# Patient Record
Sex: FEMALE | Race: BLACK OR AFRICAN AMERICAN | Employment: OTHER | URBAN - METROPOLITAN AREA
[De-identification: names, ages, dates, MRNs, and addresses within clinical notes are randomized per-mention and may not be internally consistent; named-entity substitution may affect disease eponyms.]

---

## 2017-01-31 ENCOUNTER — OFFICE VISIT (OUTPATIENT)
Dept: FAMILY MEDICINE CLINIC | Age: 62
End: 2017-01-31

## 2017-01-31 VITALS
WEIGHT: 263.2 LBS | HEIGHT: 64 IN | HEART RATE: 84 BPM | DIASTOLIC BLOOD PRESSURE: 74 MMHG | BODY MASS INDEX: 44.94 KG/M2 | SYSTOLIC BLOOD PRESSURE: 128 MMHG | RESPIRATION RATE: 18 BRPM

## 2017-01-31 DIAGNOSIS — I10 ESSENTIAL HYPERTENSION: Primary | ICD-10-CM

## 2017-01-31 DIAGNOSIS — K76.0 FATTY LIVER DISEASE, NONALCOHOLIC: ICD-10-CM

## 2017-01-31 PROCEDURE — 99213 OFFICE O/P EST LOW 20 MIN: CPT | Performed by: INTERNAL MEDICINE

## 2017-01-31 ASSESSMENT — ENCOUNTER SYMPTOMS
ALLERGIC/IMMUNOLOGIC NEGATIVE: 1
RESPIRATORY NEGATIVE: 1

## 2017-05-08 ENCOUNTER — OFFICE VISIT (OUTPATIENT)
Dept: FAMILY MEDICINE CLINIC | Age: 62
End: 2017-05-08

## 2017-05-08 VITALS
BODY MASS INDEX: 45.75 KG/M2 | DIASTOLIC BLOOD PRESSURE: 64 MMHG | HEIGHT: 63 IN | WEIGHT: 258.2 LBS | HEART RATE: 96 BPM | SYSTOLIC BLOOD PRESSURE: 108 MMHG | RESPIRATION RATE: 20 BRPM

## 2017-05-08 DIAGNOSIS — I10 ESSENTIAL HYPERTENSION: Primary | ICD-10-CM

## 2017-05-08 DIAGNOSIS — E66.01 OBESITY, CLASS III, BMI 40-49.9 (MORBID OBESITY) (HCC): ICD-10-CM

## 2017-05-08 DIAGNOSIS — K76.0 FATTY LIVER DISEASE, NONALCOHOLIC: ICD-10-CM

## 2017-05-08 PROCEDURE — 99214 OFFICE O/P EST MOD 30 MIN: CPT | Performed by: INTERNAL MEDICINE

## 2017-05-08 ASSESSMENT — ENCOUNTER SYMPTOMS
APNEA: 0
COUGH: 1
CHEST TIGHTNESS: 0
WHEEZING: 0
CHOKING: 0
STRIDOR: 0
ALLERGIC/IMMUNOLOGIC NEGATIVE: 1
SHORTNESS OF BREATH: 0

## 2017-05-08 ASSESSMENT — PATIENT HEALTH QUESTIONNAIRE - PHQ9
SUM OF ALL RESPONSES TO PHQ QUESTIONS 1-9: 0
1. LITTLE INTEREST OR PLEASURE IN DOING THINGS: 0
2. FEELING DOWN, DEPRESSED OR HOPELESS: 0
SUM OF ALL RESPONSES TO PHQ9 QUESTIONS 1 & 2: 0

## 2017-05-09 RX ORDER — BENZONATATE 200 MG/1
200 CAPSULE ORAL 3 TIMES DAILY PRN
Qty: 30 CAPSULE | Refills: 0 | Status: SHIPPED | OUTPATIENT
Start: 2017-05-09 | End: 2018-01-26 | Stop reason: ALTCHOICE

## 2017-08-18 ENCOUNTER — TELEPHONE (OUTPATIENT)
Dept: FAMILY MEDICINE CLINIC | Age: 62
End: 2017-08-18

## 2018-01-26 ENCOUNTER — OFFICE VISIT (OUTPATIENT)
Dept: FAMILY MEDICINE CLINIC | Age: 63
End: 2018-01-26

## 2018-01-26 VITALS
RESPIRATION RATE: 20 BRPM | DIASTOLIC BLOOD PRESSURE: 92 MMHG | BODY MASS INDEX: 51.91 KG/M2 | WEIGHT: 293 LBS | SYSTOLIC BLOOD PRESSURE: 164 MMHG | HEIGHT: 63 IN | HEART RATE: 91 BPM | OXYGEN SATURATION: 96 %

## 2018-01-26 DIAGNOSIS — G89.29 CHRONIC PAIN OF LEFT KNEE: ICD-10-CM

## 2018-01-26 DIAGNOSIS — R53.83 FATIGUE, UNSPECIFIED TYPE: ICD-10-CM

## 2018-01-26 DIAGNOSIS — I10 ESSENTIAL HYPERTENSION: Primary | ICD-10-CM

## 2018-01-26 DIAGNOSIS — K76.0 FATTY LIVER DISEASE, NONALCOHOLIC: ICD-10-CM

## 2018-01-26 DIAGNOSIS — M25.562 CHRONIC PAIN OF LEFT KNEE: ICD-10-CM

## 2018-01-26 DIAGNOSIS — E78.2 MIXED HYPERLIPIDEMIA: ICD-10-CM

## 2018-01-26 LAB
ALBUMIN SERPL-MCNC: 3.8 G/DL (ref 3.4–5)
ALP BLD-CCNC: 81 U/L (ref 40–129)
ALT SERPL-CCNC: 35 U/L (ref 10–40)
ANION GAP SERPL CALCULATED.3IONS-SCNC: 14 MMOL/L (ref 3–16)
AST SERPL-CCNC: 51 U/L (ref 15–37)
BASOPHILS ABSOLUTE: 0 K/UL (ref 0–0.2)
BASOPHILS RELATIVE PERCENT: 0.4 %
BILIRUB SERPL-MCNC: 0.5 MG/DL (ref 0–1)
BILIRUBIN DIRECT: <0.2 MG/DL (ref 0–0.3)
BILIRUBIN, INDIRECT: ABNORMAL MG/DL (ref 0–1)
BUN BLDV-MCNC: 13 MG/DL (ref 7–20)
CALCIUM SERPL-MCNC: 9.1 MG/DL (ref 8.3–10.6)
CHLORIDE BLD-SCNC: 103 MMOL/L (ref 99–110)
CHOLESTEROL, TOTAL: 133 MG/DL (ref 0–199)
CO2: 29 MMOL/L (ref 21–32)
CREAT SERPL-MCNC: 0.7 MG/DL (ref 0.6–1.2)
EOSINOPHILS ABSOLUTE: 0.2 K/UL (ref 0–0.6)
EOSINOPHILS RELATIVE PERCENT: 2.8 %
GFR AFRICAN AMERICAN: >60
GFR NON-AFRICAN AMERICAN: >60
GLUCOSE BLD-MCNC: 108 MG/DL (ref 70–99)
HCT VFR BLD CALC: 42.2 % (ref 36–48)
HDLC SERPL-MCNC: 43 MG/DL (ref 40–60)
HEMOGLOBIN: 13.9 G/DL (ref 12–16)
LDL CHOLESTEROL CALCULATED: 74 MG/DL
LYMPHOCYTES ABSOLUTE: 1.3 K/UL (ref 1–5.1)
LYMPHOCYTES RELATIVE PERCENT: 17.3 %
MCH RBC QN AUTO: 31.9 PG (ref 26–34)
MCHC RBC AUTO-ENTMCNC: 32.9 G/DL (ref 31–36)
MCV RBC AUTO: 97 FL (ref 80–100)
MONOCYTES ABSOLUTE: 0.6 K/UL (ref 0–1.3)
MONOCYTES RELATIVE PERCENT: 7.6 %
NEUTROPHILS ABSOLUTE: 5.3 K/UL (ref 1.7–7.7)
NEUTROPHILS RELATIVE PERCENT: 71.9 %
PDW BLD-RTO: 14.7 % (ref 12.4–15.4)
PLATELET # BLD: 222 K/UL (ref 135–450)
PMV BLD AUTO: 8.1 FL (ref 5–10.5)
POTASSIUM SERPL-SCNC: 4 MMOL/L (ref 3.5–5.1)
RBC # BLD: 4.35 M/UL (ref 4–5.2)
SODIUM BLD-SCNC: 146 MMOL/L (ref 136–145)
T4 FREE: 1 NG/DL (ref 0.9–1.8)
TOTAL PROTEIN: 6.8 G/DL (ref 6.4–8.2)
TRIGL SERPL-MCNC: 81 MG/DL (ref 0–150)
TSH SERPL DL<=0.05 MIU/L-ACNC: 2.03 UIU/ML (ref 0.27–4.2)
VLDLC SERPL CALC-MCNC: 16 MG/DL
WBC # BLD: 7.4 K/UL (ref 4–11)

## 2018-01-26 PROCEDURE — 99214 OFFICE O/P EST MOD 30 MIN: CPT | Performed by: INTERNAL MEDICINE

## 2018-01-26 PROCEDURE — G8484 FLU IMMUNIZE NO ADMIN: HCPCS | Performed by: INTERNAL MEDICINE

## 2018-01-26 PROCEDURE — 36415 COLL VENOUS BLD VENIPUNCTURE: CPT | Performed by: INTERNAL MEDICINE

## 2018-01-26 PROCEDURE — G8417 CALC BMI ABV UP PARAM F/U: HCPCS | Performed by: INTERNAL MEDICINE

## 2018-01-26 PROCEDURE — G8427 DOCREV CUR MEDS BY ELIG CLIN: HCPCS | Performed by: INTERNAL MEDICINE

## 2018-01-26 PROCEDURE — 3014F SCREEN MAMMO DOC REV: CPT | Performed by: INTERNAL MEDICINE

## 2018-01-26 PROCEDURE — 3017F COLORECTAL CA SCREEN DOC REV: CPT | Performed by: INTERNAL MEDICINE

## 2018-01-26 PROCEDURE — 1036F TOBACCO NON-USER: CPT | Performed by: INTERNAL MEDICINE

## 2018-01-26 RX ORDER — LOSARTAN POTASSIUM AND HYDROCHLOROTHIAZIDE 12.5; 5 MG/1; MG/1
1 TABLET ORAL DAILY
Qty: 30 TABLET | Refills: 5 | Status: SHIPPED | OUTPATIENT
Start: 2018-01-26 | End: 2018-07-26 | Stop reason: SDUPTHER

## 2018-01-26 RX ORDER — OMEPRAZOLE 40 MG/1
40 CAPSULE, DELAYED RELEASE ORAL DAILY
Qty: 30 CAPSULE | Refills: 3 | Status: SHIPPED | OUTPATIENT
Start: 2018-01-26 | End: 2018-05-25 | Stop reason: SDUPTHER

## 2018-01-26 ASSESSMENT — ENCOUNTER SYMPTOMS
BACK PAIN: 0
RESPIRATORY NEGATIVE: 1
ALLERGIC/IMMUNOLOGIC NEGATIVE: 1

## 2018-01-26 NOTE — PROGRESS NOTES
Subjective:      Patient ID: Ling Garcia is a 58 y.o. female. HPI  Class 3 obesity due to excess calories with serious comorbidity and body mass index (BMI) of 50.0 to 59.9 in adult [facility])  Has gained 50 lbs. No gx. Diet fair. Now w/ chronic knee pain requiring cane, BP not controlled, Pre Diabetes, Depression, and severe fatigue. Chronic pain of left knee  Gained 50 lbs and pain is getting worse. Essential hypertension  No change in meds, no c/o with meds, no chest pain, SOB, palpatations, or syncope. Home bp was not taken. Gained 50 lbs over last yr. Fatty liver disease, nonalcoholic  Wt way up. Diet fair. No c/o. History reviewed. No pertinent past medical history. Current Outpatient Prescriptions   Medication Sig Dispense Refill    omeprazole (PRILOSEC) 40 MG delayed release capsule Take 1 capsule by mouth daily 30 capsule 3    vitamin E 400 UNIT capsule Take 400 Units by mouth daily      Multiple Vitamins-Minerals (THERAPEUTIC MULTIVITAMIN-MINERALS) tablet Take 1 tablet by mouth daily      hydrochlorothiazide (MICROZIDE) 12.5 MG capsule Take 1 capsule by mouth daily 30 capsule 5    Compression Stockings MISC by Does not apply route 3 pair. Put on in am and off at bed time. 3 each 0     No current facility-administered medications for this visit. No Known Allergies  Social History   Substance Use Topics    Smoking status: Never Smoker    Smokeless tobacco: Never Used    Alcohol use 0.0 oz/week      Comment: occasional     Family History   Problem Relation Age of Onset    High Blood Pressure Mother     Anxiety Disorder Mother        Review of Systems   Constitutional: Positive for activity change, fatigue and unexpected weight change. Negative for appetite change, chills, diaphoresis and fever. Respiratory: Negative. Cardiovascular: Negative. Gastrointestinal:        GERD   Endocrine: Negative. Genitourinary: Negative.     Musculoskeletal: Positive for arthralgias, gait

## 2018-01-26 NOTE — ASSESSMENT & PLAN NOTE
No change in meds, no c/o with meds, no chest pain, SOB, palpatations, or syncope. Home bp was not taken. Gained 50 lbs over last yr.

## 2018-02-23 ENCOUNTER — OFFICE VISIT (OUTPATIENT)
Dept: FAMILY MEDICINE CLINIC | Age: 63
End: 2018-02-23

## 2018-02-23 VITALS
BODY MASS INDEX: 51.56 KG/M2 | OXYGEN SATURATION: 98 % | RESPIRATION RATE: 18 BRPM | WEIGHT: 291 LBS | SYSTOLIC BLOOD PRESSURE: 132 MMHG | DIASTOLIC BLOOD PRESSURE: 68 MMHG | HEIGHT: 63 IN | HEART RATE: 98 BPM

## 2018-02-23 DIAGNOSIS — M25.562 CHRONIC PAIN OF LEFT KNEE: ICD-10-CM

## 2018-02-23 DIAGNOSIS — I10 ESSENTIAL HYPERTENSION: ICD-10-CM

## 2018-02-23 DIAGNOSIS — G89.29 CHRONIC PAIN OF LEFT KNEE: ICD-10-CM

## 2018-02-23 LAB
ANION GAP SERPL CALCULATED.3IONS-SCNC: 13 MMOL/L (ref 3–16)
BUN BLDV-MCNC: 18 MG/DL (ref 7–20)
CALCIUM SERPL-MCNC: 9.3 MG/DL (ref 8.3–10.6)
CHLORIDE BLD-SCNC: 103 MMOL/L (ref 99–110)
CO2: 27 MMOL/L (ref 21–32)
CREAT SERPL-MCNC: 0.7 MG/DL (ref 0.6–1.2)
GFR AFRICAN AMERICAN: >60
GFR NON-AFRICAN AMERICAN: >60
GLUCOSE BLD-MCNC: 119 MG/DL (ref 70–99)
POTASSIUM SERPL-SCNC: 4.1 MMOL/L (ref 3.5–5.1)
SODIUM BLD-SCNC: 143 MMOL/L (ref 136–145)

## 2018-02-23 PROCEDURE — G8484 FLU IMMUNIZE NO ADMIN: HCPCS | Performed by: INTERNAL MEDICINE

## 2018-02-23 PROCEDURE — G8417 CALC BMI ABV UP PARAM F/U: HCPCS | Performed by: INTERNAL MEDICINE

## 2018-02-23 PROCEDURE — 99213 OFFICE O/P EST LOW 20 MIN: CPT | Performed by: INTERNAL MEDICINE

## 2018-02-23 PROCEDURE — 3017F COLORECTAL CA SCREEN DOC REV: CPT | Performed by: INTERNAL MEDICINE

## 2018-02-23 PROCEDURE — 1036F TOBACCO NON-USER: CPT | Performed by: INTERNAL MEDICINE

## 2018-02-23 PROCEDURE — G8427 DOCREV CUR MEDS BY ELIG CLIN: HCPCS | Performed by: INTERNAL MEDICINE

## 2018-02-23 PROCEDURE — 3014F SCREEN MAMMO DOC REV: CPT | Performed by: INTERNAL MEDICINE

## 2018-02-23 PROCEDURE — 36415 COLL VENOUS BLD VENIPUNCTURE: CPT | Performed by: INTERNAL MEDICINE

## 2018-02-23 ASSESSMENT — ENCOUNTER SYMPTOMS
SHORTNESS OF BREATH: 1
ALLERGIC/IMMUNOLOGIC NEGATIVE: 1
GASTROINTESTINAL NEGATIVE: 1

## 2018-02-23 NOTE — PROGRESS NOTES
02/23/18 1009   BP:  126/60 132/68   Pulse: 120 98    Resp: 24 18    SpO2: (!) 88% 98%    Weight:  291 lb (132 kg)    Height:  5' 3\" (1.6 m)        Objective:   Physical Exam   Constitutional: She is oriented to person, place, and time. She appears well-developed and well-nourished. Non-toxic appearance. She does not have a sickly appearance. She does not appear ill. No distress. Obese. HENT:   Head: Normocephalic and atraumatic. Eyes: Conjunctivae and EOM are normal. Pupils are equal, round, and reactive to light. Neck: Trachea normal, normal range of motion and full passive range of motion without pain. Neck supple. Normal carotid pulses, no hepatojugular reflux and no JVD present. No spinous process tenderness and no muscular tenderness present. Carotid bruit is not present. No tracheal deviation, no edema, no erythema and normal range of motion present. No thyroid mass and no thyromegaly present. Cardiovascular: Normal rate, regular rhythm, S1 normal, S2 normal, normal heart sounds, intact distal pulses and normal pulses. No extrasystoles are present. PMI is not displaced. Exam reveals no gallop, no S3, no S4, no distant heart sounds and no friction rub. No murmur heard. Pulses:       Carotid pulses are 2+ on the right side, and 2+ on the left side. Radial pulses are 2+ on the right side, and 2+ on the left side. Femoral pulses are 2+ on the right side, and 2+ on the left side. Popliteal pulses are 2+ on the right side, and 2+ on the left side. Dorsalis pedis pulses are 2+ on the right side, and 2+ on the left side. Posterior tibial pulses are 2+ on the right side, and 2+ on the left side. Pulmonary/Chest: Effort normal and breath sounds normal. No accessory muscle usage. No apnea, no tachypnea and no bradypnea. No respiratory distress. She has no decreased breath sounds. She has no wheezes. She has no rhonchi. She has no rales. She exhibits no tenderness. Abdominal: Soft. Bowel sounds are normal. She exhibits no distension and no mass. There is no tenderness. There is no rebound and no guarding. Musculoskeletal: Normal range of motion. She exhibits tenderness (severe OA knees. ). She exhibits no edema. Lymphadenopathy:     She has no cervical adenopathy. She has no axillary adenopathy. Neurological: She is alert and oriented to person, place, and time. She has normal strength and normal reflexes. She is not disoriented. She displays no atrophy and no tremor. No cranial nerve deficit or sensory deficit. She exhibits normal muscle tone. Coordination normal.   Skin: Skin is warm, dry and intact. No abrasion and no rash noted. She is not diaphoretic. No cyanosis or erythema. No pallor. Nails show no clubbing. Psychiatric: Her speech is normal and behavior is normal. Judgment and thought content normal. Her mood appears anxious. Her affect is not angry, not blunt, not labile and not inappropriate. Cognition and memory are normal. She exhibits a depressed mood. Assessment:      Problem   Essential Hypertension. Improved w/ HCTZ. Lost 9 lbs. Chronic Pain of Left Knee. Chronic. No meds. Plan:      All above problems reviewed and the found to be unchanged except for the following :     Essential Hypertension. Will do Renal. If ok will increase HCTZ to 25 mg. To see Wt loss MD in 2 weeks. Chronic Pain of Left Knee. Arthritis str Tylenol at bed time. Call if no improvement.

## 2018-02-27 ENCOUNTER — TELEPHONE (OUTPATIENT)
Dept: FAMILY MEDICINE CLINIC | Age: 63
End: 2018-02-27

## 2018-02-27 RX ORDER — HYDROCHLOROTHIAZIDE 12.5 MG/1
12.5 TABLET ORAL DAILY
Qty: 30 TABLET | Refills: 3 | Status: ON HOLD | OUTPATIENT
Start: 2018-02-27 | End: 2019-02-14 | Stop reason: HOSPADM

## 2018-03-06 ENCOUNTER — TELEPHONE (OUTPATIENT)
Dept: FAMILY MEDICINE CLINIC | Age: 63
End: 2018-03-06

## 2018-03-06 NOTE — TELEPHONE ENCOUNTER
C/o diarrhea past 4 days since starting hctz- watery stool. Denies fever. Denies abd pain, denies nausea and vomiting.    Advised  BRAT diet and hydration, will advise MD

## 2018-05-22 ENCOUNTER — TELEPHONE (OUTPATIENT)
Dept: BARIATRICS/WEIGHT MGMT | Age: 63
End: 2018-05-22

## 2018-05-25 ENCOUNTER — OFFICE VISIT (OUTPATIENT)
Dept: FAMILY MEDICINE CLINIC | Age: 63
End: 2018-05-25

## 2018-05-25 VITALS
HEART RATE: 106 BPM | WEIGHT: 285.6 LBS | SYSTOLIC BLOOD PRESSURE: 122 MMHG | BODY MASS INDEX: 50.61 KG/M2 | RESPIRATION RATE: 16 BRPM | OXYGEN SATURATION: 98 % | DIASTOLIC BLOOD PRESSURE: 74 MMHG | HEIGHT: 63 IN

## 2018-05-25 DIAGNOSIS — K76.0 FATTY LIVER DISEASE, NONALCOHOLIC: ICD-10-CM

## 2018-05-25 DIAGNOSIS — G89.29 CHRONIC PAIN OF LEFT KNEE: ICD-10-CM

## 2018-05-25 DIAGNOSIS — I10 ESSENTIAL HYPERTENSION: ICD-10-CM

## 2018-05-25 DIAGNOSIS — M25.562 CHRONIC PAIN OF LEFT KNEE: ICD-10-CM

## 2018-05-25 PROCEDURE — 1036F TOBACCO NON-USER: CPT | Performed by: INTERNAL MEDICINE

## 2018-05-25 PROCEDURE — G8427 DOCREV CUR MEDS BY ELIG CLIN: HCPCS | Performed by: INTERNAL MEDICINE

## 2018-05-25 PROCEDURE — 99214 OFFICE O/P EST MOD 30 MIN: CPT | Performed by: INTERNAL MEDICINE

## 2018-05-25 PROCEDURE — 3017F COLORECTAL CA SCREEN DOC REV: CPT | Performed by: INTERNAL MEDICINE

## 2018-05-25 PROCEDURE — G8417 CALC BMI ABV UP PARAM F/U: HCPCS | Performed by: INTERNAL MEDICINE

## 2018-05-25 RX ORDER — OMEPRAZOLE 40 MG/1
40 CAPSULE, DELAYED RELEASE ORAL DAILY
Qty: 30 CAPSULE | Refills: 5 | Status: SHIPPED | OUTPATIENT
Start: 2018-05-25 | End: 2018-06-27

## 2018-05-25 ASSESSMENT — ENCOUNTER SYMPTOMS
EYES NEGATIVE: 1
BACK PAIN: 1
GASTROINTESTINAL NEGATIVE: 1
RESPIRATORY NEGATIVE: 1
ALLERGIC/IMMUNOLOGIC NEGATIVE: 1

## 2018-05-25 ASSESSMENT — PATIENT HEALTH QUESTIONNAIRE - PHQ9
SUM OF ALL RESPONSES TO PHQ QUESTIONS 1-9: 2
SUM OF ALL RESPONSES TO PHQ9 QUESTIONS 1 & 2: 2
1. LITTLE INTEREST OR PLEASURE IN DOING THINGS: 1
2. FEELING DOWN, DEPRESSED OR HOPELESS: 1

## 2018-06-27 ENCOUNTER — OFFICE VISIT (OUTPATIENT)
Dept: BARIATRICS/WEIGHT MGMT | Age: 63
End: 2018-06-27

## 2018-06-27 VITALS
WEIGHT: 291.8 LBS | HEIGHT: 63 IN | HEART RATE: 95 BPM | BODY MASS INDEX: 51.7 KG/M2 | DIASTOLIC BLOOD PRESSURE: 67 MMHG | SYSTOLIC BLOOD PRESSURE: 115 MMHG

## 2018-06-27 DIAGNOSIS — K76.0 FATTY LIVER DISEASE, NONALCOHOLIC: ICD-10-CM

## 2018-06-27 DIAGNOSIS — E66.01 MORBID OBESITY WITH BMI OF 50.0-59.9, ADULT (HCC): Primary | ICD-10-CM

## 2018-06-27 DIAGNOSIS — I10 ESSENTIAL HYPERTENSION: ICD-10-CM

## 2018-06-27 PROCEDURE — G8427 DOCREV CUR MEDS BY ELIG CLIN: HCPCS | Performed by: SURGERY

## 2018-06-27 PROCEDURE — G8417 CALC BMI ABV UP PARAM F/U: HCPCS | Performed by: SURGERY

## 2018-06-27 PROCEDURE — 99205 OFFICE O/P NEW HI 60 MIN: CPT | Performed by: SURGERY

## 2018-06-27 PROCEDURE — 3017F COLORECTAL CA SCREEN DOC REV: CPT | Performed by: SURGERY

## 2018-06-27 PROCEDURE — 1036F TOBACCO NON-USER: CPT | Performed by: SURGERY

## 2018-06-28 ENCOUNTER — TELEPHONE (OUTPATIENT)
Dept: FAMILY MEDICINE CLINIC | Age: 63
End: 2018-06-28

## 2018-07-23 ENCOUNTER — OFFICE VISIT (OUTPATIENT)
Dept: BARIATRICS/WEIGHT MGMT | Age: 63
End: 2018-07-23

## 2018-07-23 VITALS
DIASTOLIC BLOOD PRESSURE: 61 MMHG | WEIGHT: 280 LBS | BODY MASS INDEX: 49.61 KG/M2 | HEART RATE: 89 BPM | SYSTOLIC BLOOD PRESSURE: 102 MMHG | HEIGHT: 63 IN

## 2018-07-23 DIAGNOSIS — E66.01 MORBID OBESITY WITH BMI OF 45.0-49.9, ADULT (HCC): Primary | ICD-10-CM

## 2018-07-23 DIAGNOSIS — I10 ESSENTIAL HYPERTENSION: ICD-10-CM

## 2018-07-23 DIAGNOSIS — K76.0 FATTY LIVER DISEASE, NONALCOHOLIC: ICD-10-CM

## 2018-07-23 PROCEDURE — G8427 DOCREV CUR MEDS BY ELIG CLIN: HCPCS | Performed by: SURGERY

## 2018-07-23 PROCEDURE — 1036F TOBACCO NON-USER: CPT | Performed by: SURGERY

## 2018-07-23 PROCEDURE — G8417 CALC BMI ABV UP PARAM F/U: HCPCS | Performed by: SURGERY

## 2018-07-23 PROCEDURE — 99213 OFFICE O/P EST LOW 20 MIN: CPT | Performed by: SURGERY

## 2018-07-23 PROCEDURE — 3017F COLORECTAL CA SCREEN DOC REV: CPT | Performed by: SURGERY

## 2018-07-23 RX ORDER — OMEPRAZOLE 40 MG/1
40 CAPSULE, DELAYED RELEASE ORAL DAILY
COMMUNITY
End: 2018-11-26 | Stop reason: SDUPTHER

## 2018-07-23 NOTE — PROGRESS NOTES
negative  Neurological ROS: negative  Endocrine ROS: negative  Respiratory ROS: negative  Cardiovascular ROS: negative  Gastrointestinal ROS:negative  Genito-Urinary ROS: negative  Musculoskeletal ROS: negative   Skin ROS: negative    Physical Exam   Vitals Reviewed   Constitutional: Patient is oriented to person, place, and time. Patient appears well-developed and well-nourished. Patient is active and cooperative. Non-toxic appearance. No distress. Neck: Trachea normal and normal range of motion. No JVD present. Pulmonary/Chest: Effort normal. No accessory muscle usage or stridor. No apnea. No respiratory distress. Cardiovascular: Normal rate and no JVD. Abdominal: Normal appearance. Patient exhibits no distension. Abdomen is soft, obese, non tender. Musculoskeletal: Normal range of motion. Patient exhibits no edema. Neurological: Patient is alert and oriented to person, place, and time. Patient has normal strength. GCS eye subscore is 4. GCS verbal subscore is 5. GCS motor subscore is 6. Skin: Skin is warm and dry. No abrasion and no rash noted. Patient is not diaphoretic. No cyanosis or erythema. Psychiatric: Patient has a normal mood and affect. Speech is normal and behavior is normal. Cognition and memory are normal.       A/P    [place] Billing is 58 y.o. female, Body mass index is 49.6 kg/m². pre surgery, has lost 11# since last visit. The patient underwent dietary counseling with registered dietician. I have reviewed, discussed and agree with the dietary plan. Patient is trying hard to keep good dietary and behavior modifications. Patient is monitoring portion sizes, food choices and liquid calories. Patient is trying to exercise regularly as much as possible.   We discussed how her weight affects her overall health including:  Patient Active Problem List   Diagnosis    Chronic pain of left knee    Left ankle swelling    Class 3 obesity due to excess calories with serious comorbidity and

## 2018-07-23 NOTE — PROGRESS NOTES
Grace Meehan is a 58 y.o. female with a date of birth of 1955. Vitals:    06/27/18 1517   BP: 115/67   Pulse: 95    BMI: Body mass index is 51.69 kg/m². Obesity Classification: Class III    Weight History: Wt Readings from Last 3 Encounters:   06/27/18 291 lb 12.8 oz (132.4 kg)   05/25/18 285 lb 9.6 oz (129.5 kg)   02/23/18 291 lb (132 kg)       Patient's lowest adult weight was 185 lbs at age 47. Patient's highest adult weight was 300 lbs at age 58. Patient has participated in the following weight loss programs: Weight Watcher Anonymous, calorie restriction, and low fat. Patient has participated in meal replacement/liquid diets. Patient has participated in weight loss medications - over the counter. Patient is not lactose intolerant. Patient does not have Orthodoxy/cultural food concerns. Patient does not have food allergies. Patient is able to tolerate artificial sweeteners. Patient dines out to a sit down restaurant 6 times per year. Patient dines out to a fast food restaurant 3 times per week. Patient has regular sleep/wake schedule. 24 hour recall/food frequency chart:  Breakfast: yes. Scrambled egg, sausage, toast, may have a banana, water  Snack: no.   Lunch: no.   Snack: yes. May snack on cheetos  Dinner: yes. Fried chix, salad, mashed potatoes, green beans OR fast food burger, ff; doesn't cook much  Snack: no.   Drinks throughout the day: water - 48 to 64oz. Also uses crystal lite  Do you drink alcohol? No  Patient does not meet the criteria for binge eating disorder. Patient does not have grazing. Patient does not have night eating. Patient does not have a history of emotional eating or eating out of boredom. It does not take an unusually large amount of food for the patient to feel comfortably full. Most days the patient eats until she is full. Patient describes level of activity as sedentary - has bad knees.      Surgery  Patient's greatest concern about
ankle swelling    Class 3 obesity due to excess calories with serious comorbidity and body mass index (BMI) of 50.0 to 59.9 in adult (HCC)    Fatigue    Fatty liver disease, nonalcoholic    Essential hypertension      The patient underwent 30 minutes of dietary counseling. I have reviewed, discussed and agree with the dietary plan. Medical weight loss and different surgical options were discussed in details with patient. Raghu Patel is interested in surgical weight loss. Patient is interested in Laparoscopic Sleeve Gastrectomy, which I believe is an excellent option for the patient. We will proceed with pre-operative work up labs and studies. Will also petition patient's  insurance for approval for this procedure. Patient received dietary handouts and education. Patient advised that its their responsibility to follow up for studies and/or labs ordered today. Also discussed in details the importance of follow up, as well following the recommendations and completing the whole program to improve outcomes when it comes to healthier lifestyle as well weight loss. Patient also advised about risks and benefits being on a strict dietary regimen as well using supplements. Patient agrees and wants to proceed with weight loss planning     Labs ordered. Cardiac Clearance. Psych Evaluation. Sleep Clearance    H-pylori   EGD  Support Group. PCP Letter. Weight History 2 yrs. F/U in 4 weeks. Patient advised that its their responsibility to follow up for studies and/or labs ordered today.      Barrie Henriquez,

## 2018-07-24 ENCOUNTER — TELEPHONE (OUTPATIENT)
Dept: SURGERY | Age: 63
End: 2018-07-24

## 2018-07-26 RX ORDER — LOSARTAN POTASSIUM AND HYDROCHLOROTHIAZIDE 12.5; 5 MG/1; MG/1
1 TABLET ORAL DAILY
Qty: 30 TABLET | Refills: 5 | Status: SHIPPED | OUTPATIENT
Start: 2018-07-26 | End: 2019-01-28 | Stop reason: SDUPTHER

## 2018-08-02 ENCOUNTER — OFFICE VISIT (OUTPATIENT)
Dept: CARDIOLOGY CLINIC | Age: 63
End: 2018-08-02

## 2018-08-02 VITALS
WEIGHT: 278 LBS | HEART RATE: 95 BPM | DIASTOLIC BLOOD PRESSURE: 80 MMHG | BODY MASS INDEX: 49.25 KG/M2 | SYSTOLIC BLOOD PRESSURE: 136 MMHG

## 2018-08-02 DIAGNOSIS — Z01.810 PRE-OPERATIVE CARDIOVASCULAR EXAMINATION: ICD-10-CM

## 2018-08-02 DIAGNOSIS — Z01.810 PREOPERATIVE CARDIOVASCULAR EXAMINATION: ICD-10-CM

## 2018-08-02 DIAGNOSIS — R06.02 SOB (SHORTNESS OF BREATH): Primary | ICD-10-CM

## 2018-08-02 DIAGNOSIS — I10 ESSENTIAL HYPERTENSION: Primary | ICD-10-CM

## 2018-08-02 PROCEDURE — G8417 CALC BMI ABV UP PARAM F/U: HCPCS | Performed by: INTERNAL MEDICINE

## 2018-08-02 PROCEDURE — 3017F COLORECTAL CA SCREEN DOC REV: CPT | Performed by: INTERNAL MEDICINE

## 2018-08-02 PROCEDURE — 99203 OFFICE O/P NEW LOW 30 MIN: CPT | Performed by: INTERNAL MEDICINE

## 2018-08-02 PROCEDURE — G8427 DOCREV CUR MEDS BY ELIG CLIN: HCPCS | Performed by: INTERNAL MEDICINE

## 2018-08-02 PROCEDURE — 1036F TOBACCO NON-USER: CPT | Performed by: INTERNAL MEDICINE

## 2018-08-02 ASSESSMENT — ENCOUNTER SYMPTOMS
EYES NEGATIVE: 1
COUGH: 0
ALLERGIC/IMMUNOLOGIC NEGATIVE: 1
GASTROINTESTINAL NEGATIVE: 1
CHOKING: 0
STRIDOR: 0
SHORTNESS OF BREATH: 0
APNEA: 0
CHEST TIGHTNESS: 0

## 2018-08-02 NOTE — PROGRESS NOTES
of motion. Neck supple. Cardiovascular: Normal rate, normal heart sounds and intact distal pulses. Exam reveals no friction rub. No murmur heard. Pulmonary/Chest: Effort normal and breath sounds normal. No respiratory distress. She has no wheezes. She has no rales. Abdominal: Soft. Bowel sounds are normal. She exhibits no distension. There is no tenderness. Musculoskeletal: Normal range of motion. She exhibits no edema or deformity. Neurological: She is alert and oriented to person, place, and time. Skin: Skin is warm and dry. Psychiatric: She has a normal mood and affect. Her behavior is normal. Judgment and thought content normal.       Current Outpatient Prescriptions   Medication Sig Dispense Refill    losartan-hydrochlorothiazide (HYZAAR) 50-12.5 MG per tablet Take 1 tablet by mouth daily 30 tablet 5    omeprazole (PRILOSEC) 40 MG delayed release capsule Take 40 mg by mouth daily      hydrochlorothiazide (HYDRODIURIL) 12.5 MG tablet Take 1 tablet by mouth daily 30 tablet 3    vitamin E 400 UNIT capsule Take 400 Units by mouth daily      Compression Stockings MISC by Does not apply route 3 pair. Put on in am and off at bed time. 3 each 0    Multiple Vitamins-Minerals (THERAPEUTIC MULTIVITAMIN-MINERALS) tablet Take 1 tablet by mouth daily       No current facility-administered medications for this visit. There were no vitals filed for this visit. Assessment:       Diagnosis Orders   1. Essential hypertension          ECG with SR and PAC.  RAD. Plan:      HTN- Losartan-hydrochlorothiazide     Genny ARREOLA RN am scribing for and in the presence of Dr. Didier Coker. 08/02/18   12:18 PM  Genny Farias RN      The paitent has no chest pain and no ischemia on ECG. LDL is consistently in mid 70s. Would check echo and if WNL then patient would be at acceptable risk to proceed with gastric sleeve surgery.       I, Dr. Zonia Pandya, personal performed the services described in this

## 2018-08-03 DIAGNOSIS — K76.0 FATTY LIVER DISEASE, NONALCOHOLIC: ICD-10-CM

## 2018-08-03 DIAGNOSIS — E66.01 MORBID OBESITY WITH BMI OF 45.0-49.9, ADULT (HCC): Primary | ICD-10-CM

## 2018-08-08 ENCOUNTER — HOSPITAL ENCOUNTER (OUTPATIENT)
Dept: NON INVASIVE DIAGNOSTICS | Age: 63
Discharge: HOME | End: 2018-08-08
Payer: MEDICAID

## 2018-08-08 DIAGNOSIS — Z12.11 SCREENING FOR COLON CANCER: Primary | ICD-10-CM

## 2018-08-08 LAB
LV EF: 58 %
LVEF MODALITY: NORMAL

## 2018-08-08 PROCEDURE — 93306 TTE W/DOPPLER COMPLETE: CPT

## 2018-08-16 ENCOUNTER — TELEPHONE (OUTPATIENT)
Dept: SURGERY | Age: 63
End: 2018-08-16

## 2018-08-17 ENCOUNTER — ANESTHESIA EVENT (OUTPATIENT)
Dept: ENDOSCOPY | Age: 63
End: 2018-08-17
Payer: MEDICAID

## 2018-08-17 ENCOUNTER — HOSPITAL ENCOUNTER (OUTPATIENT)
Age: 63
Setting detail: OUTPATIENT SURGERY
Discharge: HOME | End: 2018-08-17
Attending: SURGERY | Admitting: SURGERY
Payer: MEDICAID

## 2018-08-17 ENCOUNTER — ANESTHESIA (OUTPATIENT)
Dept: ENDOSCOPY | Age: 63
End: 2018-08-17
Payer: MEDICAID

## 2018-08-17 VITALS
DIASTOLIC BLOOD PRESSURE: 62 MMHG | TEMPERATURE: 98.2 F | SYSTOLIC BLOOD PRESSURE: 106 MMHG | OXYGEN SATURATION: 100 % | HEIGHT: 63 IN | WEIGHT: 278 LBS | RESPIRATION RATE: 16 BRPM | BODY MASS INDEX: 49.26 KG/M2 | HEART RATE: 75 BPM

## 2018-08-17 VITALS — SYSTOLIC BLOOD PRESSURE: 117 MMHG | DIASTOLIC BLOOD PRESSURE: 37 MMHG | OXYGEN SATURATION: 97 %

## 2018-08-17 PROCEDURE — 3700000001 HC ADD 15 MINUTES (ANESTHESIA): Performed by: SURGERY

## 2018-08-17 PROCEDURE — 2720000010 HC SURG SUPPLY STERILE: Performed by: SURGERY

## 2018-08-17 PROCEDURE — 3609013500 HC EGD REMOVAL TUMOR POLYP/OTHER LESION SNARE TECH: Performed by: SURGERY

## 2018-08-17 PROCEDURE — 2500000003 HC RX 250 WO HCPCS: Performed by: NURSE ANESTHETIST, CERTIFIED REGISTERED

## 2018-08-17 PROCEDURE — 6360000002 HC RX W HCPCS: Performed by: NURSE ANESTHETIST, CERTIFIED REGISTERED

## 2018-08-17 PROCEDURE — 3700000000 HC ANESTHESIA ATTENDED CARE: Performed by: SURGERY

## 2018-08-17 PROCEDURE — 88305 TISSUE EXAM BY PATHOLOGIST: CPT

## 2018-08-17 PROCEDURE — 7100000011 HC PHASE II RECOVERY - ADDTL 15 MIN: Performed by: SURGERY

## 2018-08-17 PROCEDURE — 43239 EGD BIOPSY SINGLE/MULTIPLE: CPT | Performed by: SURGERY

## 2018-08-17 PROCEDURE — 3609010600 HC COLONOSCOPY POLYPECTOMY SNARE/COLD BIOPSY: Performed by: SURGERY

## 2018-08-17 PROCEDURE — 3609012400 HC EGD TRANSORAL BIOPSY SINGLE/MULTIPLE: Performed by: SURGERY

## 2018-08-17 PROCEDURE — 7100000010 HC PHASE II RECOVERY - FIRST 15 MIN: Performed by: SURGERY

## 2018-08-17 PROCEDURE — 45385 COLONOSCOPY W/LESION REMOVAL: CPT | Performed by: SURGERY

## 2018-08-17 PROCEDURE — 2580000003 HC RX 258: Performed by: NURSE ANESTHETIST, CERTIFIED REGISTERED

## 2018-08-17 PROCEDURE — 43251 EGD REMOVE LESION SNARE: CPT | Performed by: SURGERY

## 2018-08-17 RX ORDER — LIDOCAINE HYDROCHLORIDE 20 MG/ML
INJECTION, SOLUTION EPIDURAL; INFILTRATION; INTRACAUDAL; PERINEURAL PRN
Status: DISCONTINUED | OUTPATIENT
Start: 2018-08-17 | End: 2018-08-17 | Stop reason: SDUPTHER

## 2018-08-17 RX ORDER — PROPOFOL 10 MG/ML
INJECTION, EMULSION INTRAVENOUS CONTINUOUS PRN
Status: DISCONTINUED | OUTPATIENT
Start: 2018-08-17 | End: 2018-08-17 | Stop reason: SDUPTHER

## 2018-08-17 RX ORDER — PROPOFOL 10 MG/ML
INJECTION, EMULSION INTRAVENOUS PRN
Status: DISCONTINUED | OUTPATIENT
Start: 2018-08-17 | End: 2018-08-17 | Stop reason: SDUPTHER

## 2018-08-17 RX ORDER — SODIUM CHLORIDE, SODIUM LACTATE, POTASSIUM CHLORIDE, CALCIUM CHLORIDE 600; 310; 30; 20 MG/100ML; MG/100ML; MG/100ML; MG/100ML
INJECTION, SOLUTION INTRAVENOUS CONTINUOUS PRN
Status: DISCONTINUED | OUTPATIENT
Start: 2018-08-17 | End: 2018-08-17 | Stop reason: SDUPTHER

## 2018-08-17 RX ADMIN — SODIUM CHLORIDE, SODIUM LACTATE, POTASSIUM CHLORIDE, AND CALCIUM CHLORIDE: 600; 310; 30; 20 INJECTION, SOLUTION INTRAVENOUS at 12:07

## 2018-08-17 RX ADMIN — PROPOFOL 50 MG: 10 INJECTION, EMULSION INTRAVENOUS at 12:13

## 2018-08-17 RX ADMIN — PROPOFOL 50 MG: 10 INJECTION, EMULSION INTRAVENOUS at 12:11

## 2018-08-17 RX ADMIN — PROPOFOL 40 MG: 10 INJECTION, EMULSION INTRAVENOUS at 12:15

## 2018-08-17 RX ADMIN — PROPOFOL 80 MCG/KG/MIN: 10 INJECTION, EMULSION INTRAVENOUS at 12:15

## 2018-08-17 RX ADMIN — LIDOCAINE HYDROCHLORIDE 100 MG: 20 INJECTION, SOLUTION EPIDURAL; INFILTRATION; INTRACAUDAL; PERINEURAL at 12:11

## 2018-08-17 ASSESSMENT — PULMONARY FUNCTION TESTS
PIF_VALUE: 0

## 2018-08-17 ASSESSMENT — PAIN SCALES - GENERAL: PAINLEVEL_OUTOF10: 0

## 2018-08-17 ASSESSMENT — PAIN DESCRIPTION - DESCRIPTORS: DESCRIPTORS: ACHING

## 2018-08-17 ASSESSMENT — PAIN - FUNCTIONAL ASSESSMENT: PAIN_FUNCTIONAL_ASSESSMENT: 0-10

## 2018-08-17 NOTE — ANESTHESIA PRE PROCEDURE
Department of Anesthesiology  Preprocedure Note       Name:  Maty Dixon   Age:  58 y.o.  :  1955                                          MRN:  [id number]         Date:  2018      Surgeon: Jose Church): Fantasma Vigil MD    Procedure: Procedure(s):  ESOPHAGOGASTRODUODENOSCOPY WITH MAC ANESTHESIA  COLONOSCOPY WITH MAC ANESTHESIA    Medications prior to admission:   Prior to Admission medications    Medication Sig Start Date End Date Taking? Authorizing Provider   polyethylene glycol (GOLYTELY) 236 g solution Please follow prep instructions provided by doctor's office. 18   Lyla Rios MD   losartan-hydrochlorothiazide [facility]) 50-12.5 MG per tablet Take 1 tablet by mouth daily 18   C Lottie Ortiz MD   omeprazole (PRILOSEC) 40 MG delayed release capsule Take 40 mg by mouth daily    Historical Provider, MD   hydrochlorothiazide (HYDRODIURIL) 12.5 MG tablet Take 1 tablet by mouth daily 18   C Lottie Ortiz MD   vitamin E 400 UNIT capsule Take 400 Units by mouth daily    Historical Provider, MD   Compression Stockings MISC by Does not apply route 3 pair. Put on in am and off at bed time. 16   C Lottie Ortiz MD   Multiple Vitamins-Minerals (THERAPEUTIC MULTIVITAMIN-MINERALS) tablet Take 1 tablet by mouth daily    Historical Provider, MD       Current medications:    No current facility-administered medications for this encounter.         Allergies:  No Known Allergies    Problem List:    Patient Active Problem List   Diagnosis Code    Chronic pain of left knee M25.562, G89.29    Left ankle swelling M25.472    Class 3 obesity due to excess calories with serious comorbidity and body mass index (BMI) of 50.0 to 59.9 in adult ([facility] [address] E66.09, Z68.43    Fatigue R53.83    Fatty liver disease, nonalcoholic B10.0    Essential hypertension I10       Past Medical History:        Diagnosis Date    Arthritis     Back pain     Obesity     Sleep apnea        Past Surgical Pulmonary:   (+) sleep apnea:                             Cardiovascular:    (+) hypertension:,                   Neuro/Psych:   Negative Neuro/Psych ROS              GI/Hepatic/Renal:   (+) liver disease:,           Endo/Other: Negative Endo/Other ROS                    Abdominal:   (+) obese,         Vascular: negative vascular ROS. Anesthesia Plan      MAC     ASA 3             Anesthetic plan and risks discussed with patient. Plan discussed with CRNA.     Attending anesthesiologist reviewed and agrees with Alexa Reinoso MD   8/17/2018

## 2018-08-17 NOTE — OP NOTE
include:  Upper: normal Esophageal Mucosa with single Varix  Lower:normal Esophageal Mucosa    Gastric findings include: normal Gastric Mucosa    Duodenal Findings: normal Duodenal Mucosa      Estimated Blood Loss:  none    Biopsy:  Antrum, Gastric Polyp    Complications:  None; patient tolerated the procedure well. Disposition: PACU - hemodynamically stable. Condition: stable    Attending Attestation: I was present and scrubbed for the entire procedure.

## 2018-08-17 NOTE — H&P
Subjective:     Patient is a 58 y.o. woman here for colonoscopy and EGD    HPI: Here for upper and lower endoscopy as part of bariatric process    Patient Active Problem List    Diagnosis Date Noted    Essential hypertension 12/16/2016    Fatty liver disease, nonalcoholic 49/78/9351    Chronic pain of left knee 06/29/2015    Left ankle swelling 06/29/2015    Class 3 obesity due to excess calories with serious comorbidity and body mass index (BMI) of 50.0 to 59.9 in adult [facility]) 06/29/2015    Fatigue 06/29/2015     Past Medical History:   Diagnosis Date    Arthritis     Back pain     Hypertension     Obesity     Sleep apnea       Past Surgical History:   Procedure Laterality Date    TONSILLECTOMY      TONSILLECTOMY        Prescriptions Prior to Admission: losartan-hydrochlorothiazide (HYZAAR) 50-12.5 MG per tablet, Take 1 tablet by mouth daily  omeprazole (PRILOSEC) 40 MG delayed release capsule, Take 40 mg by mouth daily  hydrochlorothiazide (HYDRODIURIL) 12.5 MG tablet, Take 1 tablet by mouth daily  vitamin E 400 UNIT capsule, Take 400 Units by mouth daily  polyethylene glycol (GOLYTELY) 236 g solution, Please follow prep instructions provided by doctor's office. Compression Stockings MISC, by Does not apply route 3 pair. Put on in am and off at bed time.   Multiple Vitamins-Minerals (THERAPEUTIC MULTIVITAMIN-MINERALS) tablet, Take 1 tablet by mouth daily  No Known Allergies   Social History   Substance Use Topics    Smoking status: Never Smoker    Smokeless tobacco: Never Used    Alcohol use No      Family History   Problem Relation Age of Onset    High Blood Pressure Mother     Anxiety Disorder Mother     Arthritis Mother       Review of Systems    GEN: reviewed and negative except as noted in HPI  GI: reviewed and negative except as noted in HPI      Objective:     Patient Vitals for the past 8 hrs:   BP Temp Temp src Pulse Resp SpO2 Height Weight   08/17/18 1149 (!) 113/58 98.2 °F (36.8 °C)
Oral 98 16 96 % 5' 3\" (1.6 m) 278 lb (126.1 kg)     GEN: appears well, no distress, appears stated age  PSYCH: normal mood, normal affect  NECK: no neck masses, trachea midline  ENT: moist oral mucosa; anicteric  SKIN: no rash or jaundice  CV: regular heart rate and rhythm  PULM: normal respiratory effort, no wheezing  GI: soft non tender abdomen. Normal bowel sounds  RECTAL: deferred   EXT/NEURO: normal gait, strength/sensation grossly intact in all extremities      Assessment:     Colon cancer screen    Plan:     RBA discussed. Risks of bleeding and perforation. May need additional operation/treatment based on findings.  Patient understands and wishes to proceed    Deepthi Kirkpatrick M.D.  8/17/18   12:28 PM      \

## 2018-08-20 ENCOUNTER — TELEPHONE (OUTPATIENT)
Dept: SURGERY | Age: 63
End: 2018-08-20

## 2018-08-20 NOTE — TELEPHONE ENCOUNTER
Called and left message to call office re: colonoscopy    Pathology showed 1cm tubulovillous adenoma. This is benign but could have turned cancerous eventually. I recommend 3 year repeat scope to ensure no other polyps developing.     Thanks    CHRISTIANO

## 2018-08-21 ENCOUNTER — TELEPHONE (OUTPATIENT)
Dept: CARDIOLOGY CLINIC | Age: 63
End: 2018-08-21

## 2018-08-21 DIAGNOSIS — E66.01 MORBID OBESITY WITH BMI OF 50.0-59.9, ADULT (HCC): ICD-10-CM

## 2018-08-21 LAB
A/G RATIO: 1.3 (ref 1.1–2.2)
ALBUMIN SERPL-MCNC: 4.1 G/DL (ref 3.4–5)
ALP BLD-CCNC: 91 U/L (ref 40–129)
ALT SERPL-CCNC: 28 U/L (ref 10–40)
ANION GAP SERPL CALCULATED.3IONS-SCNC: 16 MMOL/L (ref 3–16)
AST SERPL-CCNC: 37 U/L (ref 15–37)
BASOPHILS ABSOLUTE: 0 K/UL (ref 0–0.2)
BASOPHILS RELATIVE PERCENT: 0.2 %
BILIRUB SERPL-MCNC: 0.5 MG/DL (ref 0–1)
BUN BLDV-MCNC: 14 MG/DL (ref 7–20)
CALCIUM SERPL-MCNC: 9.7 MG/DL (ref 8.3–10.6)
CHLORIDE BLD-SCNC: 102 MMOL/L (ref 99–110)
CHOLESTEROL, TOTAL: 135 MG/DL (ref 0–199)
CO2: 26 MMOL/L (ref 21–32)
CREAT SERPL-MCNC: 0.8 MG/DL (ref 0.6–1.2)
EOSINOPHILS ABSOLUTE: 0.3 K/UL (ref 0–0.6)
EOSINOPHILS RELATIVE PERCENT: 3.2 %
FOLATE: 16.48 NG/ML (ref 4.78–24.2)
GFR AFRICAN AMERICAN: >60
GFR NON-AFRICAN AMERICAN: >60
GLOBULIN: 3.2 G/DL
GLUCOSE BLD-MCNC: 88 MG/DL (ref 70–99)
HCT VFR BLD CALC: 41.3 % (ref 36–48)
HDLC SERPL-MCNC: 37 MG/DL (ref 40–60)
HEMOGLOBIN: 13.9 G/DL (ref 12–16)
IRON SATURATION: 24 % (ref 15–50)
IRON: 66 UG/DL (ref 37–145)
LDL CHOLESTEROL CALCULATED: 80 MG/DL
LYMPHOCYTES ABSOLUTE: 1.5 K/UL (ref 1–5.1)
LYMPHOCYTES RELATIVE PERCENT: 18.9 %
MCH RBC QN AUTO: 31.2 PG (ref 26–34)
MCHC RBC AUTO-ENTMCNC: 33.6 G/DL (ref 31–36)
MCV RBC AUTO: 92.8 FL (ref 80–100)
MONOCYTES ABSOLUTE: 0.6 K/UL (ref 0–1.3)
MONOCYTES RELATIVE PERCENT: 7.1 %
NEUTROPHILS ABSOLUTE: 5.6 K/UL (ref 1.7–7.7)
NEUTROPHILS RELATIVE PERCENT: 70.6 %
PDW BLD-RTO: 13.4 % (ref 12.4–15.4)
PLATELET # BLD: 268 K/UL (ref 135–450)
PMV BLD AUTO: 7.8 FL (ref 5–10.5)
POTASSIUM SERPL-SCNC: 4 MMOL/L (ref 3.5–5.1)
RBC # BLD: 4.45 M/UL (ref 4–5.2)
SODIUM BLD-SCNC: 144 MMOL/L (ref 136–145)
TOTAL IRON BINDING CAPACITY: 280 UG/DL (ref 260–445)
TOTAL PROTEIN: 7.3 G/DL (ref 6.4–8.2)
TRIGL SERPL-MCNC: 90 MG/DL (ref 0–150)
TSH REFLEX: 2.52 UIU/ML (ref 0.27–4.2)
VITAMIN B-12: 407 PG/ML (ref 211–911)
VITAMIN D 25-HYDROXY: 23.5 NG/ML
VLDLC SERPL CALC-MCNC: 18 MG/DL
WBC # BLD: 8 K/UL (ref 4–11)

## 2018-08-21 NOTE — LETTER
[address] [address]  Phone: [phone]  Fax: [phone]    Lucero Boss MD        August 21, 2018 8/21/2018      Myranda Rodríguez YOB: 1955 is cleared from a cardiac standpoint and is considered to be an acceptable risk for their planned surgical procedure. If there are any questions, please feel free to contact my office at [phone].       Sincerely,                        Lucero Boss MD

## 2018-08-22 LAB
ESTIMATED AVERAGE GLUCOSE: 119.8 MG/DL
HBA1C MFR BLD: 5.8 %

## 2018-08-24 ENCOUNTER — TELEPHONE (OUTPATIENT)
Dept: FAMILY MEDICINE CLINIC | Age: 63
End: 2018-08-24

## 2018-08-24 ENCOUNTER — OFFICE VISIT (OUTPATIENT)
Dept: BARIATRICS/WEIGHT MGMT | Age: 63
End: 2018-08-24

## 2018-08-24 VITALS
HEART RATE: 90 BPM | DIASTOLIC BLOOD PRESSURE: 60 MMHG | HEIGHT: 63 IN | SYSTOLIC BLOOD PRESSURE: 109 MMHG | BODY MASS INDEX: 48.16 KG/M2 | WEIGHT: 271.8 LBS

## 2018-08-24 DIAGNOSIS — K21.9 HIATAL HERNIA WITH GERD: ICD-10-CM

## 2018-08-24 DIAGNOSIS — E66.01 MORBID OBESITY WITH BMI OF 45.0-49.9, ADULT (HCC): Primary | ICD-10-CM

## 2018-08-24 DIAGNOSIS — K76.0 FATTY LIVER DISEASE, NONALCOHOLIC: ICD-10-CM

## 2018-08-24 DIAGNOSIS — K44.9 HIATAL HERNIA WITH GERD: ICD-10-CM

## 2018-08-24 DIAGNOSIS — I10 ESSENTIAL HYPERTENSION: ICD-10-CM

## 2018-08-24 PROCEDURE — 3017F COLORECTAL CA SCREEN DOC REV: CPT | Performed by: SURGERY

## 2018-08-24 PROCEDURE — G8417 CALC BMI ABV UP PARAM F/U: HCPCS | Performed by: SURGERY

## 2018-08-24 PROCEDURE — G8427 DOCREV CUR MEDS BY ELIG CLIN: HCPCS | Performed by: SURGERY

## 2018-08-24 PROCEDURE — 99213 OFFICE O/P EST LOW 20 MIN: CPT | Performed by: SURGERY

## 2018-08-24 PROCEDURE — 1036F TOBACCO NON-USER: CPT | Performed by: SURGERY

## 2018-08-24 NOTE — PROGRESS NOTES
[facility]) Physicians   General & Laparoscopic Surgery  [employer]       HPI:     Lila Deal is a very pleasant 58 y.o. female with Body mass index is 48.15 kg/m². , Pre-Surgery. Pre-operative clearance and work up pending. Working hard to keep good dietary habits as well level of activity. Patient denies any nausea, vomiting, fevers, chills, shortness of breath, chest pain, cough, constipation or difficulty urinating. Past Medical History:   Diagnosis Date    Arthritis     Back pain     Hypertension     Obesity     Sleep apnea      Past Surgical History:   Procedure Laterality Date    COLONOSCOPY  8/17/2018    COLONOSCOPY POLYPECTOMY SNARE/COLD BIOPSY TRANSVERSE POLYP performed by Lisa Steele MD at [address] [facility] ENDOSCOPY N/A 8/17/2018    EGD BIOPSY GASTRIC ANTRUM BX R/O HP BX/COLD SNARE GASATRIC POLYP performed by Jerson Dutta DO at [address] N/A 8/17/2018    EGD POLYP SNARE performed by Jerson Dutta DO at [facility] ENDOSCOPY     Family History   Problem Relation Age of Onset    High Blood Pressure Mother     Anxiety Disorder Mother     Arthritis Mother      Social History   Substance Use Topics    Smoking status: Never Smoker    Smokeless tobacco: Never Used    Alcohol use No     I counseled the patient on the importance of not smoking and risks of ETOH. No Known Allergies  Vitals:    08/24/18 1359   BP: (!) 107/57   Pulse: 88   Weight: 271 lb 12.8 oz (123.3 kg)   Height: 5' 3\" (1.6 m)       Body mass index is 48.15 kg/m². Current Outpatient Prescriptions:     polyethylene glycol (GOLYTELY) 236 g solution, Please follow prep instructions provided by doctor's office. , Disp: 4000 mL, Rfl: 0    losartan-hydrochlorothiazide (HYZAAR) 50-12.5 MG per tablet, Take 1 tablet by mouth daily, Disp: 30 tablet, Rfl: 5    omeprazole (PRILOSEC) 40 MG delayed release

## 2018-08-24 NOTE — PROGRESS NOTES
Aditya Oz lost 8.2 lbs over 1 month. Breakfast: Banana + yogurt + sausage maricruz/scrambeled egg     Lunch: [employer] (chix/broccoli/carrots)     Snack: 15 grapes or cutie orange + 5 wheat crackers    Dinner: [employer] (Fish/broccoli/apples)    Is pt consuming smaller portions? yes      Is pt consuming at least 64 oz of fluids per day? yes water/crystal light    Is pt consuming carbonated, caffeinated, or sugary beverages? no    Has pt sampled Unjury and/or Nectar protein? Encouraged pt to try grab'n'go's before next appointment.     Exercise: Chair exercise (watches program on TV) + walking more now that she has lost some weight     Plan/Recommendations:   - Sample protein powder  - Add protein w/ fruit  - Continue w/ exercises walking    Handouts: none    [employer]

## 2018-09-18 ENCOUNTER — OFFICE VISIT (OUTPATIENT)
Dept: SLEEP MEDICINE | Age: 63
End: 2018-09-18

## 2018-09-18 VITALS
HEART RATE: 91 BPM | BODY MASS INDEX: 47.31 KG/M2 | OXYGEN SATURATION: 96 % | WEIGHT: 267 LBS | SYSTOLIC BLOOD PRESSURE: 123 MMHG | HEIGHT: 63 IN | DIASTOLIC BLOOD PRESSURE: 83 MMHG

## 2018-09-18 DIAGNOSIS — I10 ESSENTIAL HYPERTENSION: Chronic | ICD-10-CM

## 2018-09-18 DIAGNOSIS — G47.10 HYPERSOMNIA: Primary | ICD-10-CM

## 2018-09-18 DIAGNOSIS — K21.9 GERD WITHOUT ESOPHAGITIS: Chronic | ICD-10-CM

## 2018-09-18 DIAGNOSIS — R06.83 SNORING: ICD-10-CM

## 2018-09-18 DIAGNOSIS — E66.01 MORBID OBESITY, UNSPECIFIED OBESITY TYPE (HCC): Chronic | ICD-10-CM

## 2018-09-18 PROCEDURE — 3017F COLORECTAL CA SCREEN DOC REV: CPT | Performed by: INTERNAL MEDICINE

## 2018-09-18 PROCEDURE — G8417 CALC BMI ABV UP PARAM F/U: HCPCS | Performed by: INTERNAL MEDICINE

## 2018-09-18 PROCEDURE — 99244 OFF/OP CNSLTJ NEW/EST MOD 40: CPT | Performed by: INTERNAL MEDICINE

## 2018-09-18 PROCEDURE — G8427 DOCREV CUR MEDS BY ELIG CLIN: HCPCS | Performed by: INTERNAL MEDICINE

## 2018-09-18 ASSESSMENT — SLEEP AND FATIGUE QUESTIONNAIRES
HOW LIKELY ARE YOU TO NOD OFF OR FALL ASLEEP IN A CAR, WHILE STOPPED FOR A FEW MINUTES IN TRAFFIC: 0
HOW LIKELY ARE YOU TO NOD OFF OR FALL ASLEEP WHILE LYING DOWN TO REST IN THE AFTERNOON WHEN CIRCUMSTANCES PERMIT: 1
HOW LIKELY ARE YOU TO NOD OFF OR FALL ASLEEP WHEN YOU ARE A PASSENGER IN A CAR FOR AN HOUR WITHOUT A BREAK: 0
HOW LIKELY ARE YOU TO NOD OFF OR FALL ASLEEP WHILE SITTING AND READING: 2
HOW LIKELY ARE YOU TO NOD OFF OR FALL ASLEEP WHILE SITTING INACTIVE IN A PUBLIC PLACE: 0
ESS TOTAL SCORE: 5
NECK CIRCUMFERENCE (INCHES): 15
HOW LIKELY ARE YOU TO NOD OFF OR FALL ASLEEP WHILE SITTING QUIETLY AFTER LUNCH WITHOUT ALCOHOL: 0
HOW LIKELY ARE YOU TO NOD OFF OR FALL ASLEEP WHILE SITTING AND TALKING TO SOMEONE: 0
HOW LIKELY ARE YOU TO NOD OFF OR FALL ASLEEP WHILE WATCHING TV: 2

## 2018-09-18 ASSESSMENT — ENCOUNTER SYMPTOMS
APNEA: 1
CHOKING: 0
ALLERGIC/IMMUNOLOGIC NEGATIVE: 1
EYE PAIN: 0
PHOTOPHOBIA: 0
NAUSEA: 0
CHEST TIGHTNESS: 0
VOMITING: 0
RHINORRHEA: 0
ABDOMINAL DISTENTION: 0
SHORTNESS OF BREATH: 0
ABDOMINAL PAIN: 0

## 2018-09-18 NOTE — PROGRESS NOTES
Idalia Lomeli MD, FAASM, [facility]  [facility]  [facility]  3rd Floor,  [address] [facility], [address] [facility]- [phone]   [place]  [address] [place] [address] Mandeep Ralph. Cate Franco [address] [phone]     [address] SLEEP MEDICINE    Assessment:      Visit Diagnoses and Associated Orders     Hypersomnia   (New Problem)  -  Primary    needs work-up    Home Sleep Study (HST) [89587 Custom]   - Future Order         Snoring   (New Problem)      needs work-up    Home Sleep Study (HST) [57851 Custom]   - Future Order         Essential hypertension   (Stable)           GERD without esophagitis   (Stable)           Morbid obesity, unspecified obesity type ([address]   (Stable)                  Plan:      Differential diagnosis includes but not limited to: TERRANCE, PLMD's, narcolepsy, parasomnias. Reviewed TERRANCE (highest likelihood Dx): pathophysiology, diagnosis, complications and treatment. Instructed her not to drive if drowsy. Continue medications per her PCP and other physicians. Limit caffeine use after 3pm. Standard of care is to do in-lab PSG but insurance is mandating an inferior HST. 1 wk follow up after study to review her results. The chronic medical conditions listed are directly related to the primary diagnosis listed above. The management of the primary diagnosis affects the secondary diagnosis and vice versa. Cont meds for hypertension or GERD. Pt would medically benefit from wt loss for TERRANCE (diet, exercise, surgical). Orders Placed This Encounter   Procedures    Home Sleep Study (HST)          Subjective:     Patient ID: Camryn Hargrove is a 58 y.o. female. Chief Complaint   Patient presents with    Snoring       HPI:      Camryn Hargrove is a 58 y.o. female referred by Dr. Trevor Garcia for a sleep evaluation.  She complains of snoring, snorting, periods of not breathing, tossing and turning, excessive daytime sleepiness, awakening in the middle of the distress. She has no decreased breath sounds. She has no wheezes. She has no rhonchi. She has no rales. She exhibits no mass, no tenderness and no deformity. Abdominal: Soft. Bowel sounds are normal. She exhibits no distension. There is no tenderness. Musculoskeletal: She exhibits no edema. Gait - normal  No evidence of cyanosis or clubbing of nails   Lymphadenopathy:        Head (right side): No submental, no submandibular and no tonsillar adenopathy present. Head (left side): No submental, no submandibular and no tonsillar adenopathy present. Neurological: She is alert and oriented to person, place, and time. No cranial nerve deficit. Skin: Skin is warm, dry and intact. No cyanosis. Nails show no clubbing. Psychiatric: She has a normal mood and affect. Her speech is normal and behavior is normal. Judgment and thought content normal.   Nursing note and vitals reviewed.       Electronically signed by Ines Manriquez MD on 9/18/2018 at 3:08 PM

## 2018-09-19 ENCOUNTER — OFFICE VISIT (OUTPATIENT)
Dept: BARIATRICS/WEIGHT MGMT | Age: 63
End: 2018-09-19

## 2018-09-19 VITALS
BODY MASS INDEX: 47.48 KG/M2 | DIASTOLIC BLOOD PRESSURE: 60 MMHG | SYSTOLIC BLOOD PRESSURE: 111 MMHG | HEIGHT: 63 IN | HEART RATE: 89 BPM | WEIGHT: 268 LBS

## 2018-09-19 DIAGNOSIS — K44.9 HIATAL HERNIA WITH GERD: ICD-10-CM

## 2018-09-19 DIAGNOSIS — I10 ESSENTIAL HYPERTENSION: ICD-10-CM

## 2018-09-19 DIAGNOSIS — E66.01 MORBID OBESITY WITH BMI OF 45.0-49.9, ADULT (HCC): Primary | ICD-10-CM

## 2018-09-19 DIAGNOSIS — K21.9 HIATAL HERNIA WITH GERD: ICD-10-CM

## 2018-09-19 PROCEDURE — G8427 DOCREV CUR MEDS BY ELIG CLIN: HCPCS | Performed by: SURGERY

## 2018-09-19 PROCEDURE — 99213 OFFICE O/P EST LOW 20 MIN: CPT | Performed by: SURGERY

## 2018-09-19 PROCEDURE — G8417 CALC BMI ABV UP PARAM F/U: HCPCS | Performed by: SURGERY

## 2018-09-19 PROCEDURE — 1036F TOBACCO NON-USER: CPT | Performed by: SURGERY

## 2018-09-19 PROCEDURE — 3017F COLORECTAL CA SCREEN DOC REV: CPT | Performed by: SURGERY

## 2018-10-02 ENCOUNTER — TELEPHONE (OUTPATIENT)
Dept: SURGERY | Age: 63
End: 2018-10-02

## 2018-10-02 NOTE — TELEPHONE ENCOUNTER
Patient is requesting [facility] paperwork for daughter Yahaira Au be faxed to [phone] ASAP, thank you! Please call patient to notify that it has been faxed.      Madison Philippe [phone] (home)

## 2018-10-03 NOTE — TELEPHONE ENCOUNTER
Patient is calling again for [facility] paperwork for [facility]. Patient states it is extremely important to have th paperwork faxed today. Please advise. Thank you!     [facility] [phone] (home)

## 2018-10-03 NOTE — TELEPHONE ENCOUNTER
Spoke with patient to let her know Jossie Pacheco is out of the office but once she returns I will discuss the [facility] paperwork with her and have her fax it. She understood.

## 2018-10-04 ENCOUNTER — INITIAL CONSULT (OUTPATIENT)
Dept: BARIATRICS/WEIGHT MGMT | Age: 63
End: 2018-10-04
Payer: MEDICAID

## 2018-10-04 DIAGNOSIS — E66.01 MORBID OBESITY WITH BMI OF 50.0-59.9, ADULT (HCC): Primary | ICD-10-CM

## 2018-10-04 PROCEDURE — 90791 PSYCH DIAGNOSTIC EVALUATION: CPT | Performed by: PSYCHOLOGIST

## 2018-10-04 PROCEDURE — 96101 PR PSYCHOLOGIC TESTING BY PSYCH/PHYS: CPT | Performed by: PSYCHOLOGIST

## 2018-10-11 ENCOUNTER — HOSPITAL ENCOUNTER (OUTPATIENT)
Dept: SLEEP CENTER | Age: 63
Discharge: HOME | End: 2018-10-11
Payer: MEDICAID

## 2018-10-11 PROCEDURE — 95806 SLEEP STUDY UNATT&RESP EFFT: CPT

## 2018-10-18 PROCEDURE — 95806 SLEEP STUDY UNATT&RESP EFFT: CPT | Performed by: INTERNAL MEDICINE

## 2018-10-22 ENCOUNTER — TELEPHONE (OUTPATIENT)
Dept: PULMONOLOGY | Age: 63
End: 2018-10-22

## 2018-10-22 NOTE — TELEPHONE ENCOUNTER
Spoke with pt to review HST. Pt prefers her order be sent to [facility]. F/U scheduled and pt was asked to bring her unit.

## 2018-10-25 ENCOUNTER — TELEPHONE (OUTPATIENT)
Dept: PULMONOLOGY | Age: 63
End: 2018-10-25

## 2018-10-25 NOTE — TELEPHONE ENCOUNTER
[address] [facility] called to let us know that they could not service the patient.  We sent the order to [facility]

## 2018-10-31 ENCOUNTER — OFFICE VISIT (OUTPATIENT)
Dept: BARIATRICS/WEIGHT MGMT | Age: 63
End: 2018-10-31
Payer: MEDICAID

## 2018-10-31 VITALS
DIASTOLIC BLOOD PRESSURE: 70 MMHG | WEIGHT: 261.8 LBS | HEART RATE: 100 BPM | HEIGHT: 63 IN | BODY MASS INDEX: 46.39 KG/M2 | SYSTOLIC BLOOD PRESSURE: 125 MMHG

## 2018-10-31 DIAGNOSIS — K44.9 HIATAL HERNIA WITH GERD: ICD-10-CM

## 2018-10-31 DIAGNOSIS — I10 ESSENTIAL HYPERTENSION: ICD-10-CM

## 2018-10-31 DIAGNOSIS — K21.9 HIATAL HERNIA WITH GERD: ICD-10-CM

## 2018-10-31 DIAGNOSIS — E66.01 MORBID OBESITY WITH BMI OF 45.0-49.9, ADULT (HCC): Primary | ICD-10-CM

## 2018-10-31 PROCEDURE — G8427 DOCREV CUR MEDS BY ELIG CLIN: HCPCS | Performed by: SURGERY

## 2018-10-31 PROCEDURE — 99213 OFFICE O/P EST LOW 20 MIN: CPT | Performed by: SURGERY

## 2018-10-31 PROCEDURE — 3017F COLORECTAL CA SCREEN DOC REV: CPT | Performed by: SURGERY

## 2018-10-31 PROCEDURE — G8484 FLU IMMUNIZE NO ADMIN: HCPCS | Performed by: SURGERY

## 2018-10-31 PROCEDURE — G8417 CALC BMI ABV UP PARAM F/U: HCPCS | Performed by: SURGERY

## 2018-10-31 PROCEDURE — 1036F TOBACCO NON-USER: CPT | Performed by: SURGERY

## 2018-11-26 RX ORDER — OMEPRAZOLE 40 MG/1
40 CAPSULE, DELAYED RELEASE ORAL DAILY
Qty: 30 CAPSULE | Refills: 5 | Status: SHIPPED | OUTPATIENT
Start: 2018-11-26 | End: 2019-05-29 | Stop reason: SDUPTHER

## 2018-11-26 NOTE — TELEPHONE ENCOUNTER
Patient requesting a medication refill.   Medication omeprazole (PRILOSEC) 40 MG delayed release capsule   [facility] [address], [address]  Last office visit: 5/25/2018  Next office visit: 12/13/2018

## 2018-11-26 NOTE — PROGRESS NOTES
Patient Name:   Trupti Montelongo       Type of Surgery:  Sleeve         Date of Surgery:  12/21/18       Start Pre-Op Diet On:  12/8/18       Start Clear Liquids On:  12/20/18       If you are having a gastric bypass, start Bowel Prep between 2-4pm the day prior to surgery. NPO after midnight the night before your surgery! Take morning medications with a small amount of water.     NOTES:_______________________________________  ______________________________________________

## 2018-11-28 ENCOUNTER — OFFICE VISIT (OUTPATIENT)
Dept: BARIATRICS/WEIGHT MGMT | Age: 63
End: 2018-11-28
Payer: MEDICAID

## 2018-11-28 VITALS
DIASTOLIC BLOOD PRESSURE: 74 MMHG | SYSTOLIC BLOOD PRESSURE: 119 MMHG | HEART RATE: 80 BPM | WEIGHT: 256.2 LBS | HEIGHT: 63 IN | BODY MASS INDEX: 45.39 KG/M2

## 2018-11-28 DIAGNOSIS — I10 ESSENTIAL HYPERTENSION: ICD-10-CM

## 2018-11-28 DIAGNOSIS — K44.9 HIATAL HERNIA WITH GERD: ICD-10-CM

## 2018-11-28 DIAGNOSIS — E66.01 MORBID OBESITY WITH BMI OF 45.0-49.9, ADULT (HCC): Primary | ICD-10-CM

## 2018-11-28 DIAGNOSIS — K21.9 HIATAL HERNIA WITH GERD: ICD-10-CM

## 2018-11-28 PROCEDURE — G8417 CALC BMI ABV UP PARAM F/U: HCPCS | Performed by: SURGERY

## 2018-11-28 PROCEDURE — G8484 FLU IMMUNIZE NO ADMIN: HCPCS | Performed by: SURGERY

## 2018-11-28 PROCEDURE — 3017F COLORECTAL CA SCREEN DOC REV: CPT | Performed by: SURGERY

## 2018-11-28 PROCEDURE — 1036F TOBACCO NON-USER: CPT | Performed by: SURGERY

## 2018-11-28 PROCEDURE — 99213 OFFICE O/P EST LOW 20 MIN: CPT | Performed by: SURGERY

## 2018-11-28 PROCEDURE — G8427 DOCREV CUR MEDS BY ELIG CLIN: HCPCS | Performed by: SURGERY

## 2018-12-07 ENCOUNTER — TELEPHONE (OUTPATIENT)
Dept: FAMILY MEDICINE CLINIC | Age: 63
End: 2018-12-07

## 2018-12-13 ENCOUNTER — OFFICE VISIT (OUTPATIENT)
Dept: FAMILY MEDICINE CLINIC | Age: 63
End: 2018-12-13
Payer: MEDICAID

## 2018-12-13 VITALS
WEIGHT: 252.6 LBS | HEIGHT: 63 IN | DIASTOLIC BLOOD PRESSURE: 68 MMHG | RESPIRATION RATE: 16 BRPM | HEART RATE: 87 BPM | OXYGEN SATURATION: 98 % | SYSTOLIC BLOOD PRESSURE: 112 MMHG | BODY MASS INDEX: 44.76 KG/M2

## 2018-12-13 DIAGNOSIS — G47.33 OBSTRUCTIVE SLEEP APNEA (ADULT) (PEDIATRIC): ICD-10-CM

## 2018-12-13 DIAGNOSIS — Z11.59 NEED FOR HEPATITIS C SCREENING TEST: ICD-10-CM

## 2018-12-13 DIAGNOSIS — I10 ESSENTIAL HYPERTENSION: ICD-10-CM

## 2018-12-13 DIAGNOSIS — Z11.4 SCREENING FOR HIV (HUMAN IMMUNODEFICIENCY VIRUS): ICD-10-CM

## 2018-12-13 DIAGNOSIS — M25.562 CHRONIC PAIN OF LEFT KNEE: ICD-10-CM

## 2018-12-13 DIAGNOSIS — K76.0 FATTY LIVER DISEASE, NONALCOHOLIC: ICD-10-CM

## 2018-12-13 DIAGNOSIS — R53.83 FATIGUE, UNSPECIFIED TYPE: ICD-10-CM

## 2018-12-13 DIAGNOSIS — G89.29 CHRONIC PAIN OF LEFT KNEE: ICD-10-CM

## 2018-12-13 DIAGNOSIS — E78.00 PURE HYPERCHOLESTEROLEMIA: ICD-10-CM

## 2018-12-13 DIAGNOSIS — Z00.00 ANNUAL PHYSICAL EXAM: Primary | ICD-10-CM

## 2018-12-13 LAB
ALBUMIN SERPL-MCNC: 3.9 G/DL (ref 3.4–5)
ALP BLD-CCNC: 102 U/L (ref 40–129)
ALT SERPL-CCNC: 24 U/L (ref 10–40)
ANION GAP SERPL CALCULATED.3IONS-SCNC: 13 MMOL/L (ref 3–16)
AST SERPL-CCNC: 29 U/L (ref 15–37)
BILIRUB SERPL-MCNC: 0.4 MG/DL (ref 0–1)
BILIRUBIN DIRECT: <0.2 MG/DL (ref 0–0.3)
BILIRUBIN, INDIRECT: NORMAL MG/DL (ref 0–1)
BUN BLDV-MCNC: 20 MG/DL (ref 7–20)
CALCIUM SERPL-MCNC: 9.7 MG/DL (ref 8.3–10.6)
CHLORIDE BLD-SCNC: 106 MMOL/L (ref 99–110)
CHOLESTEROL, TOTAL: 134 MG/DL (ref 0–199)
CO2: 26 MMOL/L (ref 21–32)
CREAT SERPL-MCNC: 0.8 MG/DL (ref 0.6–1.2)
GFR AFRICAN AMERICAN: >60
GFR NON-AFRICAN AMERICAN: >60
GLUCOSE BLD-MCNC: 93 MG/DL (ref 70–99)
HCT VFR BLD CALC: 38.2 % (ref 36–48)
HDLC SERPL-MCNC: 40 MG/DL (ref 40–60)
HEMOGLOBIN: 12.7 G/DL (ref 12–16)
LDL CHOLESTEROL CALCULATED: 81 MG/DL
MCH RBC QN AUTO: 30.8 PG (ref 26–34)
MCHC RBC AUTO-ENTMCNC: 33.2 G/DL (ref 31–36)
MCV RBC AUTO: 92.9 FL (ref 80–100)
PDW BLD-RTO: 14.5 % (ref 12.4–15.4)
PHOSPHORUS: 3.4 MG/DL (ref 2.5–4.9)
PLATELET # BLD: 252 K/UL (ref 135–450)
PMV BLD AUTO: 8 FL (ref 5–10.5)
POTASSIUM SERPL-SCNC: 4.2 MMOL/L (ref 3.5–5.1)
RBC # BLD: 4.11 M/UL (ref 4–5.2)
SODIUM BLD-SCNC: 145 MMOL/L (ref 136–145)
TOTAL PROTEIN: 7.1 G/DL (ref 6.4–8.2)
TRIGL SERPL-MCNC: 66 MG/DL (ref 0–150)
TSH SERPL DL<=0.05 MIU/L-ACNC: 1.46 UIU/ML (ref 0.27–4.2)
VLDLC SERPL CALC-MCNC: 13 MG/DL
WBC # BLD: 6.6 K/UL (ref 4–11)

## 2018-12-13 PROCEDURE — 36415 COLL VENOUS BLD VENIPUNCTURE: CPT | Performed by: INTERNAL MEDICINE

## 2018-12-13 PROCEDURE — G8484 FLU IMMUNIZE NO ADMIN: HCPCS | Performed by: INTERNAL MEDICINE

## 2018-12-13 PROCEDURE — 99396 PREV VISIT EST AGE 40-64: CPT | Performed by: INTERNAL MEDICINE

## 2018-12-13 ASSESSMENT — ENCOUNTER SYMPTOMS
GASTROINTESTINAL NEGATIVE: 1
ALLERGIC/IMMUNOLOGIC NEGATIVE: 1
RESPIRATORY NEGATIVE: 1
EYES NEGATIVE: 1

## 2018-12-13 NOTE — ASSESSMENT & PLAN NOTE
No recent change in meds, no c/o with meds, no chest pain, SOB, palpatations, or syncope. Home bp was not taken. Lost 30 lbs in 8 mo w/ HCTZ and diet.

## 2018-12-13 NOTE — PROGRESS NOTES
vitamin E 400 UNIT capsule Take 400 Units by mouth daily      Compression Stockings MISC by Does not apply route 3 pair. Put on in am and off at bed time. 3 each 0    Multiple Vitamins-Minerals (THERAPEUTIC MULTIVITAMIN-MINERALS) tablet Take 1 tablet by mouth daily       No current facility-administered medications for this visit. No Known Allergies  Social History   Substance Use Topics    Smoking status: Never Smoker    Smokeless tobacco: Never Used    Alcohol use No     Family History   Problem Relation Age of Onset    High Blood Pressure Mother     Anxiety Disorder Mother     Arthritis Mother      Review of Systems   Constitutional: Negative. HENT: Negative. Eyes: Negative. Respiratory: Negative. Cardiovascular: Negative. Gastrointestinal: Negative. Endocrine: Negative. Genitourinary: Negative. Musculoskeletal: Positive for arthralgias. Skin: Negative. Allergic/Immunologic: Negative. Neurological: Negative. Hematological: Negative. Psychiatric/Behavioral: Negative. Objective:   Physical Exam   Constitutional: She is oriented to person, place, and time. Vital signs are normal. She appears well-developed and well-nourished. Non-toxic appearance. She does not have a sickly appearance. She does not appear ill. No distress. OBESE   HENT:   Head: Normocephalic and atraumatic. Right Ear: Hearing, tympanic membrane, external ear and ear canal normal.   Left Ear: Hearing, tympanic membrane, external ear and ear canal normal.   Nose: Nose normal. Right sinus exhibits no maxillary sinus tenderness and no frontal sinus tenderness. Left sinus exhibits no maxillary sinus tenderness and no frontal sinus tenderness. Mouth/Throat: Uvula is midline, oropharynx is clear and moist and mucous membranes are normal. No oropharyngeal exudate. Eyes: Pupils are equal, round, and reactive to light.  Conjunctivae, EOM and lids are normal.   Fundoscopic exam:       The right eye shows no arteriolar narrowing, no AV nicking, no exudate, no hemorrhage and no papilledema. The right eye shows no red reflex and no venous pulsations. The left eye shows no arteriolar narrowing, no AV nicking, no exudate, no hemorrhage and no papilledema. The left eye shows no red reflex and no venous pulsations. GLASSES   Neck: Trachea normal, normal range of motion, full passive range of motion without pain and phonation normal. Neck supple. Normal carotid pulses, no hepatojugular reflux and no JVD present. Carotid bruit is not present. No tracheal deviation present. No thyromegaly present. Cardiovascular: Normal rate, regular rhythm, normal heart sounds, intact distal pulses and normal pulses. No extrasystoles are present. PMI is not displaced. Exam reveals no gallop, no friction rub and no decreased pulses. No murmur heard. Pulses:       Carotid pulses are 2+ on the right side, and 2+ on the left side. Radial pulses are 2+ on the right side, and 2+ on the left side. Femoral pulses are 2+ on the right side, and 2+ on the left side. Popliteal pulses are 2+ on the right side, and 2+ on the left side. Dorsalis pedis pulses are 2+ on the right side, and 2+ on the left side. Posterior tibial pulses are 2+ on the right side, and 2+ on the left side. Pulmonary/Chest: Effort normal and breath sounds normal. No stridor. No respiratory distress. She has no decreased breath sounds. She has no wheezes. She has no rhonchi. She has no rales. She exhibits no tenderness. Right breast exhibits no inverted nipple, no mass, no nipple discharge, no skin change and no tenderness. Left breast exhibits no inverted nipple, no mass, no nipple discharge, no skin change and no tenderness. Breasts are symmetrical. There is no breast swelling. Abdominal: Soft.  Normal appearance, normal aorta and bowel sounds are normal. She exhibits no shifting dullness, no distension, no pulsatile intact. No abrasion and no rash noted. She is not diaphoretic. No cyanosis or erythema. No pallor. Nails show no clubbing. Moderate Venous stasis bilateral.   Psychiatric: She has a normal mood and affect. Her speech is normal and behavior is normal. Judgment and thought content normal. Cognition and memory are normal.       Assessment:      Problem   Annual Physical Exam   Obstructive Sleep Apnea (Adult) (Pediatric). Doing test soon. Essential Hypertension. Good w/ wt loss. Fatty Liver Disease, Nonalcoholic. Great wt loss. Chronic Pain of Left Knee. [place]la OA knees. Better w/ wt loss. Class 3 Obesity Due to Excess Calories With Serious Comorbidity and Body Mass Index (Bmi) of 50.0 to 59.9 in Adult. Good wt loss w/ diet. Gastric sleeve soon. Plan:      All above problems reviewed and the found to be unchanged except for the following :     Annual Physical Exam. Eye exam soon. Vaccination for flu recommended. Obstructive Sleep Apnea (Adult) (Pediatric). To do test soon. Essential Hypertension. Continue present meds. Home BP checks. Call if>140/90. Improve diet. Avoid caffeine and salt. Call if new c/o w/ meds. Fatty Liver Disease, Nonalcoholic. Continue diet and exercise as tolerated. Will do tests. Chronic Pain of Left Knee. Continue wt loss. Class 3 Obesity Due to Excess Calories With Serious Comorbidity and Body Mass Index (Bmi) of 50.0 to 59.9 in Adult. Gastric Sleeve soon.               Joaquin Yarbrough MD

## 2018-12-14 LAB
HEPATITIS C ANTIBODY INTERPRETATION: NORMAL
HIV AG/AB: NORMAL
HIV ANTIGEN: NORMAL
HIV-1 ANTIBODY: NORMAL
HIV-2 AB: NORMAL

## 2018-12-19 ENCOUNTER — OFFICE VISIT (OUTPATIENT)
Dept: BARIATRICS/WEIGHT MGMT | Age: 63
End: 2018-12-19
Payer: MEDICAID

## 2018-12-19 VITALS
BODY MASS INDEX: 44.97 KG/M2 | HEART RATE: 79 BPM | SYSTOLIC BLOOD PRESSURE: 110 MMHG | DIASTOLIC BLOOD PRESSURE: 61 MMHG | HEIGHT: 63 IN | WEIGHT: 253.8 LBS

## 2018-12-19 DIAGNOSIS — E66.01 MORBID OBESITY WITH BMI OF 45.0-49.9, ADULT (HCC): Primary | ICD-10-CM

## 2018-12-19 DIAGNOSIS — I10 ESSENTIAL HYPERTENSION: ICD-10-CM

## 2018-12-19 DIAGNOSIS — K21.9 HIATAL HERNIA WITH GERD: ICD-10-CM

## 2018-12-19 DIAGNOSIS — K44.9 HIATAL HERNIA WITH GERD: ICD-10-CM

## 2018-12-19 DIAGNOSIS — K76.0 FATTY LIVER DISEASE, NONALCOHOLIC: ICD-10-CM

## 2018-12-19 PROCEDURE — G8484 FLU IMMUNIZE NO ADMIN: HCPCS | Performed by: SURGERY

## 2018-12-19 PROCEDURE — 1036F TOBACCO NON-USER: CPT | Performed by: SURGERY

## 2018-12-19 PROCEDURE — G8428 CUR MEDS NOT DOCUMENT: HCPCS | Performed by: SURGERY

## 2018-12-19 PROCEDURE — 99213 OFFICE O/P EST LOW 20 MIN: CPT | Performed by: SURGERY

## 2018-12-19 PROCEDURE — G8417 CALC BMI ABV UP PARAM F/U: HCPCS | Performed by: SURGERY

## 2018-12-19 PROCEDURE — 3017F COLORECTAL CA SCREEN DOC REV: CPT | Performed by: SURGERY

## 2018-12-31 NOTE — PROGRESS NOTES
Laci Esters lost 2.4 lb over past ~3 weeks, down 38 lb total.    Breakfast: yogurt, banana, 2 cuties, 2 turkey sausage patties, 2 eggs     Snack: sometimes yogurt OR 4oz cheese & HB egg     Lunch: 4oz turkey breast w/ 1/2 c broccoli & SF jello cup      Snack: none     Dinner: lean cuisine (chicken w/ veggies) & 4 crackers & yogurt    Snack: 15 grapes & cheese     Fluids: water w/ crystal light     Is pt consuming smaller portions? yes she is mindful of portions     Is pt consuming at least 64 oz of fluids per day? yes      Is pt consuming carbonated, caffeinated, or sugary beverages? no     Has pt sampled Unjury and/or Nectar protein?  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
instructions provided by doctor's office. , Disp: 4000 mL, Rfl: 0    losartan-hydrochlorothiazide (HYZAAR) 50-12.5 MG per tablet, Take 1 tablet by mouth daily, Disp: 30 tablet, Rfl: 5    hydrochlorothiazide (HYDRODIURIL) 12.5 MG tablet, Take 1 tablet by mouth daily, Disp: 30 tablet, Rfl: 3    vitamin E 400 UNIT capsule, Take 400 Units by mouth daily, Disp: , Rfl:     Multiple Vitamins-Minerals (THERAPEUTIC MULTIVITAMIN-MINERALS) tablet, Take 1 tablet by mouth daily, Disp: , Rfl:       Review of Systems - History obtained from the patient  General ROS: negative  Psychological ROS: negative  Endocrine ROS: negative  Respiratory ROS: negative  Cardiovascular ROS: negative  Gastrointestinal ROS:negative  Genito-Urinary ROS: negative  Musculoskeletal ROS: negative   Skin ROS: negative    Physical Exam   Vitals Reviewed   Constitutional: Patient is oriented to person, place, and time. Patient appears well-developed and well-nourished. Patient is active and cooperative. Non-toxic appearance. No distress. Neck: Trachea normal and normal range of motion. No JVD present. Pulmonary/Chest: Effort normal. No accessory muscle usage or stridor. No apnea. No respiratory distress. Cardiovascular: Normal rate and no JVD. Abdominal: Normal appearance. Patient exhibits no distension. Abdomen is soft, obese, non tender. Musculoskeletal: Normal range of motion. Patient exhibits no edema. Neurological: Patient is alert and oriented to person, place, and time. Patient has normal strength. GCS eye subscore is 4. GCS verbal subscore is 5. GCS motor subscore is 6. Skin: Skin is warm and dry. No abrasion and no rash noted. Patient is not diaphoretic. No cyanosis or erythema. Psychiatric: Patient has a normal mood and affect. Speech is normal and behavior is normal. Cognition and memory are normal.       A/P    Jackeline Barnes is 58 y.o. female, Body mass index is 44.96 kg/m². pre surgery, has lost 2.4# since last visit.  The

## 2019-01-09 ENCOUNTER — TELEPHONE (OUTPATIENT)
Dept: FAMILY MEDICINE CLINIC | Age: 64
End: 2019-01-09

## 2019-01-12 PROBLEM — Z00.00 ANNUAL PHYSICAL EXAM: Status: RESOLVED | Noted: 2018-12-13 | Resolved: 2019-01-12

## 2019-01-14 ENCOUNTER — OFFICE VISIT (OUTPATIENT)
Dept: PULMONOLOGY | Age: 64
End: 2019-01-14
Payer: MEDICARE

## 2019-01-14 VITALS
HEART RATE: 75 BPM | DIASTOLIC BLOOD PRESSURE: 74 MMHG | BODY MASS INDEX: 44.83 KG/M2 | SYSTOLIC BLOOD PRESSURE: 126 MMHG | HEIGHT: 63 IN | WEIGHT: 253 LBS | OXYGEN SATURATION: 98 %

## 2019-01-14 DIAGNOSIS — E66.2 CLASS 3 OBESITY WITH ALVEOLAR HYPOVENTILATION WITHOUT SERIOUS COMORBIDITY WITH BODY MASS INDEX (BMI) OF 40.0 TO 44.9 IN ADULT (HCC): ICD-10-CM

## 2019-01-14 DIAGNOSIS — G47.33 OBSTRUCTIVE SLEEP APNEA (ADULT) (PEDIATRIC): Primary | ICD-10-CM

## 2019-01-14 DIAGNOSIS — I10 ESSENTIAL HYPERTENSION: ICD-10-CM

## 2019-01-14 PROCEDURE — 99214 OFFICE O/P EST MOD 30 MIN: CPT | Performed by: NURSE PRACTITIONER

## 2019-01-14 PROCEDURE — 3017F COLORECTAL CA SCREEN DOC REV: CPT | Performed by: NURSE PRACTITIONER

## 2019-01-14 PROCEDURE — 1036F TOBACCO NON-USER: CPT | Performed by: NURSE PRACTITIONER

## 2019-01-14 PROCEDURE — G8417 CALC BMI ABV UP PARAM F/U: HCPCS | Performed by: NURSE PRACTITIONER

## 2019-01-14 PROCEDURE — G8484 FLU IMMUNIZE NO ADMIN: HCPCS | Performed by: NURSE PRACTITIONER

## 2019-01-14 PROCEDURE — G8427 DOCREV CUR MEDS BY ELIG CLIN: HCPCS | Performed by: NURSE PRACTITIONER

## 2019-01-14 ASSESSMENT — SLEEP AND FATIGUE QUESTIONNAIRES
HOW LIKELY ARE YOU TO NOD OFF OR FALL ASLEEP WHILE SITTING QUIETLY AFTER LUNCH WITHOUT ALCOHOL: 0
HOW LIKELY ARE YOU TO NOD OFF OR FALL ASLEEP WHEN YOU ARE A PASSENGER IN A CAR FOR AN HOUR WITHOUT A BREAK: 0
HOW LIKELY ARE YOU TO NOD OFF OR FALL ASLEEP WHILE LYING DOWN TO REST IN THE AFTERNOON WHEN CIRCUMSTANCES PERMIT: 1
HOW LIKELY ARE YOU TO NOD OFF OR FALL ASLEEP WHILE WATCHING TV: 2
HOW LIKELY ARE YOU TO NOD OFF OR FALL ASLEEP WHILE SITTING AND READING: 1
HOW LIKELY ARE YOU TO NOD OFF OR FALL ASLEEP IN A CAR, WHILE STOPPED FOR A FEW MINUTES IN TRAFFIC: 0
HOW LIKELY ARE YOU TO NOD OFF OR FALL ASLEEP WHILE SITTING AND TALKING TO SOMEONE: 0
HOW LIKELY ARE YOU TO NOD OFF OR FALL ASLEEP WHILE SITTING INACTIVE IN A PUBLIC PLACE: 0
ESS TOTAL SCORE: 4

## 2019-01-14 ASSESSMENT — ENCOUNTER SYMPTOMS
SINUS PRESSURE: 0
SHORTNESS OF BREATH: 0
ABDOMINAL DISTENTION: 0
COUGH: 0
APNEA: 0
RHINORRHEA: 0
EYE PAIN: 0
ABDOMINAL PAIN: 0
EYE REDNESS: 0

## 2019-01-16 ENCOUNTER — OFFICE VISIT (OUTPATIENT)
Dept: BARIATRICS/WEIGHT MGMT | Age: 64
End: 2019-01-16
Payer: MEDICARE

## 2019-01-16 ENCOUNTER — TELEPHONE (OUTPATIENT)
Dept: BARIATRICS/WEIGHT MGMT | Age: 64
End: 2019-01-16

## 2019-01-16 VITALS
HEART RATE: 97 BPM | BODY MASS INDEX: 43.91 KG/M2 | SYSTOLIC BLOOD PRESSURE: 124 MMHG | WEIGHT: 247.8 LBS | HEIGHT: 63 IN | DIASTOLIC BLOOD PRESSURE: 65 MMHG

## 2019-01-16 DIAGNOSIS — K21.9 HIATAL HERNIA WITH GERD: ICD-10-CM

## 2019-01-16 DIAGNOSIS — K76.0 FATTY LIVER DISEASE, NONALCOHOLIC: ICD-10-CM

## 2019-01-16 DIAGNOSIS — I10 ESSENTIAL HYPERTENSION: ICD-10-CM

## 2019-01-16 DIAGNOSIS — K44.9 HIATAL HERNIA WITH GERD: ICD-10-CM

## 2019-01-16 DIAGNOSIS — E66.01 MORBID OBESITY WITH BMI OF 45.0-49.9, ADULT (HCC): Primary | ICD-10-CM

## 2019-01-16 PROCEDURE — 99214 OFFICE O/P EST MOD 30 MIN: CPT | Performed by: SURGERY

## 2019-01-16 PROCEDURE — 1036F TOBACCO NON-USER: CPT | Performed by: SURGERY

## 2019-01-16 PROCEDURE — G8427 DOCREV CUR MEDS BY ELIG CLIN: HCPCS | Performed by: SURGERY

## 2019-01-16 PROCEDURE — G8484 FLU IMMUNIZE NO ADMIN: HCPCS | Performed by: SURGERY

## 2019-01-16 PROCEDURE — G8417 CALC BMI ABV UP PARAM F/U: HCPCS | Performed by: SURGERY

## 2019-01-16 PROCEDURE — 3017F COLORECTAL CA SCREEN DOC REV: CPT | Performed by: SURGERY

## 2019-01-16 ASSESSMENT — ENCOUNTER SYMPTOMS
EYES NEGATIVE: 1
RESPIRATORY NEGATIVE: 1
ALLERGIC/IMMUNOLOGIC NEGATIVE: 1
GASTROINTESTINAL NEGATIVE: 1

## 2019-01-17 ENCOUNTER — OFFICE VISIT (OUTPATIENT)
Dept: BARIATRICS/WEIGHT MGMT | Age: 64
End: 2019-01-17
Payer: MEDICARE

## 2019-01-17 VITALS
HEART RATE: 94 BPM | OXYGEN SATURATION: 92 % | BODY MASS INDEX: 44.12 KG/M2 | RESPIRATION RATE: 16 BRPM | WEIGHT: 249 LBS | HEIGHT: 63 IN | DIASTOLIC BLOOD PRESSURE: 70 MMHG | SYSTOLIC BLOOD PRESSURE: 124 MMHG

## 2019-01-17 DIAGNOSIS — E66.01 MORBID OBESITY WITH BMI OF 40.0-44.9, ADULT (HCC): ICD-10-CM

## 2019-01-17 DIAGNOSIS — K44.9 HIATAL HERNIA WITH GERD: ICD-10-CM

## 2019-01-17 DIAGNOSIS — K21.9 HIATAL HERNIA WITH GERD: ICD-10-CM

## 2019-01-17 DIAGNOSIS — G47.33 OBSTRUCTIVE SLEEP APNEA (ADULT) (PEDIATRIC): Primary | ICD-10-CM

## 2019-01-17 DIAGNOSIS — I10 ESSENTIAL HYPERTENSION: ICD-10-CM

## 2019-01-17 DIAGNOSIS — K76.0 FATTY LIVER DISEASE, NONALCOHOLIC: ICD-10-CM

## 2019-01-17 PROCEDURE — 99213 OFFICE O/P EST LOW 20 MIN: CPT | Performed by: NURSE PRACTITIONER

## 2019-01-17 PROCEDURE — G8427 DOCREV CUR MEDS BY ELIG CLIN: HCPCS | Performed by: NURSE PRACTITIONER

## 2019-01-17 PROCEDURE — 1036F TOBACCO NON-USER: CPT | Performed by: NURSE PRACTITIONER

## 2019-01-17 PROCEDURE — 3017F COLORECTAL CA SCREEN DOC REV: CPT | Performed by: NURSE PRACTITIONER

## 2019-01-17 PROCEDURE — G8417 CALC BMI ABV UP PARAM F/U: HCPCS | Performed by: NURSE PRACTITIONER

## 2019-01-17 PROCEDURE — G8484 FLU IMMUNIZE NO ADMIN: HCPCS | Performed by: NURSE PRACTITIONER

## 2019-01-17 ASSESSMENT — ENCOUNTER SYMPTOMS
SHORTNESS OF BREATH: 0
DIARRHEA: 0
COUGH: 0
CONSTIPATION: 0
ABDOMINAL PAIN: 0
NAUSEA: 0

## 2019-01-18 ENCOUNTER — TELEPHONE (OUTPATIENT)
Dept: BARIATRICS/WEIGHT MGMT | Age: 64
End: 2019-01-18

## 2019-01-28 ENCOUNTER — OFFICE VISIT (OUTPATIENT)
Dept: FAMILY MEDICINE CLINIC | Age: 64
End: 2019-01-28
Payer: MEDICARE

## 2019-01-28 VITALS
BODY MASS INDEX: 43.52 KG/M2 | SYSTOLIC BLOOD PRESSURE: 112 MMHG | RESPIRATION RATE: 16 BRPM | HEIGHT: 63 IN | TEMPERATURE: 97.9 F | HEART RATE: 83 BPM | WEIGHT: 245.6 LBS | DIASTOLIC BLOOD PRESSURE: 56 MMHG | OXYGEN SATURATION: 98 %

## 2019-01-28 DIAGNOSIS — Z01.818 PRE-OP EXAMINATION: Primary | ICD-10-CM

## 2019-01-28 PROCEDURE — G8417 CALC BMI ABV UP PARAM F/U: HCPCS | Performed by: INTERNAL MEDICINE

## 2019-01-28 PROCEDURE — 4040F PNEUMOC VAC/ADMIN/RCVD: CPT | Performed by: INTERNAL MEDICINE

## 2019-01-28 PROCEDURE — 1123F ACP DISCUSS/DSCN MKR DOCD: CPT | Performed by: INTERNAL MEDICINE

## 2019-01-28 PROCEDURE — 1090F PRES/ABSN URINE INCON ASSESS: CPT | Performed by: INTERNAL MEDICINE

## 2019-01-28 PROCEDURE — G8428 CUR MEDS NOT DOCUMENT: HCPCS | Performed by: INTERNAL MEDICINE

## 2019-01-28 PROCEDURE — 3017F COLORECTAL CA SCREEN DOC REV: CPT | Performed by: INTERNAL MEDICINE

## 2019-01-28 PROCEDURE — 99214 OFFICE O/P EST MOD 30 MIN: CPT | Performed by: INTERNAL MEDICINE

## 2019-01-28 PROCEDURE — G8484 FLU IMMUNIZE NO ADMIN: HCPCS | Performed by: INTERNAL MEDICINE

## 2019-01-28 PROCEDURE — 1101F PT FALLS ASSESS-DOCD LE1/YR: CPT | Performed by: INTERNAL MEDICINE

## 2019-01-28 PROCEDURE — G8400 PT W/DXA NO RESULTS DOC: HCPCS | Performed by: INTERNAL MEDICINE

## 2019-01-28 PROCEDURE — 1036F TOBACCO NON-USER: CPT | Performed by: INTERNAL MEDICINE

## 2019-01-28 RX ORDER — LOSARTAN POTASSIUM AND HYDROCHLOROTHIAZIDE 12.5; 5 MG/1; MG/1
1 TABLET ORAL DAILY
Qty: 30 TABLET | Refills: 5 | Status: SHIPPED | OUTPATIENT
Start: 2019-01-28 | End: 2019-01-31 | Stop reason: SDUPTHER

## 2019-01-31 ENCOUNTER — TELEPHONE (OUTPATIENT)
Dept: FAMILY MEDICINE CLINIC | Age: 64
End: 2019-01-31

## 2019-01-31 DIAGNOSIS — Z12.11 SCREENING FOR COLON CANCER: ICD-10-CM

## 2019-01-31 RX ORDER — LOSARTAN POTASSIUM AND HYDROCHLOROTHIAZIDE 12.5; 5 MG/1; MG/1
1 TABLET ORAL DAILY
Qty: 30 TABLET | Refills: 5 | Status: SHIPPED | OUTPATIENT
Start: 2019-01-31 | End: 2019-07-29

## 2019-02-07 ENCOUNTER — TELEPHONE (OUTPATIENT)
Dept: BARIATRICS/WEIGHT MGMT | Age: 64
End: 2019-02-07

## 2019-02-07 ENCOUNTER — HOSPITAL ENCOUNTER (OUTPATIENT)
Age: 64
Discharge: HOME | End: 2019-02-07
Payer: MEDICARE

## 2019-02-07 LAB
A/G RATIO: 1.3 (ref 1.1–2.2)
ALBUMIN SERPL-MCNC: 4.4 G/DL (ref 3.4–5)
ALP BLD-CCNC: 95 U/L (ref 40–129)
ALT SERPL-CCNC: 26 U/L (ref 10–40)
ANION GAP SERPL CALCULATED.3IONS-SCNC: 15 MMOL/L (ref 3–16)
AST SERPL-CCNC: 36 U/L (ref 15–37)
BILIRUB SERPL-MCNC: 0.5 MG/DL (ref 0–1)
BUN BLDV-MCNC: 20 MG/DL (ref 7–20)
CALCIUM SERPL-MCNC: 10.1 MG/DL (ref 8.3–10.6)
CHLORIDE BLD-SCNC: 97 MMOL/L (ref 99–110)
CO2: 24 MMOL/L (ref 21–32)
CREAT SERPL-MCNC: 0.9 MG/DL (ref 0.6–1.2)
EKG ATRIAL RATE: 81 BPM
EKG DIAGNOSIS: NORMAL
EKG P AXIS: 66 DEGREES
EKG P-R INTERVAL: 162 MS
EKG Q-T INTERVAL: 392 MS
EKG QRS DURATION: 82 MS
EKG QTC CALCULATION (BAZETT): 455 MS
EKG R AXIS: 52 DEGREES
EKG T AXIS: 47 DEGREES
EKG VENTRICULAR RATE: 81 BPM
GFR AFRICAN AMERICAN: >60
GFR NON-AFRICAN AMERICAN: >60
GLOBULIN: 3.3 G/DL
GLUCOSE BLD-MCNC: 96 MG/DL (ref 70–99)
HCT VFR BLD CALC: 38 % (ref 36–48)
HEMOGLOBIN: 13.6 G/DL (ref 12–16)
MCH RBC QN AUTO: 30.9 PG (ref 26–34)
MCHC RBC AUTO-ENTMCNC: 35.9 G/DL (ref 31–36)
MCV RBC AUTO: 86.1 FL (ref 80–100)
PDW BLD-RTO: 13.3 % (ref 12.4–15.4)
PLATELET # BLD: 300 K/UL (ref 135–450)
PMV BLD AUTO: 7.5 FL (ref 5–10.5)
POTASSIUM SERPL-SCNC: 4.4 MMOL/L (ref 3.5–5.1)
RBC # BLD: 4.41 M/UL (ref 4–5.2)
SODIUM BLD-SCNC: 136 MMOL/L (ref 136–145)
TOTAL PROTEIN: 7.7 G/DL (ref 6.4–8.2)
WBC # BLD: 6.7 K/UL (ref 4–11)

## 2019-02-07 PROCEDURE — 93005 ELECTROCARDIOGRAM TRACING: CPT | Performed by: INTERNAL MEDICINE

## 2019-02-07 PROCEDURE — 93010 ELECTROCARDIOGRAM REPORT: CPT | Performed by: INTERNAL MEDICINE

## 2019-02-12 ENCOUNTER — HOSPITAL ENCOUNTER (INPATIENT)
Age: 64
LOS: 3 days | Discharge: HOME | DRG: 164 | End: 2019-02-15
Attending: SURGERY | Admitting: SURGERY
Payer: MEDICARE

## 2019-02-12 ENCOUNTER — ANESTHESIA EVENT (OUTPATIENT)
Dept: OPERATING ROOM | Age: 64
DRG: 164 | End: 2019-02-12
Payer: MEDICARE

## 2019-02-12 ENCOUNTER — ANESTHESIA (OUTPATIENT)
Dept: OPERATING ROOM | Age: 64
DRG: 164 | End: 2019-02-12
Payer: MEDICARE

## 2019-02-12 VITALS — OXYGEN SATURATION: 97 % | SYSTOLIC BLOOD PRESSURE: 110 MMHG | DIASTOLIC BLOOD PRESSURE: 56 MMHG | TEMPERATURE: 96.8 F

## 2019-02-12 DIAGNOSIS — K44.9 HIATAL HERNIA WITH GERD: Primary | ICD-10-CM

## 2019-02-12 DIAGNOSIS — K21.9 HIATAL HERNIA WITH GERD: Primary | ICD-10-CM

## 2019-02-12 DIAGNOSIS — E66.01 MORBID OBESITY WITH BMI OF 40.0-44.9, ADULT (HCC): ICD-10-CM

## 2019-02-12 LAB
ABO/RH: NORMAL
ANTIBODY SCREEN: NORMAL
GLUCOSE BLD-MCNC: 88 MG/DL (ref 70–99)
PERFORMED ON: NORMAL

## 2019-02-12 PROCEDURE — 6360000002 HC RX W HCPCS: Performed by: SURGERY

## 2019-02-12 PROCEDURE — 2500000003 HC RX 250 WO HCPCS: Performed by: SURGERY

## 2019-02-12 PROCEDURE — L0625 LO FLEX L1-BELOW L5 PRE OTS: HCPCS | Performed by: SURGERY

## 2019-02-12 PROCEDURE — 0BQT4ZZ REPAIR DIAPHRAGM, PERCUTANEOUS ENDOSCOPIC APPROACH: ICD-10-PCS | Performed by: SURGERY

## 2019-02-12 PROCEDURE — 2580000003 HC RX 258: Performed by: SURGERY

## 2019-02-12 PROCEDURE — 3600000019 HC SURGERY ROBOT ADDTL 15MIN: Performed by: SURGERY

## 2019-02-12 PROCEDURE — 6360000002 HC RX W HCPCS: Performed by: NURSE ANESTHETIST, CERTIFIED REGISTERED

## 2019-02-12 PROCEDURE — 7100000001 HC PACU RECOVERY - ADDTL 15 MIN: Performed by: SURGERY

## 2019-02-12 PROCEDURE — S2900 ROBOTIC SURGICAL SYSTEM: HCPCS | Performed by: SURGERY

## 2019-02-12 PROCEDURE — 2500000003 HC RX 250 WO HCPCS: Performed by: NURSE ANESTHETIST, CERTIFIED REGISTERED

## 2019-02-12 PROCEDURE — 2709999900 HC NON-CHARGEABLE SUPPLY: Performed by: SURGERY

## 2019-02-12 PROCEDURE — 3700000000 HC ANESTHESIA ATTENDED CARE: Performed by: SURGERY

## 2019-02-12 PROCEDURE — 43281 LAP PARAESOPHAG HERN REPAIR: CPT | Performed by: SURGERY

## 2019-02-12 PROCEDURE — 8E0W4CZ ROBOTIC ASSISTED PROCEDURE OF TRUNK REGION, PERCUTANEOUS ENDOSCOPIC APPROACH: ICD-10-PCS | Performed by: SURGERY

## 2019-02-12 PROCEDURE — 88307 TISSUE EXAM BY PATHOLOGIST: CPT

## 2019-02-12 PROCEDURE — 86901 BLOOD TYPING SEROLOGIC RH(D): CPT

## 2019-02-12 PROCEDURE — 7100000000 HC PACU RECOVERY - FIRST 15 MIN: Performed by: SURGERY

## 2019-02-12 PROCEDURE — 6370000000 HC RX 637 (ALT 250 FOR IP): Performed by: SURGERY

## 2019-02-12 PROCEDURE — 3600000009 HC SURGERY ROBOT BASE: Performed by: SURGERY

## 2019-02-12 PROCEDURE — 0DB64Z3 EXCISION OF STOMACH, PERCUTANEOUS ENDOSCOPIC APPROACH, VERTICAL: ICD-10-PCS | Performed by: SURGERY

## 2019-02-12 PROCEDURE — 86900 BLOOD TYPING SEROLOGIC ABO: CPT

## 2019-02-12 PROCEDURE — 2580000003 HC RX 258: Performed by: NURSE ANESTHETIST, CERTIFIED REGISTERED

## 2019-02-12 PROCEDURE — 2720000010 HC SURG SUPPLY STERILE: Performed by: SURGERY

## 2019-02-12 PROCEDURE — 88342 IMHCHEM/IMCYTCHM 1ST ANTB: CPT

## 2019-02-12 PROCEDURE — 3700000001 HC ADD 15 MINUTES (ANESTHESIA): Performed by: SURGERY

## 2019-02-12 PROCEDURE — 86850 RBC ANTIBODY SCREEN: CPT

## 2019-02-12 PROCEDURE — 1200000000 HC SEMI PRIVATE

## 2019-02-12 PROCEDURE — 43775 LAP SLEEVE GASTRECTOMY: CPT | Performed by: SURGERY

## 2019-02-12 PROCEDURE — C9113 INJ PANTOPRAZOLE SODIUM, VIA: HCPCS | Performed by: SURGERY

## 2019-02-12 RX ORDER — SCOLOPAMINE TRANSDERMAL SYSTEM 1 MG/1
1 PATCH, EXTENDED RELEASE TRANSDERMAL
Status: DISCONTINUED | OUTPATIENT
Start: 2019-02-12 | End: 2019-02-14

## 2019-02-12 RX ORDER — SCOLOPAMINE TRANSDERMAL SYSTEM 1 MG/1
1 PATCH, EXTENDED RELEASE TRANSDERMAL ONCE
Status: DISCONTINUED | OUTPATIENT
Start: 2019-02-12 | End: 2019-02-14

## 2019-02-12 RX ORDER — MAGNESIUM HYDROXIDE 1200 MG/15ML
LIQUID ORAL CONTINUOUS PRN
Status: COMPLETED | OUTPATIENT
Start: 2019-02-12 | End: 2019-02-12

## 2019-02-12 RX ORDER — METOCLOPRAMIDE HYDROCHLORIDE 5 MG/ML
10 INJECTION INTRAMUSCULAR; INTRAVENOUS EVERY 6 HOURS PRN
Status: DISCONTINUED | OUTPATIENT
Start: 2019-02-12 | End: 2019-02-13

## 2019-02-12 RX ORDER — SODIUM CHLORIDE 0.9 % (FLUSH) 0.9 %
10 SYRINGE (ML) INJECTION PRN
Status: DISCONTINUED | OUTPATIENT
Start: 2019-02-12 | End: 2019-02-15 | Stop reason: HOSPADM

## 2019-02-12 RX ORDER — NALOXONE HYDROCHLORIDE 0.4 MG/ML
0.4 INJECTION, SOLUTION INTRAMUSCULAR; INTRAVENOUS; SUBCUTANEOUS PRN
Status: DISCONTINUED | OUTPATIENT
Start: 2019-02-12 | End: 2019-02-14

## 2019-02-12 RX ORDER — SODIUM CHLORIDE 0.9 % (FLUSH) 0.9 %
10 SYRINGE (ML) INJECTION EVERY 12 HOURS SCHEDULED
Status: DISCONTINUED | OUTPATIENT
Start: 2019-02-12 | End: 2019-02-15 | Stop reason: HOSPADM

## 2019-02-12 RX ORDER — LABETALOL HYDROCHLORIDE 5 MG/ML
5 INJECTION, SOLUTION INTRAVENOUS EVERY 10 MIN PRN
Status: DISCONTINUED | OUTPATIENT
Start: 2019-02-12 | End: 2019-02-12 | Stop reason: HOSPADM

## 2019-02-12 RX ORDER — BUPIVACAINE HYDROCHLORIDE 5 MG/ML
INJECTION, SOLUTION EPIDURAL; INTRACAUDAL PRN
Status: DISCONTINUED | OUTPATIENT
Start: 2019-02-12 | End: 2019-02-12 | Stop reason: HOSPADM

## 2019-02-12 RX ORDER — FENTANYL CITRATE 50 UG/ML
50 INJECTION, SOLUTION INTRAMUSCULAR; INTRAVENOUS EVERY 5 MIN PRN
Status: DISCONTINUED | OUTPATIENT
Start: 2019-02-12 | End: 2019-02-12 | Stop reason: HOSPADM

## 2019-02-12 RX ORDER — 0.9 % SODIUM CHLORIDE 0.9 %
10 VIAL (ML) INJECTION DAILY
Status: DISCONTINUED | OUTPATIENT
Start: 2019-02-12 | End: 2019-02-15 | Stop reason: HOSPADM

## 2019-02-12 RX ORDER — ONDANSETRON 2 MG/ML
4 INJECTION INTRAMUSCULAR; INTRAVENOUS
Status: DISCONTINUED | OUTPATIENT
Start: 2019-02-12 | End: 2019-02-12 | Stop reason: HOSPADM

## 2019-02-12 RX ORDER — SODIUM CHLORIDE, SODIUM LACTATE, POTASSIUM CHLORIDE, CALCIUM CHLORIDE 600; 310; 30; 20 MG/100ML; MG/100ML; MG/100ML; MG/100ML
INJECTION, SOLUTION INTRAVENOUS CONTINUOUS
Status: DISCONTINUED | OUTPATIENT
Start: 2019-02-12 | End: 2019-02-14

## 2019-02-12 RX ORDER — MORPHINE SULFATE 4 MG/ML
2 INJECTION, SOLUTION INTRAMUSCULAR; INTRAVENOUS EVERY 5 MIN PRN
Status: DISCONTINUED | OUTPATIENT
Start: 2019-02-12 | End: 2019-02-12 | Stop reason: HOSPADM

## 2019-02-12 RX ORDER — DIPHENHYDRAMINE HYDROCHLORIDE 50 MG/ML
INJECTION INTRAMUSCULAR; INTRAVENOUS PRN
Status: DISCONTINUED | OUTPATIENT
Start: 2019-02-12 | End: 2019-02-12 | Stop reason: SDUPTHER

## 2019-02-12 RX ORDER — ROCURONIUM BROMIDE 10 MG/ML
INJECTION, SOLUTION INTRAVENOUS PRN
Status: DISCONTINUED | OUTPATIENT
Start: 2019-02-12 | End: 2019-02-12 | Stop reason: SDUPTHER

## 2019-02-12 RX ORDER — PROPOFOL 10 MG/ML
INJECTION, EMULSION INTRAVENOUS PRN
Status: DISCONTINUED | OUTPATIENT
Start: 2019-02-12 | End: 2019-02-12 | Stop reason: SDUPTHER

## 2019-02-12 RX ORDER — CEFAZOLIN SODIUM 2 G/50ML
2 SOLUTION INTRAVENOUS ONCE
Status: DISCONTINUED | OUTPATIENT
Start: 2019-02-12 | End: 2019-02-12 | Stop reason: HOSPADM

## 2019-02-12 RX ORDER — SODIUM CHLORIDE 9 MG/ML
INJECTION, SOLUTION INTRAVENOUS CONTINUOUS
Status: DISCONTINUED | OUTPATIENT
Start: 2019-02-12 | End: 2019-02-14

## 2019-02-12 RX ORDER — ONDANSETRON 2 MG/ML
INJECTION INTRAMUSCULAR; INTRAVENOUS PRN
Status: DISCONTINUED | OUTPATIENT
Start: 2019-02-12 | End: 2019-02-12 | Stop reason: SDUPTHER

## 2019-02-12 RX ORDER — PANTOPRAZOLE SODIUM 40 MG/10ML
40 INJECTION, POWDER, LYOPHILIZED, FOR SOLUTION INTRAVENOUS DAILY
Status: DISCONTINUED | OUTPATIENT
Start: 2019-02-12 | End: 2019-02-15 | Stop reason: HOSPADM

## 2019-02-12 RX ORDER — HYDROMORPHONE HCL 110MG/55ML
PATIENT CONTROLLED ANALGESIA SYRINGE INTRAVENOUS PRN
Status: DISCONTINUED | OUTPATIENT
Start: 2019-02-12 | End: 2019-02-12 | Stop reason: SDUPTHER

## 2019-02-12 RX ORDER — SODIUM CHLORIDE 0.9 % (FLUSH) 0.9 %
10 SYRINGE (ML) INJECTION EVERY 12 HOURS SCHEDULED
Status: DISCONTINUED | OUTPATIENT
Start: 2019-02-12 | End: 2019-02-12 | Stop reason: HOSPADM

## 2019-02-12 RX ORDER — SUCCINYLCHOLINE/SOD CL,ISO/PF 100 MG/5ML
SYRINGE (ML) INTRAVENOUS PRN
Status: DISCONTINUED | OUTPATIENT
Start: 2019-02-12 | End: 2019-02-12 | Stop reason: SDUPTHER

## 2019-02-12 RX ORDER — LIDOCAINE HYDROCHLORIDE 20 MG/ML
INJECTION, SOLUTION INFILTRATION; PERINEURAL PRN
Status: DISCONTINUED | OUTPATIENT
Start: 2019-02-12 | End: 2019-02-12 | Stop reason: SDUPTHER

## 2019-02-12 RX ORDER — 0.9 % SODIUM CHLORIDE 0.9 %
1000 INTRAVENOUS SOLUTION INTRAVENOUS ONCE
Status: COMPLETED | OUTPATIENT
Start: 2019-02-12 | End: 2019-02-13

## 2019-02-12 RX ORDER — ONDANSETRON 2 MG/ML
4 INJECTION INTRAMUSCULAR; INTRAVENOUS EVERY 6 HOURS PRN
Status: DISCONTINUED | OUTPATIENT
Start: 2019-02-12 | End: 2019-02-15 | Stop reason: HOSPADM

## 2019-02-12 RX ORDER — APREPITANT 40 MG/1
80 CAPSULE ORAL ONCE
Status: COMPLETED | OUTPATIENT
Start: 2019-02-12 | End: 2019-02-12

## 2019-02-12 RX ORDER — SODIUM CHLORIDE, SODIUM LACTATE, POTASSIUM CHLORIDE, CALCIUM CHLORIDE 600; 310; 30; 20 MG/100ML; MG/100ML; MG/100ML; MG/100ML
INJECTION, SOLUTION INTRAVENOUS CONTINUOUS PRN
Status: COMPLETED | OUTPATIENT
Start: 2019-02-12 | End: 2019-02-12

## 2019-02-12 RX ORDER — SODIUM CHLORIDE, SODIUM LACTATE, POTASSIUM CHLORIDE, CALCIUM CHLORIDE 600; 310; 30; 20 MG/100ML; MG/100ML; MG/100ML; MG/100ML
INJECTION, SOLUTION INTRAVENOUS CONTINUOUS PRN
Status: DISCONTINUED | OUTPATIENT
Start: 2019-02-12 | End: 2019-02-12 | Stop reason: SDUPTHER

## 2019-02-12 RX ORDER — METHYLENE BLUE 10 MG/ML
INJECTION INTRAVENOUS PRN
Status: DISCONTINUED | OUTPATIENT
Start: 2019-02-12 | End: 2019-02-12 | Stop reason: HOSPADM

## 2019-02-12 RX ORDER — SODIUM CHLORIDE 0.9 % (FLUSH) 0.9 %
10 SYRINGE (ML) INJECTION PRN
Status: DISCONTINUED | OUTPATIENT
Start: 2019-02-12 | End: 2019-02-12 | Stop reason: HOSPADM

## 2019-02-12 RX ORDER — MEPERIDINE HYDROCHLORIDE 25 MG/ML
12.5 INJECTION INTRAMUSCULAR; INTRAVENOUS; SUBCUTANEOUS EVERY 5 MIN PRN
Status: DISCONTINUED | OUTPATIENT
Start: 2019-02-12 | End: 2019-02-12 | Stop reason: HOSPADM

## 2019-02-12 RX ORDER — GLYCOPYRROLATE 0.2 MG/ML
INJECTION INTRAMUSCULAR; INTRAVENOUS PRN
Status: DISCONTINUED | OUTPATIENT
Start: 2019-02-12 | End: 2019-02-12 | Stop reason: SDUPTHER

## 2019-02-12 RX ADMIN — SODIUM CHLORIDE: 9 INJECTION, SOLUTION INTRAVENOUS at 17:31

## 2019-02-12 RX ADMIN — ROCURONIUM BROMIDE 10 MG: 10 INJECTION, SOLUTION INTRAVENOUS at 14:26

## 2019-02-12 RX ADMIN — HYDROMORPHONE HYDROCHLORIDE 0.6 MG: 2 INJECTION, SOLUTION INTRAMUSCULAR; INTRAVENOUS; SUBCUTANEOUS at 16:10

## 2019-02-12 RX ADMIN — LIDOCAINE HYDROCHLORIDE 100 MG: 20 INJECTION, SOLUTION INFILTRATION; PERINEURAL at 15:21

## 2019-02-12 RX ADMIN — PROPOFOL 150 MG: 10 INJECTION, EMULSION INTRAVENOUS at 14:26

## 2019-02-12 RX ADMIN — SODIUM CHLORIDE 1000 ML: 9 INJECTION, SOLUTION INTRAVENOUS at 22:22

## 2019-02-12 RX ADMIN — Medication 140 MG: at 14:26

## 2019-02-12 RX ADMIN — GLYCOPYRROLATE 0.8 MG: 0.2 INJECTION INTRAMUSCULAR; INTRAVENOUS at 16:08

## 2019-02-12 RX ADMIN — SODIUM CHLORIDE, SODIUM LACTATE, POTASSIUM CHLORIDE, AND CALCIUM CHLORIDE: 600; 310; 30; 20 INJECTION, SOLUTION INTRAVENOUS at 15:26

## 2019-02-12 RX ADMIN — ONDANSETRON 8 MG: 2 INJECTION INTRAMUSCULAR; INTRAVENOUS at 14:24

## 2019-02-12 RX ADMIN — PHENYLEPHRINE HYDROCHLORIDE 100 MCG: 10 INJECTION, SOLUTION INTRAMUSCULAR; INTRAVENOUS; SUBCUTANEOUS at 14:56

## 2019-02-12 RX ADMIN — APREPITANT 80 MG: 40 CAPSULE ORAL at 12:30

## 2019-02-12 RX ADMIN — SODIUM CHLORIDE, SODIUM LACTATE, POTASSIUM CHLORIDE, AND CALCIUM CHLORIDE: 600; 310; 30; 20 INJECTION, SOLUTION INTRAVENOUS at 14:07

## 2019-02-12 RX ADMIN — LIDOCAINE HYDROCHLORIDE 200 MG: 20 INJECTION, SOLUTION INFILTRATION; PERINEURAL at 14:26

## 2019-02-12 RX ADMIN — ONDANSETRON 4 MG: 2 INJECTION INTRAMUSCULAR; INTRAVENOUS at 23:43

## 2019-02-12 RX ADMIN — NEOSTIGMINE METHYLSULFATE 5 MG: 1 INJECTION INTRAMUSCULAR; INTRAVENOUS; SUBCUTANEOUS at 16:08

## 2019-02-12 RX ADMIN — HYDROMORPHONE HYDROCHLORIDE 0.4 MG: 2 INJECTION, SOLUTION INTRAMUSCULAR; INTRAVENOUS; SUBCUTANEOUS at 16:14

## 2019-02-12 RX ADMIN — Medication 10 ML: at 19:59

## 2019-02-12 RX ADMIN — ROCURONIUM BROMIDE 30 MG: 10 INJECTION, SOLUTION INTRAVENOUS at 14:36

## 2019-02-12 RX ADMIN — PROPOFOL 40 MG: 10 INJECTION, EMULSION INTRAVENOUS at 16:14

## 2019-02-12 RX ADMIN — HYDROMORPHONE HYDROCHLORIDE: 10 INJECTION INTRAMUSCULAR; INTRAVENOUS; SUBCUTANEOUS at 17:37

## 2019-02-12 RX ADMIN — ENOXAPARIN SODIUM 30 MG: 30 INJECTION SUBCUTANEOUS at 12:30

## 2019-02-12 RX ADMIN — FAMOTIDINE 20 MG: 10 INJECTION, SOLUTION INTRAVENOUS at 14:23

## 2019-02-12 RX ADMIN — HYDROMORPHONE HYDROCHLORIDE 1 MG: 2 INJECTION, SOLUTION INTRAMUSCULAR; INTRAVENOUS; SUBCUTANEOUS at 14:26

## 2019-02-12 RX ADMIN — DIPHENHYDRAMINE HYDROCHLORIDE 12.5 MG: 50 INJECTION, SOLUTION INTRAMUSCULAR; INTRAVENOUS at 14:23

## 2019-02-12 RX ADMIN — PANTOPRAZOLE SODIUM 40 MG: 40 INJECTION, POWDER, FOR SOLUTION INTRAVENOUS at 19:59

## 2019-02-12 ASSESSMENT — PULMONARY FUNCTION TESTS
PIF_VALUE: 31
PIF_VALUE: 27
PIF_VALUE: 29
PIF_VALUE: 20
PIF_VALUE: 28
PIF_VALUE: 29
PIF_VALUE: 1
PIF_VALUE: 30
PIF_VALUE: 30
PIF_VALUE: 31
PIF_VALUE: 30
PIF_VALUE: 32
PIF_VALUE: 32
PIF_VALUE: 30
PIF_VALUE: 32
PIF_VALUE: 27
PIF_VALUE: 21
PIF_VALUE: 25
PIF_VALUE: 29
PIF_VALUE: 31
PIF_VALUE: 31
PIF_VALUE: 30
PIF_VALUE: 21
PIF_VALUE: 30
PIF_VALUE: 31
PIF_VALUE: 32
PIF_VALUE: 29
PIF_VALUE: 17
PIF_VALUE: 22
PIF_VALUE: 32
PIF_VALUE: 28
PIF_VALUE: 29
PIF_VALUE: 29
PIF_VALUE: 23
PIF_VALUE: 23
PIF_VALUE: 31
PIF_VALUE: 28
PIF_VALUE: 17
PIF_VALUE: 25
PIF_VALUE: 22
PIF_VALUE: 31
PIF_VALUE: 20
PIF_VALUE: 31
PIF_VALUE: 32
PIF_VALUE: 28
PIF_VALUE: 31
PIF_VALUE: 29
PIF_VALUE: 20
PIF_VALUE: 29
PIF_VALUE: 30
PIF_VALUE: 17
PIF_VALUE: 31
PIF_VALUE: 31
PIF_VALUE: 3
PIF_VALUE: 31
PIF_VALUE: 29
PIF_VALUE: 30
PIF_VALUE: 0
PIF_VALUE: 32
PIF_VALUE: 30
PIF_VALUE: 31
PIF_VALUE: 7
PIF_VALUE: 31
PIF_VALUE: 20
PIF_VALUE: 17
PIF_VALUE: 32
PIF_VALUE: 32
PIF_VALUE: 31
PIF_VALUE: 29
PIF_VALUE: 32
PIF_VALUE: 31
PIF_VALUE: 32
PIF_VALUE: 31
PIF_VALUE: 31
PIF_VALUE: 30
PIF_VALUE: 29
PIF_VALUE: 17
PIF_VALUE: 32
PIF_VALUE: 22
PIF_VALUE: 22
PIF_VALUE: 31
PIF_VALUE: 17
PIF_VALUE: 31
PIF_VALUE: 17
PIF_VALUE: 31
PIF_VALUE: 22
PIF_VALUE: 29
PIF_VALUE: 1
PIF_VALUE: 32
PIF_VALUE: 21
PIF_VALUE: 31
PIF_VALUE: 30
PIF_VALUE: 20
PIF_VALUE: 17
PIF_VALUE: 31
PIF_VALUE: 1
PIF_VALUE: 7
PIF_VALUE: 29
PIF_VALUE: 30
PIF_VALUE: 0
PIF_VALUE: 20
PIF_VALUE: 31
PIF_VALUE: 31
PIF_VALUE: 29
PIF_VALUE: 17
PIF_VALUE: 22
PIF_VALUE: 30
PIF_VALUE: 29
PIF_VALUE: 0
PIF_VALUE: 29
PIF_VALUE: 31
PIF_VALUE: 17
PIF_VALUE: 29
PIF_VALUE: 32
PIF_VALUE: 31
PIF_VALUE: 31
PIF_VALUE: 25
PIF_VALUE: 31
PIF_VALUE: 25
PIF_VALUE: 31
PIF_VALUE: 30
PIF_VALUE: 29
PIF_VALUE: 21
PIF_VALUE: 12

## 2019-02-12 ASSESSMENT — PAIN SCALES - GENERAL
PAINLEVEL_OUTOF10: 0

## 2019-02-13 ENCOUNTER — APPOINTMENT (OUTPATIENT)
Dept: GENERAL RADIOLOGY | Age: 64
DRG: 164 | End: 2019-02-13
Attending: SURGERY
Payer: MEDICARE

## 2019-02-13 LAB
ANION GAP SERPL CALCULATED.3IONS-SCNC: 13 MMOL/L (ref 3–16)
BUN BLDV-MCNC: 15 MG/DL (ref 7–20)
CALCIUM SERPL-MCNC: 8.5 MG/DL (ref 8.3–10.6)
CHLORIDE BLD-SCNC: 102 MMOL/L (ref 99–110)
CO2: 25 MMOL/L (ref 21–32)
CREAT SERPL-MCNC: 0.9 MG/DL (ref 0.6–1.2)
GFR AFRICAN AMERICAN: >60
GFR NON-AFRICAN AMERICAN: >60
GLUCOSE BLD-MCNC: 106 MG/DL (ref 70–99)
HCT VFR BLD CALC: 36 % (ref 36–48)
HEMOGLOBIN: 12.1 G/DL (ref 12–16)
MCH RBC QN AUTO: 30.8 PG (ref 26–34)
MCHC RBC AUTO-ENTMCNC: 33.6 G/DL (ref 31–36)
MCV RBC AUTO: 91.7 FL (ref 80–100)
PDW BLD-RTO: 13.6 % (ref 12.4–15.4)
PLATELET # BLD: 278 K/UL (ref 135–450)
PMV BLD AUTO: 6.9 FL (ref 5–10.5)
POTASSIUM SERPL-SCNC: 5 MMOL/L (ref 3.5–5.1)
RBC # BLD: 3.92 M/UL (ref 4–5.2)
SODIUM BLD-SCNC: 140 MMOL/L (ref 136–145)
WBC # BLD: 9.6 K/UL (ref 4–11)

## 2019-02-13 PROCEDURE — 74241 FL UGI W KUB: CPT

## 2019-02-13 PROCEDURE — 94770 HC ETCO2 MONITOR DAILY: CPT

## 2019-02-13 PROCEDURE — 2580000003 HC RX 258: Performed by: NURSE PRACTITIONER

## 2019-02-13 PROCEDURE — 1200000000 HC SEMI PRIVATE

## 2019-02-13 PROCEDURE — 85027 COMPLETE CBC AUTOMATED: CPT

## 2019-02-13 PROCEDURE — 2700000000 HC OXYGEN THERAPY PER DAY

## 2019-02-13 PROCEDURE — 94660 CPAP INITIATION&MGMT: CPT

## 2019-02-13 PROCEDURE — 6360000002 HC RX W HCPCS: Performed by: NURSE PRACTITIONER

## 2019-02-13 PROCEDURE — 6360000002 HC RX W HCPCS: Performed by: SURGERY

## 2019-02-13 PROCEDURE — 36415 COLL VENOUS BLD VENIPUNCTURE: CPT

## 2019-02-13 PROCEDURE — 80048 BASIC METABOLIC PNL TOTAL CA: CPT

## 2019-02-13 PROCEDURE — 99024 POSTOP FOLLOW-UP VISIT: CPT | Performed by: SURGERY

## 2019-02-13 PROCEDURE — C9113 INJ PANTOPRAZOLE SODIUM, VIA: HCPCS | Performed by: SURGERY

## 2019-02-13 PROCEDURE — 2580000003 HC RX 258: Performed by: SURGERY

## 2019-02-13 PROCEDURE — 94761 N-INVAS EAR/PLS OXIMETRY MLT: CPT

## 2019-02-13 RX ORDER — 0.9 % SODIUM CHLORIDE 0.9 %
1000 INTRAVENOUS SOLUTION INTRAVENOUS ONCE
Status: COMPLETED | OUTPATIENT
Start: 2019-02-13 | End: 2019-02-13

## 2019-02-13 RX ORDER — 0.9 % SODIUM CHLORIDE 0.9 %
500 INTRAVENOUS SOLUTION INTRAVENOUS ONCE
Status: DISCONTINUED | OUTPATIENT
Start: 2019-02-13 | End: 2019-02-15 | Stop reason: HOSPADM

## 2019-02-13 RX ORDER — METOCLOPRAMIDE HYDROCHLORIDE 5 MG/ML
10 INJECTION INTRAMUSCULAR; INTRAVENOUS EVERY 6 HOURS SCHEDULED
Status: DISCONTINUED | OUTPATIENT
Start: 2019-02-13 | End: 2019-02-15 | Stop reason: HOSPADM

## 2019-02-13 RX ADMIN — SODIUM CHLORIDE: 9 INJECTION, SOLUTION INTRAVENOUS at 02:26

## 2019-02-13 RX ADMIN — ONDANSETRON 4 MG: 2 INJECTION INTRAMUSCULAR; INTRAVENOUS at 21:13

## 2019-02-13 RX ADMIN — Medication 10 ML: at 11:23

## 2019-02-13 RX ADMIN — METOCLOPRAMIDE 10 MG: 5 INJECTION, SOLUTION INTRAMUSCULAR; INTRAVENOUS at 19:40

## 2019-02-13 RX ADMIN — SODIUM CHLORIDE 1000 ML: 9 INJECTION, SOLUTION INTRAVENOUS at 07:27

## 2019-02-13 RX ADMIN — ENOXAPARIN SODIUM 30 MG: 30 INJECTION SUBCUTANEOUS at 21:09

## 2019-02-13 RX ADMIN — ENOXAPARIN SODIUM 30 MG: 30 INJECTION SUBCUTANEOUS at 02:26

## 2019-02-13 RX ADMIN — METOCLOPRAMIDE 10 MG: 5 INJECTION, SOLUTION INTRAMUSCULAR; INTRAVENOUS at 11:14

## 2019-02-13 RX ADMIN — ONDANSETRON 4 MG: 2 INJECTION INTRAMUSCULAR; INTRAVENOUS at 07:51

## 2019-02-13 RX ADMIN — PANTOPRAZOLE SODIUM 40 MG: 40 INJECTION, POWDER, FOR SOLUTION INTRAVENOUS at 11:13

## 2019-02-13 RX ADMIN — METOCLOPRAMIDE 10 MG: 5 INJECTION, SOLUTION INTRAMUSCULAR; INTRAVENOUS at 23:52

## 2019-02-13 ASSESSMENT — PAIN SCALES - GENERAL
PAINLEVEL_OUTOF10: 1
PAINLEVEL_OUTOF10: 3
PAINLEVEL_OUTOF10: 2

## 2019-02-14 ENCOUNTER — TELEPHONE (OUTPATIENT)
Dept: BARIATRICS/WEIGHT MGMT | Age: 64
End: 2019-02-14

## 2019-02-14 PROCEDURE — 1200000000 HC SEMI PRIVATE

## 2019-02-14 PROCEDURE — 2700000000 HC OXYGEN THERAPY PER DAY

## 2019-02-14 PROCEDURE — 6360000002 HC RX W HCPCS: Performed by: SURGERY

## 2019-02-14 PROCEDURE — C9113 INJ PANTOPRAZOLE SODIUM, VIA: HCPCS | Performed by: SURGERY

## 2019-02-14 PROCEDURE — 51798 US URINE CAPACITY MEASURE: CPT

## 2019-02-14 PROCEDURE — 6360000002 HC RX W HCPCS: Performed by: NURSE PRACTITIONER

## 2019-02-14 PROCEDURE — 6370000000 HC RX 637 (ALT 250 FOR IP): Performed by: NURSE PRACTITIONER

## 2019-02-14 PROCEDURE — 94770 HC ETCO2 MONITOR DAILY: CPT

## 2019-02-14 PROCEDURE — 2580000003 HC RX 258: Performed by: SURGERY

## 2019-02-14 PROCEDURE — 2580000003 HC RX 258: Performed by: NURSE PRACTITIONER

## 2019-02-14 PROCEDURE — 94761 N-INVAS EAR/PLS OXIMETRY MLT: CPT

## 2019-02-14 RX ORDER — SODIUM CHLORIDE, SODIUM LACTATE, POTASSIUM CHLORIDE, CALCIUM CHLORIDE 600; 310; 30; 20 MG/100ML; MG/100ML; MG/100ML; MG/100ML
INJECTION, SOLUTION INTRAVENOUS CONTINUOUS
Status: DISCONTINUED | OUTPATIENT
Start: 2019-02-14 | End: 2019-02-15 | Stop reason: HOSPADM

## 2019-02-14 RX ORDER — MECLIZINE HYDROCHLORIDE 25 MG/1
25 TABLET ORAL 3 TIMES DAILY PRN
Status: DISCONTINUED | OUTPATIENT
Start: 2019-02-14 | End: 2019-02-15 | Stop reason: HOSPADM

## 2019-02-14 RX ORDER — HYOSCYAMINE SULFATE 0.125 MG
125 TABLET ORAL 4 TIMES DAILY
Status: DISCONTINUED | OUTPATIENT
Start: 2019-02-14 | End: 2019-02-15 | Stop reason: HOSPADM

## 2019-02-14 RX ORDER — OXYCODONE HYDROCHLORIDE AND ACETAMINOPHEN 5; 325 MG/1; MG/1
1 TABLET ORAL EVERY 6 HOURS PRN
Qty: 28 TABLET | Refills: 0 | Status: SHIPPED | OUTPATIENT
Start: 2019-02-14 | End: 2019-02-21

## 2019-02-14 RX ORDER — HYOSCYAMINE SULFATE 0.125 MG
125 TABLET ORAL 4 TIMES DAILY
Qty: 20 TABLET | Refills: 0 | Status: SHIPPED | OUTPATIENT
Start: 2019-02-14 | End: 2019-03-27

## 2019-02-14 RX ORDER — METOCLOPRAMIDE 10 MG/1
5 TABLET ORAL 2 TIMES DAILY
Qty: 30 TABLET | Refills: 0 | Status: SHIPPED | OUTPATIENT
Start: 2019-02-14 | End: 2019-03-27

## 2019-02-14 RX ORDER — ONDANSETRON 4 MG/1
4 TABLET, ORALLY DISINTEGRATING ORAL EVERY 6 HOURS
Qty: 20 TABLET | Refills: 0 | Status: SHIPPED | OUTPATIENT
Start: 2019-02-14 | End: 2019-03-27

## 2019-02-14 RX ADMIN — METOCLOPRAMIDE 10 MG: 5 INJECTION, SOLUTION INTRAMUSCULAR; INTRAVENOUS at 06:02

## 2019-02-14 RX ADMIN — HYDROMORPHONE HYDROCHLORIDE 0.25 MG: 1 INJECTION, SOLUTION INTRAMUSCULAR; INTRAVENOUS; SUBCUTANEOUS at 11:53

## 2019-02-14 RX ADMIN — ONDANSETRON 4 MG: 2 INJECTION INTRAMUSCULAR; INTRAVENOUS at 08:05

## 2019-02-14 RX ADMIN — HYOSCYAMINE SULFATE 125 MCG: 0.12 TABLET ORAL at 08:09

## 2019-02-14 RX ADMIN — PANTOPRAZOLE SODIUM 40 MG: 40 INJECTION, POWDER, FOR SOLUTION INTRAVENOUS at 08:05

## 2019-02-14 RX ADMIN — Medication 10 ML: at 08:09

## 2019-02-14 RX ADMIN — PROCHLORPERAZINE EDISYLATE 10 MG: 5 INJECTION INTRAMUSCULAR; INTRAVENOUS at 10:55

## 2019-02-14 RX ADMIN — HYOSCYAMINE SULFATE 125 MCG: 0.12 TABLET ORAL at 16:26

## 2019-02-14 RX ADMIN — HYOSCYAMINE SULFATE 125 MCG: 0.12 TABLET ORAL at 12:57

## 2019-02-14 RX ADMIN — METOCLOPRAMIDE 10 MG: 5 INJECTION, SOLUTION INTRAMUSCULAR; INTRAVENOUS at 17:55

## 2019-02-14 RX ADMIN — ENOXAPARIN SODIUM 30 MG: 30 INJECTION SUBCUTANEOUS at 08:06

## 2019-02-14 RX ADMIN — ENOXAPARIN SODIUM 30 MG: 30 INJECTION SUBCUTANEOUS at 21:00

## 2019-02-14 RX ADMIN — HYOSCYAMINE SULFATE 125 MCG: 0.12 TABLET ORAL at 20:59

## 2019-02-14 RX ADMIN — SODIUM CHLORIDE, SODIUM LACTATE, POTASSIUM CHLORIDE, AND CALCIUM CHLORIDE: 600; 310; 30; 20 INJECTION, SOLUTION INTRAVENOUS at 23:22

## 2019-02-14 RX ADMIN — METOCLOPRAMIDE 10 MG: 5 INJECTION, SOLUTION INTRAMUSCULAR; INTRAVENOUS at 12:56

## 2019-02-14 RX ADMIN — SODIUM CHLORIDE, SODIUM LACTATE, POTASSIUM CHLORIDE, AND CALCIUM CHLORIDE: 600; 310; 30; 20 INJECTION, SOLUTION INTRAVENOUS at 16:25

## 2019-02-14 RX ADMIN — SODIUM CHLORIDE: 9 INJECTION, SOLUTION INTRAVENOUS at 02:58

## 2019-02-14 ASSESSMENT — PAIN SCALES - GENERAL
PAINLEVEL_OUTOF10: 4
PAINLEVEL_OUTOF10: 0
PAINLEVEL_OUTOF10: 1

## 2019-02-15 VITALS
HEIGHT: 63 IN | BODY MASS INDEX: 41.46 KG/M2 | OXYGEN SATURATION: 96 % | DIASTOLIC BLOOD PRESSURE: 80 MMHG | HEART RATE: 76 BPM | RESPIRATION RATE: 16 BRPM | WEIGHT: 234 LBS | TEMPERATURE: 98.7 F | SYSTOLIC BLOOD PRESSURE: 135 MMHG

## 2019-02-15 LAB
ALBUMIN SERPL-MCNC: 3.2 G/DL (ref 3.4–5)
ANION GAP SERPL CALCULATED.3IONS-SCNC: 12 MMOL/L (ref 3–16)
BUN BLDV-MCNC: 10 MG/DL (ref 7–20)
CALCIUM SERPL-MCNC: 9 MG/DL (ref 8.3–10.6)
CHLORIDE BLD-SCNC: 104 MMOL/L (ref 99–110)
CO2: 23 MMOL/L (ref 21–32)
CREAT SERPL-MCNC: 0.7 MG/DL (ref 0.6–1.2)
GFR AFRICAN AMERICAN: >60
GFR NON-AFRICAN AMERICAN: >60
GLUCOSE BLD-MCNC: 74 MG/DL (ref 70–99)
HCT VFR BLD CALC: 34.3 % (ref 36–48)
HEMOGLOBIN: 11.2 G/DL (ref 12–16)
MAGNESIUM: 2 MG/DL (ref 1.8–2.4)
MCH RBC QN AUTO: 30.5 PG (ref 26–34)
MCHC RBC AUTO-ENTMCNC: 32.8 G/DL (ref 31–36)
MCV RBC AUTO: 93 FL (ref 80–100)
PDW BLD-RTO: 13.6 % (ref 12.4–15.4)
PHOSPHORUS: 2 MG/DL (ref 2.5–4.9)
PLATELET # BLD: 238 K/UL (ref 135–450)
PMV BLD AUTO: 7.1 FL (ref 5–10.5)
POTASSIUM SERPL-SCNC: 4 MMOL/L (ref 3.5–5.1)
RBC # BLD: 3.69 M/UL (ref 4–5.2)
SODIUM BLD-SCNC: 139 MMOL/L (ref 136–145)
WBC # BLD: 7.3 K/UL (ref 4–11)

## 2019-02-15 PROCEDURE — 6360000002 HC RX W HCPCS: Performed by: SURGERY

## 2019-02-15 PROCEDURE — C9113 INJ PANTOPRAZOLE SODIUM, VIA: HCPCS | Performed by: SURGERY

## 2019-02-15 PROCEDURE — 85027 COMPLETE CBC AUTOMATED: CPT

## 2019-02-15 PROCEDURE — 6360000002 HC RX W HCPCS: Performed by: NURSE PRACTITIONER

## 2019-02-15 PROCEDURE — 2580000003 HC RX 258: Performed by: SURGERY

## 2019-02-15 PROCEDURE — 2580000003 HC RX 258: Performed by: NURSE PRACTITIONER

## 2019-02-15 PROCEDURE — 36415 COLL VENOUS BLD VENIPUNCTURE: CPT

## 2019-02-15 PROCEDURE — 6370000000 HC RX 637 (ALT 250 FOR IP): Performed by: NURSE PRACTITIONER

## 2019-02-15 PROCEDURE — 83735 ASSAY OF MAGNESIUM: CPT

## 2019-02-15 PROCEDURE — 94660 CPAP INITIATION&MGMT: CPT

## 2019-02-15 PROCEDURE — 80069 RENAL FUNCTION PANEL: CPT

## 2019-02-15 PROCEDURE — 94761 N-INVAS EAR/PLS OXIMETRY MLT: CPT

## 2019-02-15 RX ADMIN — HYOSCYAMINE SULFATE 125 MCG: 0.12 TABLET ORAL at 09:00

## 2019-02-15 RX ADMIN — HYOSCYAMINE SULFATE 125 MCG: 0.12 TABLET ORAL at 11:23

## 2019-02-15 RX ADMIN — SODIUM CHLORIDE, SODIUM LACTATE, POTASSIUM CHLORIDE, AND CALCIUM CHLORIDE: 600; 310; 30; 20 INJECTION, SOLUTION INTRAVENOUS at 05:35

## 2019-02-15 RX ADMIN — PANTOPRAZOLE SODIUM 40 MG: 40 INJECTION, POWDER, FOR SOLUTION INTRAVENOUS at 08:59

## 2019-02-15 RX ADMIN — Medication 10 ML: at 08:59

## 2019-02-15 RX ADMIN — METOCLOPRAMIDE 10 MG: 5 INJECTION, SOLUTION INTRAMUSCULAR; INTRAVENOUS at 00:00

## 2019-02-15 RX ADMIN — ENOXAPARIN SODIUM 30 MG: 30 INJECTION SUBCUTANEOUS at 08:59

## 2019-02-15 RX ADMIN — METOCLOPRAMIDE 10 MG: 5 INJECTION, SOLUTION INTRAMUSCULAR; INTRAVENOUS at 11:23

## 2019-02-15 RX ADMIN — METOCLOPRAMIDE 10 MG: 5 INJECTION, SOLUTION INTRAMUSCULAR; INTRAVENOUS at 05:37

## 2019-02-18 ENCOUNTER — TELEPHONE (OUTPATIENT)
Dept: BARIATRICS/WEIGHT MGMT | Age: 64
End: 2019-02-18

## 2019-02-18 DIAGNOSIS — Z98.84 S/P LAPAROSCOPIC SLEEVE GASTRECTOMY: Primary | ICD-10-CM

## 2019-02-19 ENCOUNTER — TELEPHONE (OUTPATIENT)
Dept: BARIATRICS/WEIGHT MGMT | Age: 64
End: 2019-02-19

## 2019-02-27 ENCOUNTER — OFFICE VISIT (OUTPATIENT)
Dept: BARIATRICS/WEIGHT MGMT | Age: 64
End: 2019-02-27

## 2019-02-27 VITALS
HEIGHT: 63 IN | SYSTOLIC BLOOD PRESSURE: 128 MMHG | WEIGHT: 230 LBS | BODY MASS INDEX: 40.75 KG/M2 | HEART RATE: 82 BPM | DIASTOLIC BLOOD PRESSURE: 72 MMHG

## 2019-02-27 DIAGNOSIS — Z98.84 S/P LAPAROSCOPIC SLEEVE GASTRECTOMY: Primary | ICD-10-CM

## 2019-02-27 PROCEDURE — 99024 POSTOP FOLLOW-UP VISIT: CPT | Performed by: SURGERY

## 2019-03-07 ENCOUNTER — OFFICE VISIT (OUTPATIENT)
Dept: CARDIOLOGY CLINIC | Age: 64
End: 2019-03-07
Payer: MEDICARE

## 2019-03-07 VITALS
SYSTOLIC BLOOD PRESSURE: 98 MMHG | BODY MASS INDEX: 39.43 KG/M2 | DIASTOLIC BLOOD PRESSURE: 70 MMHG | HEART RATE: 70 BPM | WEIGHT: 222.6 LBS

## 2019-03-07 DIAGNOSIS — I10 ESSENTIAL HYPERTENSION: Primary | ICD-10-CM

## 2019-03-07 PROCEDURE — 1090F PRES/ABSN URINE INCON ASSESS: CPT | Performed by: INTERNAL MEDICINE

## 2019-03-07 PROCEDURE — 4040F PNEUMOC VAC/ADMIN/RCVD: CPT | Performed by: INTERNAL MEDICINE

## 2019-03-07 PROCEDURE — 99213 OFFICE O/P EST LOW 20 MIN: CPT | Performed by: INTERNAL MEDICINE

## 2019-03-07 PROCEDURE — 3017F COLORECTAL CA SCREEN DOC REV: CPT | Performed by: INTERNAL MEDICINE

## 2019-03-07 PROCEDURE — 1123F ACP DISCUSS/DSCN MKR DOCD: CPT | Performed by: INTERNAL MEDICINE

## 2019-03-07 PROCEDURE — 1101F PT FALLS ASSESS-DOCD LE1/YR: CPT | Performed by: INTERNAL MEDICINE

## 2019-03-07 PROCEDURE — 1111F DSCHRG MED/CURRENT MED MERGE: CPT | Performed by: INTERNAL MEDICINE

## 2019-03-07 PROCEDURE — G8427 DOCREV CUR MEDS BY ELIG CLIN: HCPCS | Performed by: INTERNAL MEDICINE

## 2019-03-07 PROCEDURE — 1036F TOBACCO NON-USER: CPT | Performed by: INTERNAL MEDICINE

## 2019-03-07 PROCEDURE — G8484 FLU IMMUNIZE NO ADMIN: HCPCS | Performed by: INTERNAL MEDICINE

## 2019-03-07 PROCEDURE — G8417 CALC BMI ABV UP PARAM F/U: HCPCS | Performed by: INTERNAL MEDICINE

## 2019-03-07 PROCEDURE — G8400 PT W/DXA NO RESULTS DOC: HCPCS | Performed by: INTERNAL MEDICINE

## 2019-03-07 ASSESSMENT — ENCOUNTER SYMPTOMS
EYES NEGATIVE: 1
CHOKING: 0
GASTROINTESTINAL NEGATIVE: 1
APNEA: 0
CHEST TIGHTNESS: 0
ALLERGIC/IMMUNOLOGIC NEGATIVE: 1
SHORTNESS OF BREATH: 0
COUGH: 0
STRIDOR: 0

## 2019-03-27 ENCOUNTER — OFFICE VISIT (OUTPATIENT)
Dept: BARIATRICS/WEIGHT MGMT | Age: 64
End: 2019-03-27

## 2019-03-27 VITALS
DIASTOLIC BLOOD PRESSURE: 80 MMHG | HEART RATE: 68 BPM | HEIGHT: 63 IN | BODY MASS INDEX: 38.95 KG/M2 | WEIGHT: 219.8 LBS | SYSTOLIC BLOOD PRESSURE: 120 MMHG

## 2019-03-27 DIAGNOSIS — Z98.84 S/P LAPAROSCOPIC SLEEVE GASTRECTOMY: Primary | ICD-10-CM

## 2019-03-27 PROCEDURE — 99024 POSTOP FOLLOW-UP VISIT: CPT | Performed by: SURGERY

## 2019-05-08 ENCOUNTER — OFFICE VISIT (OUTPATIENT)
Dept: BARIATRICS/WEIGHT MGMT | Age: 64
End: 2019-05-08

## 2019-05-08 VITALS
BODY MASS INDEX: 36.86 KG/M2 | HEIGHT: 63 IN | DIASTOLIC BLOOD PRESSURE: 60 MMHG | HEART RATE: 72 BPM | SYSTOLIC BLOOD PRESSURE: 110 MMHG | WEIGHT: 208 LBS

## 2019-05-08 DIAGNOSIS — Z98.84 S/P LAPAROSCOPIC SLEEVE GASTRECTOMY: Primary | ICD-10-CM

## 2019-05-08 PROCEDURE — 99024 POSTOP FOLLOW-UP VISIT: CPT | Performed by: SURGERY

## 2019-05-08 NOTE — PROGRESS NOTES
Patient doing well  No N/V/F/C  Tolerating diet   Having regular BM's     12 weeks S/P lap sleeve gastrectomy    Down 12# over last 6 weeks  84# total weight loss     F/U in 3 months    Flaquita Oconnor

## 2019-05-20 ENCOUNTER — OFFICE VISIT (OUTPATIENT)
Dept: PULMONOLOGY | Age: 64
End: 2019-05-20
Payer: COMMERCIAL

## 2019-05-20 VITALS
SYSTOLIC BLOOD PRESSURE: 108 MMHG | HEIGHT: 63 IN | BODY MASS INDEX: 37.03 KG/M2 | DIASTOLIC BLOOD PRESSURE: 60 MMHG | OXYGEN SATURATION: 98 % | WEIGHT: 209 LBS | HEART RATE: 68 BPM

## 2019-05-20 DIAGNOSIS — G47.33 OBSTRUCTIVE SLEEP APNEA (ADULT) (PEDIATRIC): Primary | ICD-10-CM

## 2019-05-20 DIAGNOSIS — I10 ESSENTIAL HYPERTENSION: ICD-10-CM

## 2019-05-20 DIAGNOSIS — E66.2 CLASS 2 OBESITY WITH ALVEOLAR HYPOVENTILATION WITHOUT SERIOUS COMORBIDITY WITH BODY MASS INDEX (BMI) OF 37.0 TO 37.9 IN ADULT (HCC): ICD-10-CM

## 2019-05-20 PROBLEM — E66.01 MORBID OBESITY WITH BMI OF 40.0-44.9, ADULT (HCC): Status: RESOLVED | Noted: 2019-01-17 | Resolved: 2019-05-20

## 2019-05-20 PROCEDURE — 99213 OFFICE O/P EST LOW 20 MIN: CPT | Performed by: NURSE PRACTITIONER

## 2019-05-20 ASSESSMENT — ENCOUNTER SYMPTOMS
ABDOMINAL PAIN: 0
SINUS PRESSURE: 0
APNEA: 0
COUGH: 0
ABDOMINAL DISTENTION: 0
RHINORRHEA: 0
SHORTNESS OF BREATH: 0
EYE PAIN: 0
EYE REDNESS: 0

## 2019-05-20 ASSESSMENT — SLEEP AND FATIGUE QUESTIONNAIRES
HOW LIKELY ARE YOU TO NOD OFF OR FALL ASLEEP WHILE SITTING QUIETLY AFTER LUNCH WITHOUT ALCOHOL: 0
HOW LIKELY ARE YOU TO NOD OFF OR FALL ASLEEP WHILE WATCHING TV: 1
HOW LIKELY ARE YOU TO NOD OFF OR FALL ASLEEP WHEN YOU ARE A PASSENGER IN A CAR FOR AN HOUR WITHOUT A BREAK: 0
HOW LIKELY ARE YOU TO NOD OFF OR FALL ASLEEP IN A CAR, WHILE STOPPED FOR A FEW MINUTES IN TRAFFIC: 0
HOW LIKELY ARE YOU TO NOD OFF OR FALL ASLEEP WHILE SITTING AND TALKING TO SOMEONE: 0
ESS TOTAL SCORE: 2
HOW LIKELY ARE YOU TO NOD OFF OR FALL ASLEEP WHILE SITTING INACTIVE IN A PUBLIC PLACE: 0
HOW LIKELY ARE YOU TO NOD OFF OR FALL ASLEEP WHILE SITTING AND READING: 0
HOW LIKELY ARE YOU TO NOD OFF OR FALL ASLEEP WHILE LYING DOWN TO REST IN THE AFTERNOON WHEN CIRCUMSTANCES PERMIT: 1

## 2019-05-20 NOTE — PROGRESS NOTES
Lilibeth Roy         : 1955    Diagnosis: [x] TERRANCE (G47.33) [] CSA (G47.31) [] Apnea (G47.30)   Length of Need: [x] 12 Months [] 99 Months [] Other:    Machine (COLLIN!): [x] Respironics Dream Station      Auto [] ResMed AirSense     Auto [] Other:     []  CPAP () [] Bilevel ()   Mode: [] Auto [] Spontaneous    Mode: [] Auto [] Spontaneous                            Comfort Settings:   - Ramp Pressure:  cmH2O                                        - Ramp time: 15 min                                     -  Flex/EPR - 3 full time                                    - For ResMed Bilevel (TiMax-4 sec   TiMin- 0.2 sec)     Humidifier: [x] Heated ()        [x] Water chamber replacement ()/ 1 per 6 months        Mask:   [x] Nasal () /1 per 3 months [] Full Face () /1 per 3 months   [x] Patient choice -Size and fit mask [] Patient Choice - Size and fit mask   [] Dispense:  [] Dispense:    [x] Headgear () / 1 per 3 months [] Headgear () / 1 per 3 months   [x] Replacement Nasal Cushion ()/2 per month [] Interface Replacement ()/1 per month   [] Replacement Nasal Pillows ()/2 per month         Tubing: [x] Heated ()/1 per 3 months    [] Standard ()/1 per 3 months [] Other:           Filters: [x] Non-disposable ()/1 per 6 months     [x] Ultra-Fine, Disposable ()/2 per month        Miscellaneous: [] Chin Strap ()/ 1 per 6 months [] O2 bleed-in:       LPM   [] Oximetry on CPAP/Bilevel []  Other:          Start Order Date: 19    MEDICAL JUSTIFICATION:  I, the undersigned, certify that the above prescribed supplies are medically necessary for this patients wellbeing. In my opinion, the supplies are both reasonable and necessary in reference to accepted standards of medicalpractice in treatment of this patients condition.     MERNA Marsh - CNP      NPI: [id number]       Order Signed Date: 19    Electronically signed by

## 2019-05-20 NOTE — LETTER
[facility] Sleep Medicine  [address] [place] [address] Drive  Phone: [phone]  Fax: [phone]    May 20, 2019       Patient: Regino Swanson   MR Number: [id number]   YOB: 1955   Date of Visit: 5/20/2019       Regino Swanson was seen for a follow up visit today. Here is my assessment and plan as well as an attached copy of her visit today:    Class 2 obesity with alveolar hypoventilation without serious comorbidity with body mass index (BMI) of 37.0 to 37.9 in adult ([facility] [address]  Chronic-Stable. Encouraged her to work on weight loss through diet and exercise. Essential hypertension  Chronic- Stable. Cont meds per PCP and other physicians. Obstructive sleep apnea (adult) (pediatric)  Reviewed compliance download with pt. Supplies and parts as needed for her machine. These are medically necessary. Continue medications per her PCP and other physicians. Limit caffeine use after 3pm.  Encouraged her to work on weight loss through diet and exercise. Diagnoses of Class 2 obesity with alveolar hypoventilation without serious comorbidity with body mass index (BMI) of 37.0 to 37.9 in adult [facility]) and Essential hypertension were pertinent to this visit. The chronic medical conditions listed are directly related to the primary diagnosis listed above. The management of the primary diagnosis affects the secondary diagnosis and vice versa. If you have questions or concerns, please do not hesitate to call me. I look forward to following Jeanna Yusuf along with you.     Sincerely,    MERNA Calzada - CNP    CC providers:  Behzad Buckley MD  [address] [place] [address]  [place]

## 2019-05-20 NOTE — PROGRESS NOTES
Nannette Kellogg MD, FAASM, [facility]  Shima Hung, MSN, RN, CNP     [address] [place] [address] SLEEP MEDICINE  [address] [address]  Dept: [phone]  Dept Fax: : Deanne [phone] [facility] 18 MEDICINE  [address] [address] [phone]    Subjective:     Patient ID: Andreia Reed is a 71 y.o. female. Chief Complaint   Patient presents with    Sleep Apnea       HPI:      Machine Present today:  Yes    Machine Modem/Download Info:  Compliance (hours/night): 5.75 hrs/night  Download AHI (/hour): 0.1 /HR  Average CPAP Pressure : 9.3 cmH2O           APAP - Settings  Pressure Min: 7 cmH2O  Pressure Max: 17 cmH2O                 Comfort Settings  Humidity Level (0-8): 4  Heated Tubing (Yes/No): No  Flex/EPR (0-3): 3 PAP Mask  Mask Type: Nasal mask  Mask Model: Wisp  Mask Size: sm     [place] - Total score: 2    Follow-up :     Last Visit : January 2018      Patient reports the listed chronic Co-morbidities: Obesity, HTN   are well controlled and stable at this time. Subjective Health Changes: Weight loss surgery 2/2019    Over Night Oximetry: [x] Yes  [] No  [] NA Will call for results    Using O2: [] Yes  [x] No  [] NA   Patient is compliant with the machine  [x] Yes  [] No   Feeling rested when using the machine   [x] Yes  [] No     Pressure is comfortable with inspiration and expiration  [x] Yes  [] No     Noticed changes in pressure   [] Yes  [] No  [x] NA   Mask is fitting well  [x] Yes  [] No   Noting Mask Air Leak  [] Yes  [x] No   Having painful Aerophagia  [] Yes  [x] No   Nocturia   0  per night.    Having  HA upon waking  [] Yes  [x] No   Dry mouth upon waking   Dry Nose  Dry Eyes  [x] Yes  [] No   Congestion upon waking   [x] Yes  [] No    Nose Bleeds  [] Yes  [x] No   Using Sleep Aides    [x] NA   Understands how to change humidification and/or tubing temperature for comfort while at home  [x] Yes  [] No     Difficulties falling asleep  [] Yes  [x] No   Difficulties staying asleep  [] Yes  [x] No   Approximate time to bed  11pm-12am   Approximate wake time  4am   Taking Naps  no   If taking naps usual length    [x] NA   If taking naps using the machine  [] Yes  [] No  [x] NA   Drowsy when driving  [] Yes  [x] No     Does patient carry a DOT/CDL  [] Yes  [x] No     Does patient carry FAA/Pilots License   [] Yes  [x] No      Any concerns noted with the machine at this time  [] Yes  [x] No        Diagnosis Orders   1. Obstructive sleep apnea (adult) (pediatric)     2. Class 2 obesity with alveolar hypoventilation without serious comorbidity with body mass index (BMI) of 37.0 to 37.9 in adult ([facility]     3. Essential hypertension         The chronic medical conditions listed are directly related to the primary diagnosis listed above. The management of the primary diagnosis affects the secondary diagnosis and vice versa. Review of Systems   Constitutional: Negative for appetite change, chills, fatigue and fever. HENT: Negative for congestion, nosebleeds, rhinorrhea and sinus pressure. Eyes: Negative for pain and redness. Respiratory: Negative for apnea, cough and shortness of breath. Cardiovascular: Negative for chest pain and palpitations. Gastrointestinal: Negative for abdominal distention and abdominal pain. Neurological: Negative for dizziness and headaches. Psychiatric/Behavioral: Negative for sleep disturbance.        Social History     Socioeconomic History    Marital status:      Spouse name: Not on file    Number of children: Not on file    Years of education: Not on file    Highest education level: Not on file   Occupational History    Not on file   Social Needs    Financial resource strain: Not on file    Food insecurity:     Worry: Not on file     Inability: Not on file    Transportation needs:     Medical: Not on file Non-medical: Not on file   Tobacco Use    Smoking status: Never Smoker    Smokeless tobacco: Never Used   Substance and Sexual Activity    Alcohol use: No     Alcohol/week: 0.0 oz    Drug use: No    Sexual activity: Not on file   Lifestyle    Physical activity:     Days per week: Not on file     Minutes per session: Not on file    Stress: Not on file   Relationships    Social connections:     Talks on phone: Not on file     Gets together: Not on file     Attends Taoism service: Not on file     Active member of club or organization: Not on file     Attends meetings of clubs or organizations: Not on file     Relationship status: Not on file    Intimate partner violence:     Fear of current or ex partner: Not on file     Emotionally abused: Not on file     Physically abused: Not on file     Forced sexual activity: Not on file   Other Topics Concern    Not on file   Social History Narrative    Not on file       Prior to Admission medications    Medication Sig Start Date End Date Taking? Authorizing Provider   Multiple Vitamins-Minerals (BARIATRIC FUSION PO) Take 4 tablets by mouth daily   Yes Historical Provider, MD   losartan-hydrochlorothiazide (HYZAAR) 50-12.5 MG per tablet Take 1 tablet by mouth daily 1/31/19  Yes Bethany Bradford MD   Compression Stockings MISC by Does not apply route 3 pair. Put on in am and off at bed time.  20-30 mm 12/14/18  Yes Bethany Bradford MD       Allergies as of 05/20/2019    (No Known Allergies)       Patient Active Problem List   Diagnosis    Chronic pain of left knee    Left ankle swelling    Class 2 obesity with alveolar hypoventilation without serious comorbidity with body mass index (BMI) of 37.0 to 37.9 in adult ([facility]    Fatigue    Fatty liver disease, nonalcoholic    Essential hypertension    Hiatal hernia with GERD    Idiopathic esophageal varices without bleeding (HCC)    Gastric polyp    Obstructive sleep apnea (adult) (pediatric)       Past Medical History:   Diagnosis Date    Arthritis     Back pain     Hypertension     Obesity     Sleep apnea        Past Surgical History:   Procedure Laterality Date    COLONOSCOPY  8/17/2018    COLONOSCOPY POLYPECTOMY SNARE/COLD BIOPSY TRANSVERSE POLYP performed by Black Almaraz MD at [address] N/A 2/12/2019    ROBOTIC ASSISTED LAPAROSCOPIC SLEEVE GASTRECTOMY, LAPAROSCOPIC HIATAL HERNIA REPAIR performed by uJani Garcia DO at [facility]      UPPER GASTROINTESTINAL ENDOSCOPY N/A 8/17/2018    EGD BIOPSY GASTRIC ANTRUM BX R/O HP BX/COLD SNARE GASATRIC POLYP performed by Juani Garcia DO at [address] [place] [address] N/A 8/17/2018    EGD POLYP SNARE performed by Juani Garcia DO at [facility] ENDOSCOPY       Family History   Problem Relation Age of Onset    High Blood Pressure Mother     Anxiety Disorder Mother     Arthritis Mother        Vitals:  Weight BMI   Wt Readings from Last 3 Encounters:   05/20/19 209 lb (94.8 kg)   05/08/19 208 lb (94.3 kg)   03/27/19 219 lb 12.8 oz (99.7 kg)    Body mass index is 37.02 kg/m². BP HR SaO2   BP Readings from Last 3 Encounters:   05/20/19 108/60   05/08/19 110/60   03/27/19 120/80    Pulse Readings from Last 3 Encounters:   05/20/19 68   05/08/19 72   03/27/19 68    SpO2 Readings from Last 3 Encounters:   05/20/19 98%   02/15/19 96%   02/12/19 97%        Assessment/Plan:     Class 2 obesity with alveolar hypoventilation without serious comorbidity with body mass index (BMI) of 37.0 to 37.9 in adult (HCC)  Chronic-Stable. Encouraged her to work on weight loss through diet and exercise. Essential hypertension  Chronic- Stable. Cont meds per PCP and other physicians. Obstructive sleep apnea (adult) (pediatric)  Reviewed compliance download with pt. Supplies and parts as needed for her machine. These are medically necessary. Continue medications per her PCP and other physicians.  Limit caffeine use after 3pm.  Encouraged her to work on weight loss through diet and exercise. Diagnoses of Class 2 obesity with alveolar hypoventilation without serious comorbidity with body mass index (BMI) of 37.0 to 37.9 in adult [facility]) and Essential hypertension were pertinent to this visit. The chronic medical conditions listed are directly related to the primary diagnosis listed above. The management of the primary diagnosis affects the secondary diagnosis and vice versa. The primary encounter diagnosis was Obstructive sleep apnea (adult) (pediatric). Diagnoses of Class 2 obesity with alveolar hypoventilation without serious comorbidity with body mass index (BMI) of 37.0 to 37.9 in adult [facility]) and Essential hypertension were also pertinent to this visit. The chronic medical conditions listed are directly related to the primary diagnosis listed above. The management of the primary diagnosis affects the secondary diagnosis and vice versa. - Educated patient and reviewed compliance download with pt.    -Supplies and parts as needed for her machine, these are medically necessary.    - Patient using [employer] for supplies  -Continue medications per her PCP and other physicians.   -Limit caffeine use after 3pm.    -Encouraged her to work on weight loss through diet and exercise. -F/U: 12 month. No orders of the defined types were placed in this encounter. No orders of the defined types were placed in this encounter. No orders of the defined types were placed in this encounter.       Eulalia Braden, MSN, RN, CNP

## 2019-05-29 RX ORDER — OMEPRAZOLE 40 MG/1
CAPSULE, DELAYED RELEASE ORAL
Qty: 30 CAPSULE | Refills: 4 | Status: SHIPPED | OUTPATIENT
Start: 2019-05-29 | End: 2019-10-28 | Stop reason: SDUPTHER

## 2019-07-29 ENCOUNTER — OFFICE VISIT (OUTPATIENT)
Dept: FAMILY MEDICINE CLINIC | Age: 64
End: 2019-07-29
Payer: COMMERCIAL

## 2019-07-29 VITALS
HEART RATE: 76 BPM | RESPIRATION RATE: 16 BRPM | OXYGEN SATURATION: 99 % | WEIGHT: 206.8 LBS | BODY MASS INDEX: 36.64 KG/M2 | SYSTOLIC BLOOD PRESSURE: 104 MMHG | DIASTOLIC BLOOD PRESSURE: 70 MMHG | HEIGHT: 63 IN

## 2019-07-29 DIAGNOSIS — K76.0 FATTY LIVER DISEASE, NONALCOHOLIC: ICD-10-CM

## 2019-07-29 DIAGNOSIS — E78.2 MIXED HYPERLIPIDEMIA: ICD-10-CM

## 2019-07-29 DIAGNOSIS — E66.2 CLASS 2 OBESITY WITH ALVEOLAR HYPOVENTILATION WITHOUT SERIOUS COMORBIDITY WITH BODY MASS INDEX (BMI) OF 37.0 TO 37.9 IN ADULT (HCC): ICD-10-CM

## 2019-07-29 DIAGNOSIS — G47.33 OBSTRUCTIVE SLEEP APNEA (ADULT) (PEDIATRIC): ICD-10-CM

## 2019-07-29 DIAGNOSIS — I85.00 IDIOPATHIC ESOPHAGEAL VARICES WITHOUT BLEEDING (HCC): ICD-10-CM

## 2019-07-29 DIAGNOSIS — I10 ESSENTIAL HYPERTENSION: Primary | ICD-10-CM

## 2019-07-29 LAB
ALBUMIN SERPL-MCNC: 4.1 G/DL (ref 3.4–5)
ALP BLD-CCNC: 109 U/L (ref 40–129)
ALT SERPL-CCNC: 19 U/L (ref 10–40)
ANION GAP SERPL CALCULATED.3IONS-SCNC: 14 MMOL/L (ref 3–16)
AST SERPL-CCNC: 25 U/L (ref 15–37)
BASOPHILS ABSOLUTE: 0 K/UL (ref 0–0.2)
BASOPHILS RELATIVE PERCENT: 0.6 %
BILIRUB SERPL-MCNC: 0.4 MG/DL (ref 0–1)
BILIRUBIN DIRECT: <0.2 MG/DL (ref 0–0.3)
BILIRUBIN, INDIRECT: NORMAL MG/DL (ref 0–1)
BUN BLDV-MCNC: 23 MG/DL (ref 7–20)
CALCIUM SERPL-MCNC: 9.9 MG/DL (ref 8.3–10.6)
CHLORIDE BLD-SCNC: 106 MMOL/L (ref 99–110)
CHOLESTEROL, TOTAL: 164 MG/DL (ref 0–199)
CO2: 25 MMOL/L (ref 21–32)
CREAT SERPL-MCNC: 0.7 MG/DL (ref 0.6–1.2)
EOSINOPHILS ABSOLUTE: 0.2 K/UL (ref 0–0.6)
EOSINOPHILS RELATIVE PERCENT: 3.9 %
GFR AFRICAN AMERICAN: >60
GFR NON-AFRICAN AMERICAN: >60
GLUCOSE BLD-MCNC: 101 MG/DL (ref 70–99)
HCT VFR BLD CALC: 38.1 % (ref 36–48)
HDLC SERPL-MCNC: 51 MG/DL (ref 40–60)
HEMOGLOBIN: 12.9 G/DL (ref 12–16)
LDL CHOLESTEROL CALCULATED: 100 MG/DL
LYMPHOCYTES ABSOLUTE: 1.4 K/UL (ref 1–5.1)
LYMPHOCYTES RELATIVE PERCENT: 31.2 %
MCH RBC QN AUTO: 32.2 PG (ref 26–34)
MCHC RBC AUTO-ENTMCNC: 33.8 G/DL (ref 31–36)
MCV RBC AUTO: 95.4 FL (ref 80–100)
MONOCYTES ABSOLUTE: 0.4 K/UL (ref 0–1.3)
MONOCYTES RELATIVE PERCENT: 9.3 %
NEUTROPHILS ABSOLUTE: 2.5 K/UL (ref 1.7–7.7)
NEUTROPHILS RELATIVE PERCENT: 55 %
PDW BLD-RTO: 14.3 % (ref 12.4–15.4)
PLATELET # BLD: 218 K/UL (ref 135–450)
PMV BLD AUTO: 7.9 FL (ref 5–10.5)
POTASSIUM SERPL-SCNC: 4 MMOL/L (ref 3.5–5.1)
RBC # BLD: 4 M/UL (ref 4–5.2)
SODIUM BLD-SCNC: 145 MMOL/L (ref 136–145)
TOTAL PROTEIN: 7 G/DL (ref 6.4–8.2)
TRIGL SERPL-MCNC: 65 MG/DL (ref 0–150)
VLDLC SERPL CALC-MCNC: 13 MG/DL
WBC # BLD: 4.5 K/UL (ref 4–11)

## 2019-07-29 PROCEDURE — 36415 COLL VENOUS BLD VENIPUNCTURE: CPT | Performed by: INTERNAL MEDICINE

## 2019-07-29 PROCEDURE — 99214 OFFICE O/P EST MOD 30 MIN: CPT | Performed by: INTERNAL MEDICINE

## 2019-07-29 RX ORDER — LOSARTAN POTASSIUM AND HYDROCHLOROTHIAZIDE 12.5; 5 MG/1; MG/1
1 TABLET ORAL DAILY
Qty: 90 TABLET | Refills: 3 | Status: SHIPPED | OUTPATIENT
Start: 2019-07-29 | End: 2021-10-14 | Stop reason: SDUPTHER

## 2019-07-29 ASSESSMENT — PATIENT HEALTH QUESTIONNAIRE - PHQ9
SUM OF ALL RESPONSES TO PHQ QUESTIONS 1-9: 0
SUM OF ALL RESPONSES TO PHQ QUESTIONS 1-9: 0
1. LITTLE INTEREST OR PLEASURE IN DOING THINGS: 0
SUM OF ALL RESPONSES TO PHQ9 QUESTIONS 1 & 2: 0
2. FEELING DOWN, DEPRESSED OR HOPELESS: 0

## 2019-07-29 ASSESSMENT — ENCOUNTER SYMPTOMS
GASTROINTESTINAL NEGATIVE: 1
ALLERGIC/IMMUNOLOGIC NEGATIVE: 1
RESPIRATORY NEGATIVE: 1

## 2019-07-29 NOTE — ASSESSMENT & PLAN NOTE
No recent change in meds, no c/o with meds, no chest pain, SOB, palpatations, or syncope. Home bp was 100-110/80s. . Lost 30 lbs in 8 mo w/ HCTZ and diet.

## 2019-07-30 ENCOUNTER — TELEPHONE (OUTPATIENT)
Dept: FAMILY MEDICINE CLINIC | Age: 64
End: 2019-07-30

## 2019-10-28 RX ORDER — OMEPRAZOLE 40 MG/1
CAPSULE, DELAYED RELEASE ORAL
Qty: 30 CAPSULE | Refills: 3 | Status: SHIPPED | OUTPATIENT
Start: 2019-10-28 | End: 2020-02-28

## 2020-02-28 RX ORDER — OMEPRAZOLE 40 MG/1
CAPSULE, DELAYED RELEASE ORAL
Qty: 30 CAPSULE | Refills: 2 | Status: SHIPPED | OUTPATIENT
Start: 2020-02-28 | End: 2021-10-14 | Stop reason: ALTCHOICE

## 2020-05-18 ENCOUNTER — TELEPHONE (OUTPATIENT)
Dept: PULMONOLOGY | Age: 65
End: 2020-05-18

## 2020-06-01 ENCOUNTER — VIRTUAL VISIT (OUTPATIENT)
Dept: PULMONOLOGY | Age: 65
End: 2020-06-01
Payer: COMMERCIAL

## 2020-06-01 PROCEDURE — 99214 OFFICE O/P EST MOD 30 MIN: CPT | Performed by: NURSE PRACTITIONER

## 2020-06-01 ASSESSMENT — SLEEP AND FATIGUE QUESTIONNAIRES
HOW LIKELY ARE YOU TO NOD OFF OR FALL ASLEEP WHILE WATCHING TV: 1
HOW LIKELY ARE YOU TO NOD OFF OR FALL ASLEEP WHILE SITTING AND READING: 0
HOW LIKELY ARE YOU TO NOD OFF OR FALL ASLEEP WHILE SITTING INACTIVE IN A PUBLIC PLACE: 0
ESS TOTAL SCORE: 2
HOW LIKELY ARE YOU TO NOD OFF OR FALL ASLEEP WHILE SITTING QUIETLY AFTER LUNCH WITHOUT ALCOHOL: 0
HOW LIKELY ARE YOU TO NOD OFF OR FALL ASLEEP IN A CAR, WHILE STOPPED FOR A FEW MINUTES IN TRAFFIC: 0
HOW LIKELY ARE YOU TO NOD OFF OR FALL ASLEEP WHILE SITTING AND TALKING TO SOMEONE: 0
HOW LIKELY ARE YOU TO NOD OFF OR FALL ASLEEP WHEN YOU ARE A PASSENGER IN A CAR FOR AN HOUR WITHOUT A BREAK: 0
HOW LIKELY ARE YOU TO NOD OFF OR FALL ASLEEP WHILE LYING DOWN TO REST IN THE AFTERNOON WHEN CIRCUMSTANCES PERMIT: 1

## 2020-06-01 NOTE — LETTER
[facility] Sleep Medicine  [address] [facility] [address]  Phone: [phone]  Fax: [phone]    June 1, 2020       Patient: Thi Valadez   MR Number: [id number]   YOB: 1955   Date of Visit: 6/1/2020       Thi Valadez was seen for a follow up visit today. Here is my assessment and plan as well as an attached copy of her visit today:    Essential hypertension  Chronic- Stable. Cont meds per PCP and other physicians. Class 2 obesity with alveolar hypoventilation without serious comorbidity with body mass index (BMI) of 37.0 to 37.9 in adult ([facility])  Chronic-Stable. Encouraged her to work on weight loss through diet and exercise. Obstructive sleep apnea (adult) (pediatric)  Reviewed compliance download with pt. Supplies and parts as needed for her machine. These are medically necessary. Continue medications per her PCP and other physicians. Limit caffeine use after 3pm.  Encouraged her to work on weight loss through diet and exercise. Diagnoses of Essential hypertension and Class 2 obesity with alveolar hypoventilation without serious comorbidity with body mass index (BMI) of 37.0 to 37.9 in adult [facility]) were pertinent to this visit. The chronic medical conditions listed are directly related to the primary diagnosis listed above. The management of the primary diagnosis affects the secondary diagnosis and vice versa. If you have questions or concerns, please do not hesitate to call me. I look forward to following Jose Francisco Clemens along with you.     Sincerely,    Stacey Schilling, MERNA - CNP    CC providers:  MD Arvind  [address] [place] [address]

## 2020-06-01 NOTE — PROGRESS NOTES
El[facility] Daniel MD, FAASM, [facility]  Halima Arreola, MSN, RN, CNP     [address] [facility] SLEEP MEDICINE  [address] [facility] [address]  Dept: [phone]  Dept Fax: : Deanne Hernandez [facility] SLEEP MEDICINE  [address] [place] [address] [phone]    Subjective:     Patient ID: Gwen Colmenares is a 76 y.o. female. Chief Complaint   Patient presents with    Sleep Apnea       HPI:      Sleep Medicine Video Visit    Pursuant to the emergency declaration under the [address] [place] [address], [facility] waiver authority and the [employer] Act this Telephone Visit was insisted, with patient's consent, to reduce the patient's risk of exposure to COVID-19 and provide continuity of care for an established patient. Services were provided through a synchronous discussion over a telephone and/or Video chat to substitute for in-person clinic visit, and coded as such. Machine Modem/Download Info:  Compliance (hours/night): 6.25 hrs/night  Download AHI (/hour): 0.2 /HR  Average CPAP Pressure : 8.6 cmH2O           APAP - Settings  Pressure Min: 7 cmH2O  Pressure Max: 17 cmH2O                 Comfort Settings  Humidity Level (0-8): 5  Flex/EPR (0-3): 3 PAP Mask  Mask Type: Nasal mask     [place] - Total score: 2    Follow-up :     Last Visit : May 2019      Patient reports the listed chronic Co-morbidities: HTN, Obesity,    are well controlled and stable at this time.      Subjective Health Changes: None      Over Night Oximetry: [] Yes  [] No  [x] NA [] WNL   Using O2: [] Yes  [] No  [x] NA   Patient is compliant with the machine  [x] Yes  [] No   Feeling rested when using the machine   [x] Yes  [] No     Pressure is comfortable with inspiration and expiration  [x] Yes  [] No     Noticed changes in pressure   [] Yes  [] No  [x]

## 2020-06-01 NOTE — PROGRESS NOTES
Jacob Boland         : 1955    Diagnosis: [x] TERRANCE (G47.33) [] CSA (G47.31) [] Apnea (G47.30)   Length of Need: [x] 12 Months [] 99 Months [] Other:    Machine (COLLIN!): [x] Respironics Dream Station      Auto [] ResMed AirSense     Auto [] Other:     []  CPAP () [] Bilevel ()   Mode: [] Auto [] Spontaneous    Mode: [] Auto [] Spontaneous                            Comfort Settings:   - Ramp Pressure:  cmH2O                                        - Ramp time: 15 min                                     -  Flex/EPR - 3 full time                                    - For ResMed Bilevel (TiMax-4 sec   TiMin- 0.2 sec)     Humidifier: [x] Heated ()        [x] Water chamber replacement ()/ 1 per 6 months        Mask:   [x] Nasal () /1 per 3 months [] Full Face () /1 per 3 months   [x] Patient choice -Size and fit mask [] Patient Choice - Size and fit mask   [] Dispense:  [] Dispense:    [x] Headgear () / 1 per 3 months [] Headgear () / 1 per 3 months   [x] Replacement Nasal Cushion ()/2 per month [] Interface Replacement ()/1 per month   [] Replacement Nasal Pillows ()/2 per month         Tubing: [x] Heated ()/1 per 3 months    [] Standard ()/1 per 3 months [] Other:           Filters: [x] Non-disposable ()/1 per 6 months     [x] Ultra-Fine, Disposable ()/2 per month        Miscellaneous: [] Chin Strap ()/ 1 per 6 months [] O2 bleed-in:       LPM   [] Oximetry on CPAP/Bilevel []  Other:    [x] Modem: ()         Start Order Date: 20    MEDICAL JUSTIFICATION:  I, the undersigned, certify that the above prescribed supplies are medically necessary for this patients wellbeing. In my opinion, the supplies are both reasonable and necessary in reference to accepted standards of medicalpractice in treatment of this patients condition.     MERNA Mills - CNP      NPI: [id number]       Order Signed Date:

## 2020-10-29 NOTE — PROGRESS NOTES
Juan Hess         : 1955- [facility]    Diagnosis: [x] TERRANCE (G47.33) [] CSA (G47.31) [] Apnea (G47.30)   Length of Need: [x] 8 Months [] 99 Months [] Other:    Machine (COLLIN!): [x] Respironics Dream Station      Auto [] ResMed AirSense     Auto [] Other:     [x]  CPAP () [] Bilevel ()   Mode: [x] Auto [] Spontaneous    Mode: [] Auto [] Spontaneous      Pressure Min: 7  Pressure Max: 17                 Comfort Settings:   - Ramp Pressure: 4 cmH2O                                        - Ramp time: 15 min                                     -  Flex/EPR - 3 full time                                    - For ResMed Bilevel (TiMax-4 sec   TiMin- 0.2 sec)     Humidifier: [x] Heated ()        [x] Water chamber replacement ()/ 1 per 6 months        Mask:   [x] Nasal () /1 per 3 months [] Full Face () /1 per 3 months   [x] Patient choice -Size and fit mask [] Patient Choice - Size and fit mask   [] Dispense:  [] Dispense:    [x] Headgear () / 1 per 3 months [] Headgear () / 1 per 3 months   [x] Replacement Nasal Cushion ()/2 per month [] Interface Replacement ()/1 per month   [] Replacement Nasal Pillows ()/2 per month         Tubing: [x] Heated ()/1 per 3 months    [] Standard ()/1 per 3 months [] Other:           Filters: [x] Non-disposable ()/1 per 6 months     [x] Ultra-Fine, Disposable ()/2 per month        Miscellaneous: [] Chin Strap ()/ 1 per 6 months [] O2 bleed-in:       LPM   [] Oximetry on CPAP/Bilevel []  Other:    [x] Modem: ()         Start Order Date: 10/29/20    MEDICAL JUSTIFICATION:  I, the undersigned, certify that the above prescribed supplies are medically necessary for this patients wellbeing. In my opinion, the supplies are both reasonable and necessary in reference to accepted standards of medicalpractice in treatment of this patients condition.     MERNA Martinez - GRIFFIN      NPI: [id number] Order Signed Date: 10/29/20    Electronically signed by MERNA Gutierrez CNP on 10/29/2020 at 9:20 AM

## 2020-10-30 ENCOUNTER — TELEPHONE (OUTPATIENT)
Dept: PULMONOLOGY | Age: 65
End: 2020-10-30

## 2020-10-30 NOTE — TELEPHONE ENCOUNTER
Per Julien Mcnally @ [facility], order needs sent to another DME Co. She was pick upped in 2019 in system

## 2020-10-30 NOTE — PROGRESS NOTES
Lesvia Reno         : 1955 [address] [facility]    Diagnosis: [x] TERRANCE (G47.33) [] CSA (G47.31) [] Apnea (G47.30)   Length of Need: [x] 7 Months [] 99 Months [] Other:    Machine (COLLIN!): [] Respironics Dream Station      Auto [] ResMed AirSense     Auto [] Other:     []  CPAP () [] Bilevel ()   Mode: [] Auto [] Spontaneous    Mode: [] Auto [] Spontaneous                            Comfort Settings:   - Ramp Pressure:  cmH2O                                        - Ramp time: 15 min                                     -  Flex/EPR - 3 full time                                    - For ResMed Bilevel (TiMax-4 sec   TiMin- 0.2 sec)     Humidifier: [x] Heated ()        [x] Water chamber replacement ()/ 1 per 6 months        Mask:   [x] Nasal () /1 per 3 months [] Full Face () /1 per 3 months   [x] Patient choice -Size and fit mask [] Patient Choice - Size and fit mask   [x] Dispense: Wisp am [] Dispense:    [] Headgear () / 1 per 3 months [] Headgear () / 1 per 3 months   [x] Replacement Nasal Cushion ()/2 per month [] Interface Replacement ()/1 per month   [] Replacement Nasal Pillows ()/2 per month         Tubing: [x] Heated ()/1 per 3 months    [] Standard ()/1 per 3 months [] Other:           Filters: [x] Non-disposable ()/1 per 6 months     [x] Ultra-Fine, Disposable ()/2 per month        Miscellaneous: [] Chin Strap ()/ 1 per 6 months [] O2 bleed-in:       LPM   [] Oximetry on CPAP/Bilevel []  Other:    [x] Modem: ()         Start Order Date: 10/30/20    MEDICAL JUSTIFICATION:  I, the undersigned, certify that the above prescribed supplies are medically necessary for this patients wellbeing. In my opinion, the supplies are both reasonable and necessary in reference to accepted standards of medicalpractice in treatment of this patients condition.     MERNA Wood - GRIFFIN      NPI: [id number]       Order Signed Date: 10/30/20    Electronically signed by MERNA Neff CNP on 10/30/2020 at 10:36 AM

## 2021-06-07 ENCOUNTER — VIRTUAL VISIT (OUTPATIENT)
Dept: PULMONOLOGY | Age: 66
End: 2021-06-07
Payer: COMMERCIAL

## 2021-06-07 DIAGNOSIS — E66.2 CLASS 2 OBESITY WITH ALVEOLAR HYPOVENTILATION WITHOUT SERIOUS COMORBIDITY WITH BODY MASS INDEX (BMI) OF 37.0 TO 37.9 IN ADULT (HCC): ICD-10-CM

## 2021-06-07 DIAGNOSIS — G47.33 OBSTRUCTIVE SLEEP APNEA (ADULT) (PEDIATRIC): Primary | ICD-10-CM

## 2021-06-07 DIAGNOSIS — I10 ESSENTIAL HYPERTENSION: ICD-10-CM

## 2021-06-07 PROCEDURE — 99213 OFFICE O/P EST LOW 20 MIN: CPT | Performed by: NURSE PRACTITIONER

## 2021-06-07 ASSESSMENT — SLEEP AND FATIGUE QUESTIONNAIRES
HOW LIKELY ARE YOU TO NOD OFF OR FALL ASLEEP WHILE LYING DOWN TO REST IN THE AFTERNOON WHEN CIRCUMSTANCES PERMIT: 1
HOW LIKELY ARE YOU TO NOD OFF OR FALL ASLEEP WHILE SITTING AND TALKING TO SOMEONE: 0
HOW LIKELY ARE YOU TO NOD OFF OR FALL ASLEEP WHILE SITTING QUIETLY AFTER LUNCH WITHOUT ALCOHOL: 0
HOW LIKELY ARE YOU TO NOD OFF OR FALL ASLEEP IN A CAR, WHILE STOPPED FOR A FEW MINUTES IN TRAFFIC: 0
HOW LIKELY ARE YOU TO NOD OFF OR FALL ASLEEP WHILE WATCHING TV: 1
ESS TOTAL SCORE: 3
HOW LIKELY ARE YOU TO NOD OFF OR FALL ASLEEP WHEN YOU ARE A PASSENGER IN A CAR FOR AN HOUR WITHOUT A BREAK: 0
HOW LIKELY ARE YOU TO NOD OFF OR FALL ASLEEP WHILE SITTING AND READING: 1
HOW LIKELY ARE YOU TO NOD OFF OR FALL ASLEEP WHILE SITTING INACTIVE IN A PUBLIC PLACE: 0

## 2021-06-07 NOTE — PROGRESS NOTES
Jourdan Smith         : 1955    Diagnosis: [x] TERRANCE (G47.33) [] CSA (G47.31) [] Apnea (G47.30)   Length of Need: [x] 12 Months [] 99 Months [] Other:    Machine (COLLIN!): [x] Respironics Dream Station   2   Auto [] ResMed AirSense     Auto [] Other:     []  CPAP () [] Bilevel ()   Mode: [] Auto [] Spontaneous    Mode: [] Auto [] Spontaneous                            Comfort Settings:   - Ramp Pressure:  cmH2O                                        - Ramp time: 15 min                                     -  Flex/EPR - 3 full time                                    - For ResMed Bilevel (TiMax-4 sec   TiMin- 0.2 sec)     Humidifier: [x] Heated ()        [x] Water chamber replacement ()/ 1 per 6 months        Mask:   [x] Nasal () /1 per 3 months [] Full Face () /1 per 3 months   [x] Patient choice -Size and fit mask [] Patient Choice - Size and fit mask   [] Dispense:  [] Dispense:    [x] Headgear () / 1 per 3 months [] Headgear () / 1 per 3 months   [x] Replacement Nasal Cushion ()/2 per month [] Interface Replacement ()/1 per month   [] Replacement Nasal Pillows ()/2 per month         Tubing: [x] Heated ()/1 per 3 months    [] Standard ()/1 per 3 months [] Other:           Filters: [x] Non-disposable ()/1 per 6 months     [x] Ultra-Fine, Disposable ()/2 per month        Miscellaneous: [] Chin Strap ()/ 1 per 6 months [] O2 bleed-in:       LPM   [] Oximetry on CPAP/Bilevel []  Other:    [x] Modem: ()         Start Order Date: 21    MEDICAL JUSTIFICATION:  I, the undersigned, certify that the above prescribed supplies are medically necessary for this patients wellbeing. In my opinion, the supplies are both reasonable and necessary in reference to accepted standards of medicalpractice in treatment of this patients condition.     Harlan Budd, APRN - CNP      NPI: [id number]       Order Signed Date: 21    Electronically signed by MERNA Lewis CNP on 2021 at 9:50 AM    Fozia Lambert  1955  [address] [place] [address] [address] [facility]  [phone] (home)   [phone] (mobile)      Insurance Info (confirm with patient if correct):  Payor/Plan Subscr  Sex Relation Sub.  Ins. ID Effective Group Num

## 2021-06-07 NOTE — PROGRESS NOTES
Having painful Aerophagia  [] Yes  [x] No   Nocturia   0  per night. Having  HA upon waking  [] Yes  [x] No   Dry mouth upon waking   Dry Nose  Dry Eyes  [x] Yes  [] No   Congestion upon waking   [] Yes  [x] No    Nose Bleeds  [] Yes  [x] No   Using Sleep Aides  [x] NA  [] OTC  [] Per our office   [] Per another provider   Understands how to change humidification and/or tubing temperature for comfort while at home  [x] Yes  [] No     Difficulties falling asleep  [] Yes  [x] No   Difficulties staying asleep  [] Yes  [x] No   Approximate time to bed  1am   Approximate wake time  7am   Taking Naps  occasional   If taking naps usual length  30-60 min     [] NA   If taking naps using the machine [] NA  [] Yes    [x] No    [] With and With out    Drowsy when driving  [] Yes  [x] No     Does patient carry a DOT/CDL  [] Yes  [x] No     Does patient carry FAA/Pilots License   [] Yes  [x] No      Any concerns noted with the machine at this time  [] Yes  [x] No       Assessment/Plan:   1. Obstructive sleep apnea (adult) (pediatric)  Assessment & Plan:  After downloading data and reviewing  Reviewed compliance download with pt. Supplies and parts as needed for his machine. These are medically necessary. Continue medications per his PCP and other physicians. Limit caffeine use after 3pm.    The chronic medical conditions listed are directly related to the primary diagnosis listed above. The management of the primary diagnosis affects the secondary diagnosis and vice versa    Patient is complaint encouraged to maintain compliance to aide on controlling other stated healthcare concerns. 2. Essential hypertension  Assessment & Plan:  Chronic- stable. After speaking with patient:    Agree with current plan, and would agree to continue this plan per prescribing and managing physician.      3. Class 2 obesity with alveolar hypoventilation without serious comorbidity with body mass index (BMI) of 37.0 to 37.9 in adult [facility])  Assessment & Plan:  Patient encouraged to work on maintaining a healthy weight per height. Achievable with diet restriction/modifications and exercise (may consult primary care if unsure of any restrictions or concerns). Weight management directly correlates to risk of control and maintenance of Obstructive Sleep Apnea. - After pulling data and reviewing it   - Reviewed compliance download with patient    -Medically necessary supplies and parts as needed for her machine.   - Continue medications per his primary care provider and other physicians.   - Encouraged to limit caffeine use after 3pm.    - Encouraged her to work on weight loss through diet and exercise  - Educated not to drive when feeling sleepy   - Patient using [employer]  - Napping with the machine  Office locations  Compliance  Follow up  After speaking to the patient, patient is currently stable. We will continue with the current machine settings    The chronic medical conditions listed are directly related to the primary diagnosis listed above. The management of the primary diagnosis affects the secondary diagnosis and vice versa.     - Will follow up in off in 12 months    Electronically signed by  ISAIAH Haynes, RN, CNP on 6/7/2021 at 9:57 AM

## 2021-07-26 ENCOUNTER — TELEPHONE (OUTPATIENT)
Dept: BARIATRICS/WEIGHT MGMT | Age: 66
End: 2021-07-26

## 2021-08-18 NOTE — ASSESSMENT & PLAN NOTE
Raynette Soulier in march now pain and swelling L knee. recently stable. The  entire leg is still swelling. Pain when first get up is worse. Problem bending leg. Worse going up/down stairs. About same since last visit.
Recent change in meds(added HCTZ), no c/o with meds, no chest pain, SOB, palpatations, or syncope. Home bp was not taken. Gained 50 lbs over last yr. Lost 9 lbs in 1 mo w/ HCTZ.
rt arm pain

## 2021-09-17 ENCOUNTER — TELEPHONE (OUTPATIENT)
Dept: PULMONOLOGY | Age: 66
End: 2021-09-17

## 2021-09-17 NOTE — TELEPHONE ENCOUNTER
Patient called about recall. Patient was asked to register machine by calling [phone]. Patient was instructed to continue using machine and a message will be sent to their provider about their individual plan of care. Patient was instructed not to store machine where it is exposed to extreme heat, cold, or direct sunlight, not to travel with it in the trunk of their car, not to use any after-market  like the So Clean or Ozone . Patient was instructed to quit using the machine and call the office if they notice any particles in the tubing while cleaning, any unusual odors coming from the machine or have any new onset of symptoms like cough or headache. Patient was informed that we will call them back if the provider has any further instructions or wants them to stop using their machine, otherwise, continue using machine.     [phone]

## 2021-09-30 ENCOUNTER — APPOINTMENT (OUTPATIENT)
Dept: GENERAL RADIOLOGY | Age: 66
DRG: 167 | End: 2021-09-30
Attending: INTERNAL MEDICINE
Payer: COMMERCIAL

## 2021-09-30 ENCOUNTER — APPOINTMENT (OUTPATIENT)
Dept: CT IMAGING | Age: 66
DRG: 167 | End: 2021-09-30
Attending: INTERNAL MEDICINE
Payer: COMMERCIAL

## 2021-09-30 ENCOUNTER — APPOINTMENT (OUTPATIENT)
Dept: INTERVENTIONAL RADIOLOGY/VASCULAR | Age: 66
DRG: 167 | End: 2021-09-30
Attending: INTERNAL MEDICINE
Payer: COMMERCIAL

## 2021-09-30 ENCOUNTER — HOSPITAL ENCOUNTER (INPATIENT)
Dept: CARDIAC CATH/INVASIVE PROCEDURES | Age: 66
LOS: 5 days | Discharge: HOME HEALTH | DRG: 167 | End: 2021-10-05
Attending: INTERNAL MEDICINE | Admitting: INTERNAL MEDICINE
Payer: COMMERCIAL

## 2021-09-30 PROBLEM — R07.9 CHEST PAIN: Status: ACTIVE | Noted: 2021-09-30

## 2021-09-30 PROBLEM — R09.02 HYPOXIA: Status: ACTIVE | Noted: 2021-09-30

## 2021-09-30 PROBLEM — R94.31 ABNORMAL EKG: Status: ACTIVE | Noted: 2021-09-30

## 2021-09-30 PROBLEM — R07.9 CHEST PAIN OF UNCERTAIN ETIOLOGY: Status: ACTIVE | Noted: 2021-09-30

## 2021-09-30 LAB
ANION GAP SERPL CALCULATED.3IONS-SCNC: 18 MMOL/L (ref 3–16)
ANION GAP SERPL CALCULATED.3IONS-SCNC: 18 MMOL/L (ref 3–16)
APTT: 29.4 SEC (ref 26.2–38.6)
APTT: 65.3 SEC (ref 26.2–38.6)
BASOPHILS ABSOLUTE: 0 K/UL (ref 0–0.2)
BASOPHILS ABSOLUTE: 0 K/UL (ref 0–0.2)
BASOPHILS RELATIVE PERCENT: 0.2 %
BASOPHILS RELATIVE PERCENT: 0.3 %
BUN BLDV-MCNC: 33 MG/DL (ref 7–20)
BUN BLDV-MCNC: 34 MG/DL (ref 7–20)
CALCIUM SERPL-MCNC: 8.6 MG/DL (ref 8.3–10.6)
CALCIUM SERPL-MCNC: 8.6 MG/DL (ref 8.3–10.6)
CHLORIDE BLD-SCNC: 100 MMOL/L (ref 99–110)
CHLORIDE BLD-SCNC: 102 MMOL/L (ref 99–110)
CO2: 21 MMOL/L (ref 21–32)
CO2: 24 MMOL/L (ref 21–32)
CREAT SERPL-MCNC: 1 MG/DL (ref 0.6–1.2)
CREAT SERPL-MCNC: 1.1 MG/DL (ref 0.6–1.2)
EOSINOPHILS ABSOLUTE: 0 K/UL (ref 0–0.6)
EOSINOPHILS ABSOLUTE: 0 K/UL (ref 0–0.6)
EOSINOPHILS RELATIVE PERCENT: 0.2 %
EOSINOPHILS RELATIVE PERCENT: 0.3 %
FIBRINOGEN: 135 MG/DL (ref 200–397)
FIBRINOGEN: 194 MG/DL (ref 200–397)
GFR AFRICAN AMERICAN: 60
GFR AFRICAN AMERICAN: 60
GFR AFRICAN AMERICAN: >60
GFR NON-AFRICAN AMERICAN: 49
GFR NON-AFRICAN AMERICAN: 49
GFR NON-AFRICAN AMERICAN: 55
GLUCOSE BLD-MCNC: 121 MG/DL (ref 70–99)
GLUCOSE BLD-MCNC: 93 MG/DL (ref 70–99)
HCT VFR BLD CALC: 42.1 % (ref 36–48)
HCT VFR BLD CALC: 42.4 % (ref 36–48)
HCT VFR BLD CALC: 43.4 % (ref 36–48)
HEMOGLOBIN: 13.8 G/DL (ref 12–16)
HEMOGLOBIN: 14.2 G/DL (ref 12–16)
HEMOGLOBIN: 14.4 G/DL (ref 12–16)
LACTIC ACID: 3.1 MMOL/L (ref 0.4–2)
LACTIC ACID: 3.3 MMOL/L (ref 0.4–2)
LV EF: 55 %
LYMPHOCYTES ABSOLUTE: 1.2 K/UL (ref 1–5.1)
LYMPHOCYTES ABSOLUTE: 1.8 K/UL (ref 1–5.1)
LYMPHOCYTES RELATIVE PERCENT: 12.5 %
LYMPHOCYTES RELATIVE PERCENT: 16.9 %
MAGNESIUM: 2.8 MG/DL (ref 1.8–2.4)
MCH RBC QN AUTO: 31.5 PG (ref 26–34)
MCH RBC QN AUTO: 31.7 PG (ref 26–34)
MCH RBC QN AUTO: 31.7 PG (ref 26–34)
MCHC RBC AUTO-ENTMCNC: 32.8 G/DL (ref 31–36)
MCHC RBC AUTO-ENTMCNC: 33.2 G/DL (ref 31–36)
MCHC RBC AUTO-ENTMCNC: 33.6 G/DL (ref 31–36)
MCV RBC AUTO: 94.3 FL (ref 80–100)
MCV RBC AUTO: 95.6 FL (ref 80–100)
MCV RBC AUTO: 96 FL (ref 80–100)
MONOCYTES ABSOLUTE: 0.8 K/UL (ref 0–1.3)
MONOCYTES ABSOLUTE: 0.8 K/UL (ref 0–1.3)
MONOCYTES RELATIVE PERCENT: 7.4 %
MONOCYTES RELATIVE PERCENT: 7.8 %
NEUTROPHILS ABSOLUTE: 7.6 K/UL (ref 1.7–7.7)
NEUTROPHILS ABSOLUTE: 8.2 K/UL (ref 1.7–7.7)
NEUTROPHILS RELATIVE PERCENT: 75.1 %
NEUTROPHILS RELATIVE PERCENT: 79.3 %
PDW BLD-RTO: 13.5 % (ref 12.4–15.4)
PDW BLD-RTO: 13.7 % (ref 12.4–15.4)
PDW BLD-RTO: 13.7 % (ref 12.4–15.4)
PERFORMED ON: ABNORMAL
PLATELET # BLD: 115 K/UL (ref 135–450)
PLATELET # BLD: 141 K/UL (ref 135–450)
PLATELET # BLD: 142 K/UL (ref 135–450)
PMV BLD AUTO: 8.7 FL (ref 5–10.5)
POC CREATININE: 1.1 MG/DL (ref 0.6–1.2)
POC SAMPLE TYPE: ABNORMAL
POTASSIUM SERPL-SCNC: 2.8 MMOL/L (ref 3.5–5.1)
POTASSIUM SERPL-SCNC: 3.5 MMOL/L (ref 3.5–5.1)
PRO-BNP: 5150 PG/ML (ref 0–124)
PRO-BNP: 5354 PG/ML (ref 0–124)
RBC # BLD: 4.39 M/UL (ref 4–5.2)
RBC # BLD: 4.49 M/UL (ref 4–5.2)
RBC # BLD: 4.54 M/UL (ref 4–5.2)
SODIUM BLD-SCNC: 139 MMOL/L (ref 136–145)
SODIUM BLD-SCNC: 144 MMOL/L (ref 136–145)
TROPONIN: 0.02 NG/ML
TROPONIN: 0.06 NG/ML
WBC # BLD: 11 K/UL (ref 4–11)
WBC # BLD: 16.3 K/UL (ref 4–11)
WBC # BLD: 9.6 K/UL (ref 4–11)

## 2021-09-30 PROCEDURE — 6360000004 HC RX CONTRAST MEDICATION: Performed by: INTERNAL MEDICINE

## 2021-09-30 PROCEDURE — U0003 INFECTIOUS AGENT DETECTION BY NUCLEIC ACID (DNA OR RNA); SEVERE ACUTE RESPIRATORY SYNDROME CORONAVIRUS 2 (SARS-COV-2) (CORONAVIRUS DISEASE [COVID-19]), AMPLIFIED PROBE TECHNIQUE, MAKING USE OF HIGH THROUGHPUT TECHNOLOGIES AS DESCRIBED BY CMS-2020-01-R: HCPCS

## 2021-09-30 PROCEDURE — 2500000003 HC RX 250 WO HCPCS

## 2021-09-30 PROCEDURE — 83735 ASSAY OF MAGNESIUM: CPT

## 2021-09-30 PROCEDURE — 82565 ASSAY OF CREATININE: CPT

## 2021-09-30 PROCEDURE — 37211 THROMBOLYTIC ART THERAPY: CPT | Performed by: RADIOLOGY

## 2021-09-30 PROCEDURE — 6360000002 HC RX W HCPCS

## 2021-09-30 PROCEDURE — 85730 THROMBOPLASTIN TIME PARTIAL: CPT

## 2021-09-30 PROCEDURE — 36415 COLL VENOUS BLD VENIPUNCTURE: CPT

## 2021-09-30 PROCEDURE — 2580000003 HC RX 258: Performed by: STUDENT IN AN ORGANIZED HEALTH CARE EDUCATION/TRAINING PROGRAM

## 2021-09-30 PROCEDURE — 4A023N7 MEASUREMENT OF CARDIAC SAMPLING AND PRESSURE, LEFT HEART, PERCUTANEOUS APPROACH: ICD-10-PCS | Performed by: INTERNAL MEDICINE

## 2021-09-30 PROCEDURE — 75743 ARTERY X-RAYS LUNGS: CPT | Performed by: RADIOLOGY

## 2021-09-30 PROCEDURE — 80053 COMPREHEN METABOLIC PANEL: CPT

## 2021-09-30 PROCEDURE — 6360000002 HC RX W HCPCS: Performed by: INTERNAL MEDICINE

## 2021-09-30 PROCEDURE — 71260 CT THORAX DX C+: CPT

## 2021-09-30 PROCEDURE — 85384 FIBRINOGEN ACTIVITY: CPT

## 2021-09-30 PROCEDURE — 99223 1ST HOSP IP/OBS HIGH 75: CPT | Performed by: INTERNAL MEDICINE

## 2021-09-30 PROCEDURE — 85025 COMPLETE CBC W/AUTO DIFF WBC: CPT

## 2021-09-30 PROCEDURE — 93458 L HRT ARTERY/VENTRICLE ANGIO: CPT | Performed by: INTERNAL MEDICINE

## 2021-09-30 PROCEDURE — 2580000003 HC RX 258

## 2021-09-30 PROCEDURE — 84484 ASSAY OF TROPONIN QUANT: CPT

## 2021-09-30 PROCEDURE — 83880 ASSAY OF NATRIURETIC PEPTIDE: CPT

## 2021-09-30 PROCEDURE — B2111ZZ FLUOROSCOPY OF MULTIPLE CORONARY ARTERIES USING LOW OSMOLAR CONTRAST: ICD-10-PCS | Performed by: INTERNAL MEDICINE

## 2021-09-30 PROCEDURE — 3E06317 INTRODUCTION OF OTHER THROMBOLYTIC INTO CENTRAL ARTERY, PERCUTANEOUS APPROACH: ICD-10-PCS | Performed by: RADIOLOGY

## 2021-09-30 PROCEDURE — 93005 ELECTROCARDIOGRAM TRACING: CPT

## 2021-09-30 PROCEDURE — 36014 PLACE CATHETER IN ARTERY: CPT | Performed by: RADIOLOGY

## 2021-09-30 PROCEDURE — 6360000002 HC RX W HCPCS: Performed by: STUDENT IN AN ORGANIZED HEALTH CARE EDUCATION/TRAINING PROGRAM

## 2021-09-30 PROCEDURE — 83605 ASSAY OF LACTIC ACID: CPT

## 2021-09-30 PROCEDURE — 2000000000 HC ICU R&B

## 2021-09-30 PROCEDURE — U0005 INFEC AGEN DETEC AMPLI PROBE: HCPCS

## 2021-09-30 PROCEDURE — 02FQ3Z0 FRAGMENTATION OF RIGHT PULMONARY ARTERY, PERCUTANEOUS APPROACH, ULTRASONIC: ICD-10-PCS | Performed by: RADIOLOGY

## 2021-09-30 PROCEDURE — 85027 COMPLETE CBC AUTOMATED: CPT

## 2021-09-30 PROCEDURE — 99291 CRITICAL CARE FIRST HOUR: CPT | Performed by: INTERNAL MEDICINE

## 2021-09-30 RX ORDER — HEPARIN SODIUM 1000 [USP'U]/ML
80 INJECTION, SOLUTION INTRAVENOUS; SUBCUTANEOUS PRN
Status: DISCONTINUED | OUTPATIENT
Start: 2021-09-30 | End: 2021-09-30

## 2021-09-30 RX ORDER — HEPARIN SODIUM 1000 [USP'U]/ML
80 INJECTION, SOLUTION INTRAVENOUS; SUBCUTANEOUS ONCE
Status: COMPLETED | OUTPATIENT
Start: 2021-09-30 | End: 2021-09-30

## 2021-09-30 RX ORDER — HEPARIN SODIUM 10000 [USP'U]/100ML
5-30 INJECTION, SOLUTION INTRAVENOUS CONTINUOUS
Status: DISPENSED | OUTPATIENT
Start: 2021-09-30 | End: 2021-10-02

## 2021-09-30 RX ORDER — HEPARIN SODIUM 10000 [USP'U]/100ML
5-30 INJECTION, SOLUTION INTRAVENOUS CONTINUOUS
Status: DISCONTINUED | OUTPATIENT
Start: 2021-09-30 | End: 2021-09-30

## 2021-09-30 RX ORDER — SODIUM CHLORIDE 0.9 % (FLUSH) 0.9 %
5-40 SYRINGE (ML) INJECTION PRN
Status: DISCONTINUED | OUTPATIENT
Start: 2021-09-30 | End: 2021-10-05 | Stop reason: HOSPADM

## 2021-09-30 RX ORDER — ACETAMINOPHEN 325 MG/1
650 TABLET ORAL EVERY 4 HOURS PRN
Status: DISCONTINUED | OUTPATIENT
Start: 2021-09-30 | End: 2021-09-30 | Stop reason: SDUPTHER

## 2021-09-30 RX ORDER — POTASSIUM CHLORIDE 7.45 MG/ML
10 INJECTION INTRAVENOUS
Status: COMPLETED | OUTPATIENT
Start: 2021-09-30 | End: 2021-09-30

## 2021-09-30 RX ORDER — SODIUM CHLORIDE 0.9 % (FLUSH) 0.9 %
5-40 SYRINGE (ML) INJECTION EVERY 12 HOURS SCHEDULED
Status: DISCONTINUED | OUTPATIENT
Start: 2021-09-30 | End: 2021-10-01

## 2021-09-30 RX ORDER — SODIUM CHLORIDE 9 MG/ML
25 INJECTION, SOLUTION INTRAVENOUS PRN
Status: DISCONTINUED | OUTPATIENT
Start: 2021-09-30 | End: 2021-10-05 | Stop reason: HOSPADM

## 2021-09-30 RX ORDER — POLYETHYLENE GLYCOL 3350 17 G/17G
17 POWDER, FOR SOLUTION ORAL DAILY PRN
Status: DISCONTINUED | OUTPATIENT
Start: 2021-09-30 | End: 2021-10-05 | Stop reason: HOSPADM

## 2021-09-30 RX ORDER — SODIUM CHLORIDE 9 MG/ML
INJECTION, SOLUTION INTRAVENOUS CONTINUOUS PRN
Status: DISCONTINUED | OUTPATIENT
Start: 2021-09-30 | End: 2021-10-01

## 2021-09-30 RX ORDER — ONDANSETRON 2 MG/ML
4 INJECTION INTRAMUSCULAR; INTRAVENOUS EVERY 6 HOURS PRN
Status: DISCONTINUED | OUTPATIENT
Start: 2021-09-30 | End: 2021-09-30 | Stop reason: SDUPTHER

## 2021-09-30 RX ORDER — SODIUM CHLORIDE 9 MG/ML
25 INJECTION, SOLUTION INTRAVENOUS PRN
Status: DISCONTINUED | OUTPATIENT
Start: 2021-09-30 | End: 2021-10-01

## 2021-09-30 RX ORDER — 0.9 % SODIUM CHLORIDE 0.9 %
1000 INTRAVENOUS SOLUTION INTRAVENOUS ONCE
Status: COMPLETED | OUTPATIENT
Start: 2021-09-30 | End: 2021-09-30

## 2021-09-30 RX ORDER — SODIUM CHLORIDE 0.9 % (FLUSH) 0.9 %
5-40 SYRINGE (ML) INJECTION PRN
Status: DISCONTINUED | OUTPATIENT
Start: 2021-09-30 | End: 2021-09-30

## 2021-09-30 RX ORDER — ONDANSETRON 2 MG/ML
4 INJECTION INTRAMUSCULAR; INTRAVENOUS EVERY 6 HOURS PRN
Status: DISCONTINUED | OUTPATIENT
Start: 2021-09-30 | End: 2021-09-30

## 2021-09-30 RX ORDER — ONDANSETRON 4 MG/1
4 TABLET, ORALLY DISINTEGRATING ORAL EVERY 8 HOURS PRN
Status: DISCONTINUED | OUTPATIENT
Start: 2021-09-30 | End: 2021-09-30

## 2021-09-30 RX ORDER — ACETAMINOPHEN 650 MG/1
650 SUPPOSITORY RECTAL EVERY 6 HOURS PRN
Status: DISCONTINUED | OUTPATIENT
Start: 2021-09-30 | End: 2021-10-05 | Stop reason: HOSPADM

## 2021-09-30 RX ORDER — HEPARIN SODIUM 1000 [USP'U]/ML
40 INJECTION, SOLUTION INTRAVENOUS; SUBCUTANEOUS PRN
Status: DISCONTINUED | OUTPATIENT
Start: 2021-09-30 | End: 2021-09-30

## 2021-09-30 RX ORDER — ONDANSETRON 4 MG/1
4 TABLET, ORALLY DISINTEGRATING ORAL EVERY 8 HOURS PRN
Status: DISCONTINUED | OUTPATIENT
Start: 2021-09-30 | End: 2021-09-30 | Stop reason: SDUPTHER

## 2021-09-30 RX ORDER — SODIUM CHLORIDE 9 MG/ML
INJECTION, SOLUTION INTRAVENOUS CONTINUOUS
Status: ACTIVE | OUTPATIENT
Start: 2021-09-30 | End: 2021-09-30

## 2021-09-30 RX ORDER — ACETAMINOPHEN 325 MG/1
650 TABLET ORAL EVERY 6 HOURS PRN
Status: DISCONTINUED | OUTPATIENT
Start: 2021-09-30 | End: 2021-10-05 | Stop reason: HOSPADM

## 2021-09-30 RX ADMIN — Medication 10 ML: at 22:10

## 2021-09-30 RX ADMIN — POTASSIUM CHLORIDE 10 MEQ: 7.45 INJECTION INTRAVENOUS at 15:04

## 2021-09-30 RX ADMIN — POTASSIUM CHLORIDE 10 MEQ: 7.45 INJECTION INTRAVENOUS at 16:13

## 2021-09-30 RX ADMIN — HEPARIN SODIUM 17 UNITS/KG/HR: 10000 INJECTION, SOLUTION INTRAVENOUS at 12:52

## 2021-09-30 RX ADMIN — POTASSIUM CHLORIDE 10 MEQ: 7.45 INJECTION INTRAVENOUS at 17:09

## 2021-09-30 RX ADMIN — POTASSIUM CHLORIDE 10 MEQ: 7.45 INJECTION INTRAVENOUS at 19:00

## 2021-09-30 RX ADMIN — HEPARIN SODIUM 9800 UNITS: 1000 INJECTION INTRAVENOUS; SUBCUTANEOUS at 12:53

## 2021-09-30 RX ADMIN — IOPAMIDOL 80 ML: 755 INJECTION, SOLUTION INTRAVENOUS at 12:27

## 2021-09-30 RX ADMIN — SODIUM CHLORIDE 1000 ML: 9 INJECTION, SOLUTION INTRAVENOUS at 21:40

## 2021-09-30 RX ADMIN — FAMOTIDINE 20 MG: 10 INJECTION, SOLUTION INTRAVENOUS at 21:50

## 2021-09-30 RX ADMIN — POTASSIUM CHLORIDE 10 MEQ: 7.45 INJECTION INTRAVENOUS at 18:17

## 2021-09-30 ASSESSMENT — PAIN SCALES - GENERAL
PAINLEVEL_OUTOF10: 0

## 2021-09-30 NOTE — H&P
Internal Medicine  PGY 1  History & Physical      CC SOB    History Obtained From:  patient    HISTORY OF PRESENT ILLNESS:  Rhett Whittaker a 78 y. o.female with a PMHx TERRANCE, HTN, MUNIZ w esophageal varices, obesity p/w chest pain. Pt had a code STEMI called for her chest pain which was found negative in the cath lab for any occlusion but right heart strain was seen concerning for massive PE. Chest pain started suddenly and occurred 4 hours prior to her coming in. Pt had some SOB attributed with it but it did radiate. In the field an ekg with subtle st elevation was seen. Pt denies any history of smoking.      Pt had a EKOS placed for TPA. Past Medical History:        Diagnosis Date    Arthritis     Back pain     Hypertension     Obesity     Sleep apnea    ·     Past Surgical History:        Procedure Laterality Date    COLONOSCOPY  8/17/2018    COLONOSCOPY POLYPECTOMY SNARE/COLD BIOPSY TRANSVERSE POLYP performed by Dustin Salazar MD at [address] N/A 2/12/2019    ROBOTIC ASSISTED LAPAROSCOPIC SLEEVE GASTRECTOMY, LAPAROSCOPIC HIATAL HERNIA REPAIR performed by New Everett DO at [facility]      UPPER GASTROINTESTINAL ENDOSCOPY N/A 8/17/2018    EGD BIOPSY GASTRIC ANTRUM BX R/O HP BX/COLD SNARE GASATRIC POLYP performed by New Everett DO at [facility] N/A 8/17/2018    EGD POLYP SNARE performed by New Everett DO at [facility]   ·     Medications Priorto Admission:    · Medications Prior to Admission: omeprazole (PRILOSEC) 40 MG delayed release capsule, TAKE ONE CAPSULE BY MOUTH DAILY  · losartan-hydrochlorothiazide (HYZAAR) 50-12.5 MG per tablet, Take 1 tablet by mouth daily  · Multiple Vitamins-Minerals (BARIATRIC FUSION PO), Take 4 tablets by mouth daily  · Compression Stockings MISC, by Does not apply route 3 pair. Put on in am and off at bed time.  20-30 mm    Allergies:  Patient has no known allergies. Social History:   · TOBACCO:   reports that she has never smoked. She has never used smokeless tobacco.  · ETOH:   reports no history of alcohol use. · DRUGS :   · Patient currently lives alone  ·   Family History:       Problem Relation Age of Onset    High Blood Pressure Mother     Anxiety Disorder Mother     Arthritis Mother    ·     Review of Systems    ROS: A 10 point review of systems was conducted, significant findings as noted in HPI. Physical Exam   Physical Exam  Vitals and nursing note reviewed. Constitutional:       Appearance: She is obese. Cardiovascular:      Rate and Rhythm: Normal rate and regular rhythm. Pulses: Normal pulses. Heart sounds: Normal heart sounds. Pulmonary:      Effort: Pulmonary effort is normal.      Breath sounds: Normal breath sounds. Comments: Right IJ catheter for EPOK  Skin:     General: Skin is warm. Capillary Refill: Capillary refill takes less than 2 seconds. Neurological:      General: No focal deficit present. Vitals:    09/30/21 1630   BP:    Pulse: 87   Resp: 20   Temp:    SpO2: 100%       DATA:    Labs:  CBC:   Recent Labs     09/30/21  1208   WBC 9.6   HGB 14.2   HCT 42.4          BMP:   Recent Labs     09/30/21  1206 09/30/21  1208   NA  --  139   K  --  2.8*   CL  --  100   CO2  --  21   BUN  --  34*   CREATININE 1.1 1.0   GLUCOSE  --  121*     LFT's: No results for input(s): AST, ALT, ALB, BILITOT, ALKPHOS in the last 72 hours. Troponin:   Recent Labs     09/30/21  1208   TROPONINI 0.02*     BNP:No results for input(s): BNP in the last 72 hours. ABGs: No results for input(s): PHART, SXN3MSP, PO2ART in the last 72 hours. INR: No results for input(s): INR in the last 72 hours.     U/A:No results for input(s): NITRITE, COLORU, PHUR, LABCAST, WBCUA, RBCUA, MUCUS, TRICHOMONAS, YEAST, BACTERIA, CLARITYU, SPECGRAV, LEUKOCYTESUR, UROBILINOGEN, BILIRUBINUR, BLOODU, GLUCOSEU, AMORPHOUS in the last 72 MD  9/30/2021,  4:58 PM

## 2021-09-30 NOTE — CONSULTS
Reason for Consultation/Chief Complaint: chest pain    History of Present Illness:  Kashif Haji is a 78 y.o. patient whom we were asked to see for possible STEMI. Chest pain beginning about 4 hrs. Mild chest pain. No previous sx of chest pain. Pain ache some sob. No pnd. Some sob if she lies down. In field ekg with subtle st elevation early precordia. Past Medical History:   has a past medical history of Arthritis, Back pain, Hypertension, Obesity, and Sleep apnea. Surgical History:   has a past surgical history that includes Tonsillectomy; Tonsillectomy; Upper gastrointestinal endoscopy (N/A, 8/17/2018); Upper gastrointestinal endoscopy (N/A, 8/17/2018); Colonoscopy (8/17/2018); and Sleeve Gastrectomy (N/A, 2/12/2019). Social History:   reports that she has never smoked. She has never used smokeless tobacco. She reports that she does not drink alcohol and does not use drugs. Family History:  No evidence for sudden cardiac death or premature CAD    Home Medications:  Were reviewed and are listed in nursing record. and/or listed below  Prior to Admission medications    Medication Sig Start Date End Date Taking? Authorizing Provider   omeprazole (PRILOSEC) 40 MG delayed release capsule TAKE ONE CAPSULE BY MOUTH DAILY 2/28/20   C Jason Arguello MD   losartan-hydrochlorothiazide [facility]) 50-12.5 MG per tablet Take 1 tablet by mouth daily 7/29/19   C Jason Arguello MD   Multiple Vitamins-Minerals (BARIATRIC FUSION PO) Take 4 tablets by mouth daily    Historical Provider, MD   Compression Stockings MISC by Does not apply route 3 pair. Put on in am and off at bed time. 20-30 mm 12/14/18   C Jason Arguello MD        Allergies:  Patient has no known allergies. Review of Systems:   Not done due emergency nature      Physical Examination:    There were no vitals filed for this visit.  Weight: 270 lb (122.5 kg)         General Appearance:  Alert, cooperative, no distress, appears stated age   Head: Normocephalic, without obvious abnormality, atraumatic   Eyes:  PERRL, conjunctiva/corneas clear       Nose: Nares normal, no drainage or sinus tenderness   Throat: Lips, mucosa, and tongue normal   Neck: Supple, symmetrical, trachea midline       Lungs:   Decreased BS, respirations unlabored   Chest Wall:  No tenderness or deformity   Heart:  Regular rate and rhythm, S1, S2 normal, no murmur, rub or gallop   Abdomen:   Soft, non-tender, bowel sounds active all four quadrants,  no masses, no organomegaly           Extremities: Extremities atraumatic, no cyanosis Tr edema       Skin: Skin color, texture, turgor normal, no rashes or lesions   Pysch: Normal mood and affect   Neurologic: Normal gross motor and sensory exam.         Labs  CBC:   Lab Results   Component Value Date    WBC 4.5 07/29/2019    RBC 4.00 07/29/2019    HGB 12.9 07/29/2019    HCT 38.1 07/29/2019    MCV 95.4 07/29/2019    RDW 14.3 07/29/2019     07/29/2019     CMP:    Lab Results   Component Value Date     07/29/2019    K 4.0 07/29/2019     07/29/2019    CO2 25 07/29/2019    BUN 23 07/29/2019    CREATININE 0.7 07/29/2019    GFRAA >60 07/29/2019    AGRATIO 1.3 02/07/2019    LABGLOM >60 07/29/2019    GLUCOSE 101 07/29/2019    PROT 7.0 07/29/2019    CALCIUM 9.9 07/29/2019    BILITOT 0.4 07/29/2019    ALKPHOS 109 07/29/2019    AST 25 07/29/2019    ALT 19 07/29/2019     PT/INR:  No results found for: PTINR  No results found for: CKTOTAL, CKMB, CKMBINDEX, TROPONINI    EKG:  I have reviewed EKG with the following interpretation:  Impression:  NSR, ST elevation early precordial. sT depression inf/lat    Assessment  Patient Active Problem List   Diagnosis    Chronic pain of left knee    Left ankle swelling    Class 2 obesity with alveolar hypoventilation without serious comorbidity with body mass index (BMI) of 37.0 to 37.9 in adult (HCC)    Fatigue    Fatty liver disease, nonalcoholic    Essential hypertension    Hiatal hernia with GERD    Idiopathic esophageal varices without bleeding (HCC)    Gastric polyp    Obstructive sleep apnea (adult) (pediatric)         Plan:    Chest pain beginning about 4 hrs ago. No cp prior. No prior cardiac hx. EKG suggestive of mild ST elevation anterior. Emergent cath. No other medical problems being tx recently. D/W pt.

## 2021-09-30 NOTE — CONSULTS
ICU Consult       Hospital Day:   ICU Day:                                                          Code:Full Code  Admit Date: 9/30/2021  PCP: Sarah Garcia MD                                  CC: Pulmonary Embolism    HISTORY OF PRESENT ILLNESS:   Marie Tamayo is a 78 y. o.female with a PMHx TERRANCE, HTN, MUNIZ w esophageal varices, obesity p/w chest pain. Pt had a code STEMI called for her chest pain which was found negative in the cath lab for any occlusion but right heart strain was seen concerning for massive PE. Chest pain started suddenly and occurred 4 hours prior to her coming in. Pt had some SOB attributed with it but it did radiate. In the field an ekg with subtle st elevation was seen. Pt denies any history of smoking. Pt had a EKOS placed for TPA. PAST HISTORY:     Past Medical History:   Diagnosis Date    Arthritis     Back pain     Hypertension     Obesity     Sleep apnea        Past Surgical History:   Procedure Laterality Date    COLONOSCOPY  8/17/2018    COLONOSCOPY POLYPECTOMY SNARE/COLD BIOPSY TRANSVERSE POLYP performed by Supriya Bills MD at [address] N/A 2/12/2019    ROBOTIC ASSISTED LAPAROSCOPIC SLEEVE GASTRECTOMY, LAPAROSCOPIC HIATAL HERNIA REPAIR performed by Palomo Chapman DO at [address] [facility]      UPPER GASTROINTESTINAL ENDOSCOPY N/A 8/17/2018    EGD BIOPSY GASTRIC ANTRUM BX R/O HP BX/COLD SNARE GASATRIC POLYP performed by Palomo Chapman DO at [address] N/A 8/17/2018    EGD POLYP SNARE performed by Palomo Chapman DO at [place]:   The patient lives at    Alcohol:  Illicit drugs: no use  Tobacco:      Family History:  Family History   Problem Relation Age of Onset    High Blood Pressure Mother     Anxiety Disorder Mother     Arthritis Mother        MEDICATIONS:     No current facility-administered medications on file prior to encounter.      Current Outpatient Medications on File Prior to Encounter   Medication Sig Dispense Refill    omeprazole (PRILOSEC) 40 MG delayed release capsule TAKE ONE CAPSULE BY MOUTH DAILY 30 capsule 2    losartan-hydrochlorothiazide (HYZAAR) 50-12.5 MG per tablet Take 1 tablet by mouth daily 90 tablet 3    Multiple Vitamins-Minerals (BARIATRIC FUSION PO) Take 4 tablets by mouth daily      Compression Stockings MISC by Does not apply route 3 pair. Put on in am and off at bed time. 20-30 mm 3 each 0         Scheduled Meds:   Continuous Infusions:   sodium chloride      heparin (PORCINE) Infusion 17 Units/kg/hr (09/30/21 1252)    alteplase (ACTIVASE) 10mg in 0.9% sodium chloride infusion (EKOS catheter)      alteplase (ACTIVASE) 10mg in 0.9% sodium chloride infusion (EKOS catheter)       PRN Meds:acetaminophen, heparin (porcine), heparin (porcine)    Allergies: No Known Allergies    REVIEW OF SYSTEMS:       History obtained from the patient    Review of Systems   All other systems reviewed and are negative. PHYSICAL EXAM:       Vitals: Ht 5' 2\" (1.575 m)   Wt 270 lb (122.5 kg)   BMI 49.38 kg/m²     I/O:  No intake or output data in the 24 hours ending 09/30/21 1349  No intake/output data recorded. No intake/output data recorded. Physical Examination:     Physical Exam  Vitals and nursing note reviewed. Constitutional:       Appearance: She is obese. Cardiovascular:      Rate and Rhythm: Normal rate and regular rhythm. Pulses: Normal pulses. Heart sounds: Normal heart sounds. Pulmonary:      Effort: Pulmonary effort is normal.      Breath sounds: Normal breath sounds. Comments: Right IJ catheter for EPOK  Skin:     General: Skin is warm. Capillary Refill: Capillary refill takes less than 2 seconds. Neurological:      General: No focal deficit present.            Access:   -Central Access Day #:                                   -Peripheral Access Day#:  -Arterial line Day#: [place] Day#:  NGT Day#:                                             ETT Day#:  Vent Settings:    / / /     No results for input(s): PHART, BPA3XIV, PO2ART in the last 72 hours. DATA:       Labs:  CBC: No results for input(s): WBC, HGB, HCT, PLT in the last 72 hours. BMP:   Recent Labs     09/30/21  1206   CREATININE 1.1     LFT's: No results for input(s): AST, ALT, ALB, BILITOT, ALKPHOS in the last 72 hours. Troponin: No results for input(s): TROPONINI in the last 72 hours. BNP:No results for input(s): BNP in the last 72 hours. ABGs: No results for input(s): PHART, PGY4LCL, PO2ART in the last 72 hours. INR: No results for input(s): INR in the last 72 hours. U/A:No results for input(s): NITRITE, COLORU, PHUR, LABCAST, WBCUA, RBCUA, MUCUS, TRICHOMONAS, YEAST, BACTERIA, CLARITYU, SPECGRAV, LEUKOCYTESUR, UROBILINOGEN, BILIRUBINUR, BLOODU, GLUCOSEU, AMORPHOUS in the last 72 hours. Invalid input(s): KETONESU    CT CHEST PULMONARY EMBOLISM W CONTRAST   Final Result      Massive pulmonary emboli with elevated RV/LV ratio consistent with right heart strain. .      Mild basilar atelectatic changes. Results called to Dr. Sarah Soria at 12:58 PM on 9/30/2021. EKG:   Echo:  Micro:     ASSESSMENT AND PLAN:   Estela Verdin is a 78 y.o. female, who had p/w 4 days of shortness of breath with right leg pain. Submassive Pulmonary Embolism with RV strain 2/2 DVT   Pt had 4 days of abdominal pain that was vague in nature which developed into SOB and mild chest pain that occurred suddenly which prompted her to come to the ED. Pt denied any smoking history, last mammogram 2 years ago was negative, not vaccinated for COVID. Pt lost taste and smell the last 2 weeks but has not had exposure to any known COVID people. Never had a blood clot before. ProBNP 5150 with EKG showing mild ST elevations in the anterior leads. Cath lab was negative for any ischemia in coronary arteries.  CT chest pulmonary artery

## 2021-09-30 NOTE — PROCEDURES
[address]               [address] [facility] [place] [address]                            CARDIAC CATHETERIZATION    PATIENT NAME: Daniel Velez                      :        1955  MED REC NO:   [id number]                          ROOM:  ACCOUNT NO:   [de-identified]                           ADMIT DATE: 2021  PROVIDER:     Lillian Webb MD    DATE OF PROCEDURE:  2021    PROCEDURES PERFORMED:  Left heart catheterization, coronary  cineangiography, and Angio-Seal of the right femoral arteriotomy site. HISTORY:  The patient is a 78-year-old female with no prior cardiac  history but a history of hypertension, who presents with complaints of  chest discomfort. She has chest discomfort, which came approximately  four hours prior to admission. She described as an ache in her chest.   She noted some shortness of breath. She may have some orthopnea. She  has never had chest pain recently. She again notes no cardiac history. She did have several ST elevation in the precordial leads with ST  depression inferolaterally and was felt to possibly have a STEMI. As  such, she was brought emergently to the cardiac catheterization lab. TECHNICAL PROCEDURE:  The patient was brought to the cardiac  catheterization lab emergently on 2021, where the right femoral  area was prepped and draped in the usual sterile fashion. After  anesthetizing the area with 2% lidocaine, a 6-[place] sheath was placed  in the right femoral artery using Seldinger technique. Subsequently,  selective coronary cineangiography of both left and right coronaries  were performed in multiple projections. This was performed using  5-[place] JR4 and 6-[place] 3.5 XB guide. Left heart catheterization and  left ventriculography were performed at the end of the procedure.   A  brief right femoral arteriogram was obtained to ascertain positioning  appropriate for Angio-Seal.  It was well positioned. She was  successfully sealed with standard 6-Polish Angio-Seal device. No  complications were encountered. Estimated blood loss less than 5cc. RESULTS:  HEMODYNAMICS:  Left ventricular end-diastolic pressure equals 5 to 10. There was no significant gradient across the aortic valve by pullback  post cineangiography. LEFT VENTRICULOGRAM:  Left ventriculogram was performed at the end of  the procedure. It appeared to have uniform wall motion with an  estimated ejection fraction of 55%. RIGHT CORONARY ARTERY:  The right coronary artery is a dominant vessel. It gave off a high-rising PDA and several distal posterolateral  branches. The right coronary artery was normal.    LEFT MAIN:  Left main was normal.    LEFT ANTERIOR DESCENDING:  The LAD courses to and wraps around the apex. It gave off three moderate size diagonal branches. The LAD system was  essentially normal.    LEFT CIRCUMFLEX:  Circumflex gave off two marginal branches before  distal posterolateral branch.  _____ distal posterolateral branch. Circumflex appeared to be normal.    IMPRESSION:  1. Preserved LV function with an estimated ejection fraction 55%. 2.  Essentially normal coronary arteries. 3.  Hypoxia, chest pain. 4.  We will have CTPA to rule out pulmonary embolism.         Mare Mcclendon MD    D: 09/30/2021 12:17:54       T: 09/30/2021 13:51:07     [email or web address]  Job#: [id number]     Doc#: [id number]    CC:

## 2021-09-30 NOTE — H&P
Patient:  Vianney Sanchez   :   1955      Relevant patient history reviewed and discussed. The procedure including risks and benefits was discussed at length with the patient (or designated family member) and all questions were answered. Informed consent to proceed with the procedure was given. Condition : stable    [facility] nurses notes reviewed and agreed. Medications reviewed.   Allergies: No Known Allergies

## 2021-09-30 NOTE — PROCEDURES
IR Brief Postoperative Note    Chilo Knox  YOB: 1955  [id number]    Pre-operative Diagnosis: massive PE, right heart strain    Post-operative Diagnosis: Same    Procedure: bilat pulm angio with thrombolysis    Anesthesia: local    Surgeons/Assistants: none    Estimated Blood Loss: Minimal    Complications: none    Specimens: were not obtained    See full procedure dictation to follow      Mark Gary MD MD  9/30/2021

## 2021-09-30 NOTE — PROGRESS NOTES
Patient admitted to room 4507 from cath lab. Altepase and ekos in place and infusing per orders. Right groin site is soft and scant sanguinous drainage noted. R IG catheter is oozing continuously. D stat and manual pressure applied. Patient is alert and oriented x 4. Vitals are stable at this time. Will continue to monitor closely.

## 2021-10-01 ENCOUNTER — APPOINTMENT (OUTPATIENT)
Dept: GENERAL RADIOLOGY | Age: 66
DRG: 167 | End: 2021-10-01
Attending: INTERNAL MEDICINE
Payer: COMMERCIAL

## 2021-10-01 LAB
A/G RATIO: 0.8 (ref 1.1–2.2)
ALBUMIN SERPL-MCNC: 3.3 G/DL (ref 3.4–5)
ALP BLD-CCNC: 67 U/L (ref 40–129)
ALT SERPL-CCNC: 22 U/L (ref 10–40)
ANION GAP SERPL CALCULATED.3IONS-SCNC: 16 MMOL/L (ref 3–16)
ANTI-XA UNFRAC HEPARIN: 0.31 IU/ML (ref 0.3–0.7)
ANTI-XA UNFRAC HEPARIN: 0.54 IU/ML (ref 0.3–0.7)
APTT: 49.5 SEC (ref 26.2–38.6)
APTT: 74.6 SEC (ref 26.2–38.6)
AST SERPL-CCNC: 42 U/L (ref 15–37)
BACTERIA: ABNORMAL /HPF
BILIRUB SERPL-MCNC: 1.5 MG/DL (ref 0–1)
BILIRUBIN URINE: ABNORMAL
BLOOD, URINE: ABNORMAL
BUN BLDV-MCNC: 37 MG/DL (ref 7–20)
CALCIUM SERPL-MCNC: 7.7 MG/DL (ref 8.3–10.6)
CHLORIDE BLD-SCNC: 105 MMOL/L (ref 99–110)
CLARITY: ABNORMAL
CO2: 22 MMOL/L (ref 21–32)
COLOR: YELLOW
CREAT SERPL-MCNC: 1.3 MG/DL (ref 0.6–1.2)
EKG ATRIAL RATE: 84 BPM
EKG ATRIAL RATE: 91 BPM
EKG DIAGNOSIS: NORMAL
EKG DIAGNOSIS: NORMAL
EKG P AXIS: 30 DEGREES
EKG P AXIS: 38 DEGREES
EKG P-R INTERVAL: 128 MS
EKG P-R INTERVAL: 132 MS
EKG Q-T INTERVAL: 498 MS
EKG Q-T INTERVAL: 506 MS
EKG QRS DURATION: 82 MS
EKG QRS DURATION: 82 MS
EKG QTC CALCULATION (BAZETT): 597 MS
EKG QTC CALCULATION (BAZETT): 612 MS
EKG R AXIS: 127 DEGREES
EKG R AXIS: 98 DEGREES
EKG T AXIS: -2 DEGREES
EKG T AXIS: 44 DEGREES
EKG VENTRICULAR RATE: 84 BPM
EKG VENTRICULAR RATE: 91 BPM
EPITHELIAL CELLS, UA: ABNORMAL /HPF (ref 0–5)
FIBRINOGEN: 160 MG/DL (ref 200–397)
GFR AFRICAN AMERICAN: 49
GFR NON-AFRICAN AMERICAN: 41
GLOBULIN: 3.9 G/DL
GLUCOSE BLD-MCNC: 117 MG/DL (ref 70–99)
GLUCOSE URINE: NEGATIVE MG/DL
HCT VFR BLD CALC: 31 % (ref 36–48)
HCT VFR BLD CALC: 34.4 % (ref 36–48)
HCT VFR BLD CALC: 36.8 % (ref 36–48)
HEMOGLOBIN: 10.3 G/DL (ref 12–16)
HEMOGLOBIN: 11.3 G/DL (ref 12–16)
HEMOGLOBIN: 12.1 G/DL (ref 12–16)
KETONES, URINE: NEGATIVE MG/DL
LACTIC ACID: 1.6 MMOL/L (ref 0.4–2)
LACTIC ACID: 2.3 MMOL/L (ref 0.4–2)
LEUKOCYTE ESTERASE, URINE: ABNORMAL
MAGNESIUM: 2.5 MG/DL (ref 1.8–2.4)
MCH RBC QN AUTO: 31.5 PG (ref 26–34)
MCH RBC QN AUTO: 31.6 PG (ref 26–34)
MCH RBC QN AUTO: 31.8 PG (ref 26–34)
MCHC RBC AUTO-ENTMCNC: 32.9 G/DL (ref 31–36)
MCHC RBC AUTO-ENTMCNC: 33 G/DL (ref 31–36)
MCHC RBC AUTO-ENTMCNC: 33.1 G/DL (ref 31–36)
MCV RBC AUTO: 95.6 FL (ref 80–100)
MCV RBC AUTO: 96 FL (ref 80–100)
MCV RBC AUTO: 96.2 FL (ref 80–100)
MICROSCOPIC EXAMINATION: YES
NITRITE, URINE: NEGATIVE
PDW BLD-RTO: 13.3 % (ref 12.4–15.4)
PDW BLD-RTO: 13.3 % (ref 12.4–15.4)
PDW BLD-RTO: 13.7 % (ref 12.4–15.4)
PH UA: 5 (ref 5–8)
PLATELET # BLD: 123 K/UL (ref 135–450)
PLATELET # BLD: 132 K/UL (ref 135–450)
PLATELET # BLD: 133 K/UL (ref 135–450)
PMV BLD AUTO: 8.7 FL (ref 5–10.5)
PMV BLD AUTO: 8.9 FL (ref 5–10.5)
PMV BLD AUTO: 9 FL (ref 5–10.5)
POTASSIUM REFLEX MAGNESIUM: 3.5 MMOL/L (ref 3.5–5.1)
POTASSIUM SERPL-SCNC: 4 MMOL/L (ref 3.5–5.1)
PROTEIN UA: 30 MG/DL
RBC # BLD: 3.23 M/UL (ref 4–5.2)
RBC # BLD: 3.58 M/UL (ref 4–5.2)
RBC # BLD: 3.85 M/UL (ref 4–5.2)
RBC UA: >100 /HPF (ref 0–4)
SARS-COV-2: NOT DETECTED
SODIUM BLD-SCNC: 143 MMOL/L (ref 136–145)
SPECIFIC GRAVITY UA: 1.02 (ref 1–1.03)
TOTAL PROTEIN: 7.2 G/DL (ref 6.4–8.2)
TROPONIN: 0.05 NG/ML
TROPONIN: 0.05 NG/ML
URINE REFLEX TO CULTURE: YES
URINE TYPE: ABNORMAL
UROBILINOGEN, URINE: 4 E.U./DL
WBC # BLD: 14 K/UL (ref 4–11)
WBC # BLD: 14 K/UL (ref 4–11)
WBC # BLD: 17.5 K/UL (ref 4–11)
WBC UA: >100 /HPF (ref 0–5)

## 2021-10-01 PROCEDURE — 87077 CULTURE AEROBIC IDENTIFY: CPT

## 2021-10-01 PROCEDURE — 2700000000 HC OXYGEN THERAPY PER DAY

## 2021-10-01 PROCEDURE — 2060000000 HC ICU INTERMEDIATE R&B

## 2021-10-01 PROCEDURE — 2500000003 HC RX 250 WO HCPCS: Performed by: STUDENT IN AN ORGANIZED HEALTH CARE EDUCATION/TRAINING PROGRAM

## 2021-10-01 PROCEDURE — 2580000003 HC RX 258: Performed by: RADIOLOGY

## 2021-10-01 PROCEDURE — 93010 ELECTROCARDIOGRAM REPORT: CPT | Performed by: INTERNAL MEDICINE

## 2021-10-01 PROCEDURE — 85027 COMPLETE CBC AUTOMATED: CPT

## 2021-10-01 PROCEDURE — 94150 VITAL CAPACITY TEST: CPT

## 2021-10-01 PROCEDURE — 80048 BASIC METABOLIC PNL TOTAL CA: CPT

## 2021-10-01 PROCEDURE — 93005 ELECTROCARDIOGRAM TRACING: CPT | Performed by: STUDENT IN AN ORGANIZED HEALTH CARE EDUCATION/TRAINING PROGRAM

## 2021-10-01 PROCEDURE — C1751 CATH, INF, PER/CENT/MIDLINE: HCPCS

## 2021-10-01 PROCEDURE — 36569 INSJ PICC 5 YR+ W/O IMAGING: CPT

## 2021-10-01 PROCEDURE — 02HV33Z INSERTION OF INFUSION DEVICE INTO SUPERIOR VENA CAVA, PERCUTANEOUS APPROACH: ICD-10-PCS | Performed by: STUDENT IN AN ORGANIZED HEALTH CARE EDUCATION/TRAINING PROGRAM

## 2021-10-01 PROCEDURE — 2580000003 HC RX 258: Performed by: INTERNAL MEDICINE

## 2021-10-01 PROCEDURE — 85520 HEPARIN ASSAY: CPT

## 2021-10-01 PROCEDURE — 94761 N-INVAS EAR/PLS OXIMETRY MLT: CPT

## 2021-10-01 PROCEDURE — 87088 URINE BACTERIA CULTURE: CPT

## 2021-10-01 PROCEDURE — 83605 ASSAY OF LACTIC ACID: CPT

## 2021-10-01 PROCEDURE — 2500000003 HC RX 250 WO HCPCS

## 2021-10-01 PROCEDURE — 84484 ASSAY OF TROPONIN QUANT: CPT

## 2021-10-01 PROCEDURE — 2580000003 HC RX 258

## 2021-10-01 PROCEDURE — 36415 COLL VENOUS BLD VENIPUNCTURE: CPT

## 2021-10-01 PROCEDURE — 6360000002 HC RX W HCPCS: Performed by: INTERNAL MEDICINE

## 2021-10-01 PROCEDURE — 99232 SBSQ HOSP IP/OBS MODERATE 35: CPT | Performed by: INTERNAL MEDICINE

## 2021-10-01 PROCEDURE — 81001 URINALYSIS AUTO W/SCOPE: CPT

## 2021-10-01 PROCEDURE — 6360000002 HC RX W HCPCS: Performed by: STUDENT IN AN ORGANIZED HEALTH CARE EDUCATION/TRAINING PROGRAM

## 2021-10-01 PROCEDURE — 87186 SC STD MICRODIL/AGAR DIL: CPT

## 2021-10-01 PROCEDURE — 85384 FIBRINOGEN ACTIVITY: CPT

## 2021-10-01 PROCEDURE — 83735 ASSAY OF MAGNESIUM: CPT

## 2021-10-01 PROCEDURE — 87086 URINE CULTURE/COLONY COUNT: CPT

## 2021-10-01 PROCEDURE — 85730 THROMBOPLASTIN TIME PARTIAL: CPT

## 2021-10-01 PROCEDURE — 71045 X-RAY EXAM CHEST 1 VIEW: CPT

## 2021-10-01 PROCEDURE — 2580000003 HC RX 258: Performed by: STUDENT IN AN ORGANIZED HEALTH CARE EDUCATION/TRAINING PROGRAM

## 2021-10-01 RX ORDER — SODIUM CHLORIDE 0.9 % (FLUSH) 0.9 %
5-40 SYRINGE (ML) INJECTION PRN
Status: DISCONTINUED | OUTPATIENT
Start: 2021-10-01 | End: 2021-10-05 | Stop reason: HOSPADM

## 2021-10-01 RX ORDER — OXYCODONE HYDROCHLORIDE AND ACETAMINOPHEN 5; 325 MG/1; MG/1
1 TABLET ORAL ONCE
Status: DISCONTINUED | OUTPATIENT
Start: 2021-10-01 | End: 2021-10-05 | Stop reason: HOSPADM

## 2021-10-01 RX ORDER — SODIUM CHLORIDE 9 MG/ML
25 INJECTION, SOLUTION INTRAVENOUS PRN
Status: DISCONTINUED | OUTPATIENT
Start: 2021-10-01 | End: 2021-10-05 | Stop reason: HOSPADM

## 2021-10-01 RX ORDER — LIDOCAINE HYDROCHLORIDE 10 MG/ML
5 INJECTION, SOLUTION EPIDURAL; INFILTRATION; INTRACAUDAL; PERINEURAL ONCE
Status: COMPLETED | OUTPATIENT
Start: 2021-10-01 | End: 2021-10-01

## 2021-10-01 RX ORDER — SODIUM CHLORIDE 0.9 % (FLUSH) 0.9 %
5-40 SYRINGE (ML) INJECTION EVERY 12 HOURS SCHEDULED
Status: DISCONTINUED | OUTPATIENT
Start: 2021-10-01 | End: 2021-10-05 | Stop reason: HOSPADM

## 2021-10-01 RX ORDER — SODIUM CHLORIDE 9 MG/ML
INJECTION, SOLUTION INTRAVENOUS CONTINUOUS
Status: ACTIVE | OUTPATIENT
Start: 2021-10-01 | End: 2021-10-02

## 2021-10-01 RX ADMIN — Medication 10 ML: at 08:09

## 2021-10-01 RX ADMIN — FAMOTIDINE 20 MG: 10 INJECTION, SOLUTION INTRAVENOUS at 08:09

## 2021-10-01 RX ADMIN — SODIUM CHLORIDE: 9 INJECTION, SOLUTION INTRAVENOUS at 09:09

## 2021-10-01 RX ADMIN — HEPARIN SODIUM 8 UNITS/KG/HR: 10000 INJECTION, SOLUTION INTRAVENOUS at 00:18

## 2021-10-01 RX ADMIN — LIDOCAINE HYDROCHLORIDE 5 ML: 10 INJECTION, SOLUTION EPIDURAL; INFILTRATION; INTRACAUDAL; PERINEURAL at 10:30

## 2021-10-01 RX ADMIN — CEFTRIAXONE 1000 MG: 1 INJECTION, POWDER, FOR SOLUTION INTRAMUSCULAR; INTRAVENOUS at 17:24

## 2021-10-01 RX ADMIN — FAMOTIDINE 20 MG: 10 INJECTION, SOLUTION INTRAVENOUS at 21:21

## 2021-10-01 ASSESSMENT — PAIN SCALES - GENERAL
PAINLEVEL_OUTOF10: 0

## 2021-10-01 NOTE — PLAN OF CARE
Problem: Falls - Risk of:  Goal: Will remain free from falls  Description: Will remain free from falls  Outcome: Ongoing  Note: Pt is a fall risk. Fall risk protocol in place. See Brock Tijerina Fall Score. Pt bed is in low position, bed alarm is on, side rails up, fall risk bracelet applied. , non-skid footwear in use. Patient/family educated on fall risk protocol, instructed to call for assistance when needed and belongings are in reach. assistance. Will continue with hourly rounds for po intake, pain needs, toileting and repositioning as needed. Will continue to monitor for needs. Problem: Falls - Risk of:  Goal: Absence of physical injury  Description: Absence of physical injury  Outcome: Ongoing     Problem: Skin Integrity:  Goal: Will show no infection signs and symptoms  Description: Will show no infection signs and symptoms  Outcome: Ongoing  Note: Pt at risk for skin breakdown. See Anson score. Pt remains on bedrest. Unable to reposition self in bed. Heels elevated off bed. Sacral heart mepilex intact to protect,  site inspected and intact underneath. Will continue to turn and reposition patient every two hours and as needed. Will continue to keep patient clean and dry, applying skin care cream as needed. Pillows used for repositioning q2hs. Will continue to monitor and assess for skin breakdown.      Problem: Skin Integrity:  Goal: Absence of new skin breakdown  Description: Absence of new skin breakdown  Outcome: Ongoing

## 2021-10-01 NOTE — PROCEDURES
DOUBLE PICC PROCEDURE NOTE  Chart reviewed for allergies, diagnosis, labs, known contraindications, reason for line placement and planned length of treatment. Informed consent noted to be signed and on chart. Insertion procedure discussed with patient/family member. Three patient identifiers - Patient name,   and MRN -  completed &  confirmed verbally. Time out performed Hat, mask and eye shield donned. PICC site cleaned with chlorhexidine wipes then scrubbed with Chloraprep one-step applicator for 30 seconds x 1. Hand Hygiene  performed with 3% Chlorhexidine surgical scrub x1 min prior to  Sterile gloves, sterile gown being donned. Patient draped using maximal sterile barrier technique ( head to toe ). PICC site scrubbed a 2nd time with Chloraprep One-Step applicator x 30 sec. Modified Seldinger  technique/ultrasound assisted and tip locating system utilized for insertion. 1% Lidocaine 5 ml injected intradermal pre-insertion. PICC tip location in the SVC confirmed by ECG technology Sherlock 3CG. Positive brisk blood return obtained from all lumens  and each lumen flushed with 10 mls  0.9% Sterile Sodium Chloride. All lumens flush easily with no resistance. Valve placed on all lumens followed by Alcohol Swab Caps on end of each. Bio-patch in place. Catheter secured with non-sutured locking device per hospital protocol. Sterile Tegaderm applied over PICC site. Sterile field maintained during procedure. PICC insertion, rhythm and positioning wire (utilized prn) accounted  for post procedure and disposed of in sharps. Appearance of site is Clean dry and intact without bleeding or edema. All edges of Tegaderm occlusive. Site marked with date and initials of RN placing line. Teaching performed to pt/family and noted in education section. Bed placed in low position post-procedure.  Top 2 side rails in up position call button in reach. Pt. Response to procedure tolerated well. RN notified of all of the above. A Double Lumen Power PICC was trimmed at 49 CM and placed per MANJU cephalic vein, 2 cm showing from insertion site.

## 2021-10-01 NOTE — PROGRESS NOTES
Transfer From ICU to Medical Floor    The patient was seen and examined. ICU Course:   Lyn Joel a 78 y. o. F with a PMHx TERRANCE, HTN, MUNIZ w esophageal varices, obesity p/w to ED on 9/30 for chest pain. EKG was concerning for ischemic changes so NSTEMI was called and patient was sent emergently to cath lab. LHC was negative but showed right heart strain concerning for PE. CTPA showed massive pulmonary emboli with elevated RV/LV ratio consistent with right heart strain. EKOS was placed for TPA administration. Heparin ggt was started. Patient had acute bleeding from cath site and R IJ catheter site after starting heparin ggt. Bleeding has stopped today and H/H has remained stable. Patient was stable enough to be transferred to general floor. Of note, patient is Jehovah Witness and refuses blood products. She has also been having decreased appetite, loss of taste. COVID test is pending. Vitals:    10/01/21 1300   BP: (!) 102/59   Pulse: 88   Resp: 17   Temp:    SpO2: 100%     Physical Exam  Constitutional:       Appearance: She is obese. HENT:      Head: Normocephalic and atraumatic. Nose: Nose normal.   Eyes:      Extraocular Movements: Extraocular movements intact. Conjunctiva/sclera: Conjunctivae normal.      Pupils: Pupils are equal, round, and reactive to light. Cardiovascular:      Rate and Rhythm: Normal rate and regular rhythm. Pulses: Normal pulses. Heart sounds: Normal heart sounds. Pulmonary:      Effort: Pulmonary effort is normal.      Breath sounds: Normal breath sounds. Abdominal:      General: Bowel sounds are normal.      Palpations: Abdomen is soft. Musculoskeletal:      Cervical back: Normal range of motion and neck supple. Small hematoma on R groin  Skin:     General: Skin is warm. Neurological:      General: No focal deficit present. Mental Status: She is alert and oriented to person, place, and time.    Psychiatric:         Mood and Affect: Mood normal.         Behavior: Behavior normal.         Thought Content: Thought content normal.         Judgment: Judgment normal.          Intake/Output Summary (Last 24 hours) at 10/1/2021 1347  Last data filed at 10/1/2021 0600  Gross per 24 hour   Intake 0 ml   Output --   Net 0 ml     Lab Results   Component Value Date    CREATININE 1.3 (H) 10/01/2021    BUN 37 (H) 10/01/2021     10/01/2021    K 4.0 10/01/2021     10/01/2021    CO2 22 10/01/2021     Lab Results   Component Value Date    WBC 14.0 (H) 10/01/2021    HGB 11.3 (L) 10/01/2021    HCT 34.4 (L) 10/01/2021    MCV 96.0 10/01/2021     (L) 10/01/2021          Scheduled Meds:   oxyCODONE-acetaminophen  1 tablet Oral Once    sodium chloride flush  5-40 mL IntraVENous 2 times per day    famotidine (PEPCID) injection  20 mg IntraVENous BID     Continuous Infusions:   sodium chloride      sodium chloride 100 mL/hr at 10/01/21 0909    sodium chloride      heparin (PORCINE) Infusion 8 Units/kg/hr (10/01/21 0018)     PRN Meds:sodium chloride flush, sodium chloride, perflutren lipid microspheres, sodium chloride, sodium chloride flush, polyethylene glycol, acetaminophen **OR** acetaminophen    The objective and subjective findings as well as the ICU course of treatment have been reviewed with the ICU team. The treatment plan has been reviewed with the ICU team. The patient is being transferred to [place] [address] in stable condition.     Houston Anand, PGY-1  Pager: [phone]

## 2021-10-01 NOTE — PROGRESS NOTES
ICU Progress Note    Admit Date: 9/30/2021  Day: 2  Vent Day: None  IV Access:Peripheral  IV Fluids:None  Vasopressors:None                Antibiotics: None  Diet: Diet NPO    CC: SOB     Interval history: pt seen and examined at bedside. Pt sleeping, appears comfortable. Reports minimal SOB. Denies CP, palpitations, abd pain, nausea/vomiting. Medications:     Scheduled Meds:   oxyCODONE-acetaminophen  1 tablet Oral Once    lidocaine 1 % injection  5 mL IntraDERmal Once    sodium chloride flush  5-40 mL IntraVENous 2 times per day    sodium chloride flush  5-40 mL IntraVENous 2 times per day    sodium chloride flush  5-40 mL IntraVENous 2 times per day    famotidine (PEPCID) injection  20 mg IntraVENous BID     Continuous Infusions:   sodium chloride      sodium chloride      sodium chloride      sodium chloride      heparin (PORCINE) Infusion 8 Units/kg/hr (10/01/21 0018)     PRN Meds:sodium chloride flush, sodium chloride, perflutren lipid microspheres, sodium chloride, sodium chloride, sodium chloride flush, sodium chloride, polyethylene glycol, acetaminophen **OR** acetaminophen    Objective:   Vitals:   T-max:  Patient Vitals for the past 8 hrs:   BP Temp Temp src Pulse Resp SpO2   10/01/21 0800 (!) 95/52 97.9 °F (36.6 °C) Oral 85 17 100 %   10/01/21 0730 93/62 -- -- 84 17 100 %   10/01/21 0400 -- 97.6 °F (36.4 °C) Oral -- -- --   10/01/21 0145 -- -- -- 87 21 100 %   10/01/21 0130 94/68 -- -- 86 20 100 %   10/01/21 0115 -- -- -- 86 20 100 %   10/01/21 0100 93/71 -- -- 87 20 --   10/01/21 0045 -- -- -- 86 20 --   10/01/21 0030 85/73 -- -- 94 18 --       Intake/Output Summary (Last 24 hours) at 10/1/2021 0829  Last data filed at 10/1/2021 0600  Gross per 24 hour   Intake 0 ml   Output --   Net 0 ml         Physical Exam  Constitutional:       Appearance: She is obese. HENT:      Head: Normocephalic and atraumatic.       Nose: Nose normal.   Eyes:      Extraocular Movements: Extraocular movements intact. Conjunctiva/sclera: Conjunctivae normal.      Pupils: Pupils are equal, round, and reactive to light. Cardiovascular:      Rate and Rhythm: Normal rate and regular rhythm. Pulses: Normal pulses. Heart sounds: Normal heart sounds. Pulmonary:      Effort: Pulmonary effort is normal.      Breath sounds: Normal breath sounds. Abdominal:      General: Bowel sounds are normal.      Palpations: Abdomen is soft. Musculoskeletal:      Cervical back: Normal range of motion and neck supple. Skin:     General: Skin is warm. Neurological:      General: No focal deficit present. Mental Status: She is alert and oriented to person, place, and time. Psychiatric:         Mood and Affect: Mood normal.         Behavior: Behavior normal.         Thought Content: Thought content normal.         Judgment: Judgment normal.           LABS:    CBC:   Recent Labs     09/30/21 1733 09/30/21 2129 10/01/21  0357   WBC 11.0 16.3* 17.5*   HGB 14.4 13.8 12.1   HCT 43.4 42.1 36.8    115* 123*   MCV 95.6 96.0 95.6     Renal:    Recent Labs     09/30/21  1208 09/30/21  1733 10/01/21  0357    144 143   K 2.8* 3.5 4.0    102 105   CO2 21 24 22   BUN 34* 33* 37*   CREATININE 1.0 1.1 1.3*   GLUCOSE 121* 93 117*   CALCIUM 8.6 8.6 7.7*   MG  --  2.80* 2.50*   ANIONGAP 18* 18* 16     Hepatic:   Recent Labs     09/30/21 1733   AST 42*   ALT 22   BILITOT 1.5*   PROT 7.2   LABALBU 3.3*   ALKPHOS 67     Troponin:   Recent Labs     09/30/21  1208 09/30/21 1733   TROPONINI 0.02* 0.06*     BNP: No results for input(s): BNP in the last 72 hours. Lipids: No results for input(s): CHOL, HDL in the last 72 hours. Invalid input(s): LDLCALCU, TRIGLYCERIDE  ABGs:  No results for input(s): PHART, CII3SPO, PO2ART, QRY1RLU, BEART, THGBART, W6CUCFFD, XYK0IMB in the last 72 hours. INR: No results for input(s): INR in the last 72 hours.   Lactate: No results for input(s): LACTATE in the last 72 bring hers in.     HTN  -Patient takes lisinopril at home- currently held as bp are soft.      MUNIZ  - w/ Hx of esophageal varicies. Cont pepcid. Monitor Hgb/clinical status closely for signs of GI bleed     Pt is Jehovahs Witness and will not take whole blood products. Code Status:limited   FEN: regular diet   PPX: hep gtt/SCDs   DISPO: ICU     Carin Melchor MD, PGY-1   10/01/21  8:29 AM    This patient has been staffed and discussed with the attending physician. Pulm/CC     Patient seen and examined. I agree with Dr. Rico Harrison history, physical, lab findings, assessment and plan and edited as needed.     Patient clinically improving from submassive PE after EKOS. She is currently on a heparin drip and has no significant bleeding. Her SARS-CoV-2 PCR is not detected. Down to 2 L of oxygen with an oxygen saturation of 100%. She can transfer to telemetry with likely conversion to [facility] tomorrow.   She is out of isolation her echo can be performed to assess severity of pulmonary hypertension    Reynaldo Garcia MD

## 2021-10-01 NOTE — PROGRESS NOTES
Patient is alert and oriented x 4. Vitals are stable/consistent. Systolic BP sustaining in the 90's, MAP > 65. Patient denies SOB, denies chest pain. Patient resting comfortably in bed at this time. No drainage on dressings to puncture sites, hematoma to R groin is softer and decreased in size from last night, approximately 3 cm. Pedal pulse is audible with doppler, post tib + 1. Patient endorses full sensation. Will continue to monitor.

## 2021-10-01 NOTE — CARE COORDINATION
Case Management Assessment           Initial Evaluation                Date / Time of Evaluation: 10/1/2021 10:30 AM                 Assessment Completed by: Brianne Sommer RN    Patient Name: Marie Tamayo     YOB: 1955  Diagnosis: Chest pain [R07.9]     Date / Time: 9/30/2021 11:49 AM    Patient Admission Status: Inpatient    If patient is discharged prior to next notation, then this note serves as note for discharge by case management. Current PCP: Sarah Garcia MD  Clinic Patient: No    Chart Reviewed: Yes  Patient/ Family Interviewed: Yes    Initial assessment completed at bedside with: no  D/T Covid r/o Isolation     Hospitalization in the last 30 days: No    Emergency Contacts:  Extended Emergency Contact Information  Primary Emergency Contact: [address] of [address] [place] [address] Phone: [phone]  Relation: Child  Secondary Emergency Contact: Gina Escobedo [facility] [address] Phone: [phone]  Relation: Other    Advance Directives:   Code Status: [address]: No  Agent: NA  Contact Number: NA    Financial  Payor: Lakeisha Sargent / Plan: Jasmyne Aj / Product Type: *No Product type* /     Pre-cert required for SNF: Yes    Pharmacy    [facility] [address] [place] [address] Rd, [address] [place] [address]  Phone: [phone] Fax: [phone]      Potential assistance Purchasing Medications:    Does Patient want to participate in local refill/ meds to beds program?:      Meds To Beds General Rules:  1. Can ONLY be done Monday- Friday between 8:30am-5pm  2. Prescription(s) must be in pharmacy by 3pm to be filled same day  3. Copy of patient's insurance/ prescription drug card and patient face sheet must be sent along with the prescription(s)  4. Cost of Rx cannot be added to hospital bill.  If financial assistance is needed, please contact unit case manager or ;  or  CANNOT provide pharmacy voucher for patients co-pays  5.  Patients can then  the prescription on their way out of the hospital at discharge, or pharmacy can deliver to the bedside if staff is available. (payment due at time of pick-up or delivery - cash, check, or card accepted)     Able to afford home medications/ co-pay costs: Yes    ADLS  Support Systems:      PT AM-PAC:     OT AM-PAC:       [place]: home with room mate    Steps: NA    Plans to RETURN to current housing: Yes  Barriers to RETURNING to current housin [place]  Currently ACTIVE with  [address]: {Yes/No:}  [facility]  [employer] Providers:[id number]}    Currently ACTIVE with [place] on Aging: {Yes/No:}  Passport/ Waiver: {Yes/No:}  Passport/ Waiver Services: {[facility] Services:[id number]}      Durable Medical Equipment  DME Provider: MEGAN  Equipment: ***    Home Oxygen and [address] prior to admission: No  [address]: Not Applicable  Other Respiratory Equipment: has  CPAP    Informed of need to bring portable home O2 tank on day of DISCHARGE for nursing to connect prior to leaving: No  Verbalized agreement/Understanding: No  Person to bring portable tank at discharge: NA    Dialysis  Active with HD/PD prior to admission: No  Nephrologist: Ludivina Whyte 61:  Not Applicable    DISCHARGE PLAN:  Disposition: Home with  [employer]: TBD  may benefit from PT OT eval     Transportation PLAN for discharge: family     Factors facilitating achievement of predicted outcomes: Family support, Cooperative and Pleasant    Barriers to discharge: medical clearance     Additional Case Management Notes:     CM following for  D/c planning   And  Needs :    -  Did not see the patient Face to Face  to reduce the use of PPE and exposure to other patients: COvid  R/O pending     The Plan for Transition of Care is related to the following treatment goals of Chest pain [R07.9]    The Patient and/or patient representative Laura Lutz and her family were provided with a choice of provider and agrees with the discharge plan Yes    Freedom of choice list was provided with basic dialogue that supports the patient's individualized plan of care/goals and shares the quality data associated with the providers.  Yes    Care Transition patient: No    Kiran Modi RN  [facility]  Case Management Department  Ph: [phone]

## 2021-10-01 NOTE — PROGRESS NOTES
Hospitalist Progress Note      PCP: Franca Burgos MD    Date of Admission: 9/30/2021    Chief Complaint: chest pain    Hospital Course:   Vinod Moran 78 y.o. female  - PMHx TERRANCE, HTN, MUNIZ w esophageal varices, obesity   - presented with epigastric fullness/shortness of breath  She says for the last 1 week she has not been feeling well has no appetite, poor sense of taste and smell, easily getting fatigued tired, having myalgias, has been sitting on her couch and not able to ambulate around much. She was unable to take deep breaths, felt as if her gas bubble was sitting at the bedside,, she was unable to belch or pass flatus.  - EMS was called concern for STEMI, on arrival needing 15 L hiflo to maintain O2 sats, code STEMI called for her chest pain which was found negative in the cath lab for any occlusion but right heart strain was seen concerning for massive PE. Underwent emergent IR directed thrombolysis. Her Right groin LHC sheath was removed and noted to have bleeding from there after heparin infusion. She was also noted to have bleeding from Right IJ catheter site.     Subjective: feels ok, still no taste or smell, denies abdominal discomfort, says she could eat    Medications:  Reviewed    Infusion Medications    sodium chloride      sodium chloride 100 mL/hr at 10/01/21 0909    sodium chloride      heparin (PORCINE) Infusion 8 Units/kg/hr (10/01/21 0018)     Scheduled Medications    oxyCODONE-acetaminophen  1 tablet Oral Once    sodium chloride flush  5-40 mL IntraVENous 2 times per day    famotidine (PEPCID) injection  20 mg IntraVENous BID     PRN Meds: sodium chloride flush, sodium chloride, perflutren lipid microspheres, sodium chloride, sodium chloride flush, polyethylene glycol, acetaminophen **OR** acetaminophen      Intake/Output Summary (Last 24 hours) at 10/1/2021 1325  Last data filed at 10/1/2021 0600  Gross per 24 hour   Intake 0 ml   Output --   Net 0 ml       Physical Exam Performed:    BP 85/65   Pulse 91   Temp 97.9 °F (36.6 °C) (Oral)   Resp 16   Ht 5' 2\" (1.575 m)   Wt 265 lb 3.4 oz (120.3 kg)   SpO2 100%   BMI 48.51 kg/m²     General appearance: No apparent distress, appears stated age and cooperative. HEENT: Pupils equal, round, and reactive to light. Conjunctivae/corneas clear. Neck: Supple, with full range of motion. No jugular venous distention. Trachea midline. Respiratory:  Normal respiratory effort at rest. Clear to auscultation, bilaterally without Rales/Wheezes/Rhonchi. Cardiovascular: Regular rate and rhythm with normal S1/S2 without murmurs, rubs or gallops. Abdomen: Soft, non-tender, non-distended with normal bowel sounds. Musculoskeletal: No clubbing, cyanosis or edema bilaterally. Full range of motion without deformity. Right groin site without evidence of signifcant hematoma or active bleeding  Right IJ site with dressing CDI  Skin: Skin color, texture, turgor normal.  No rashes or lesions. Neurologic:  Neurovascularly intact without any focal sensory/motor deficits. Cranial nerves: II-XII intact, grossly non-focal.  Psychiatric: Alert and oriented, thought content appropriate, normal insight  Capillary Refill: Brisk,< 3 seconds   Peripheral Pulses: +2 palpable, equal bilaterally       Labs:   Recent Labs     09/30/21  2129 10/01/21  0357 10/01/21  1105   WBC 16.3* 17.5* 14.0*   HGB 13.8 12.1 11.3*   HCT 42.1 36.8 34.4*   * 123* 132*     Recent Labs     09/30/21  1208 09/30/21  1733 10/01/21  0357    144 143   K 2.8* 3.5  3.5 4.0    102 105   CO2 21 24 22   BUN 34* 33* 37*   CREATININE 1.0 1.1 1.3*   CALCIUM 8.6 8.6 7.7*     Recent Labs     09/30/21 1733   AST 42*   ALT 22   BILITOT 1.5*   ALKPHOS 67     No results for input(s): INR in the last 72 hours.   Recent Labs     09/30/21  1733 10/01/21  0357 10/01/21  1105   TROPONINI 0.06* 0.05* 0.05*       Urinalysis:    No results found for: Lanis Aquas, BACTERIA, RBCUA, BLOODU, Iban Mohan    Radiology:  IR ANGIOGRAM PULMONARY BILATERAL   Final Result      1. Satisfactory bilateral selective pulmonary artery lower lobe catheterization with EKOS TPA ultrasound catheter assisted delivery of TPA as described. 2. Rate: 6 hour infusion with 1 mg/h through each catheter has been scheduled         CT CHEST PULMONARY EMBOLISM W CONTRAST   Final Result      Massive pulmonary emboli with elevated RV/LV ratio consistent with right heart strain. .      Mild basilar atelectatic changes. Results called to Dr. Lisa Iyer at 12:58 PM on 9/30/2021. Assessment/Plan:    Active Hospital Problems    Diagnosis Date Noted    Chest pain of uncertain etiology [Y03.7] 09/30/2021    Hypoxia [R09.02] 09/30/2021    Abnormal EKG [R94.31] 09/30/2021    Chest pain [R07.9] 09/30/2021     Acute respiratory failure with hypoxemia due to acute pulmonary embolism with acute cor-pulmonale; needing 15 L Hiflo on admission to maintain o2 sats  Pt denied any smoking history, last mammogram 2 years ago was negative, she is morbidly obese and has TERRANCE  Family hx of nephew with blood clots  Pt has fatigue, lost taste and smell, decreased apetite the last 1 weeks, Rule out COVID19 as could have contributed to this, she is not vaccinated  No hx of DVT/PE. ProBNP 5150   CT chest pulmonary artery showed Saddle pulmonary embolism with additionally extensive bilateral filling defects seen in the bilateral upper and lower lobe pulmonary arteries elevated RV/LV ratio consistent with right heart strain.     Underwent emergent IR bilat pulm angio with thrombolysis  Will need NOAC on discharge, I am however concerned with her hx of esophageal varices   I will sent Apixaban starter pack to pharmacy for meds to beds so if she leaves over the weekend she has her medications  Will need outpatient garcia for hypercaog and age appropriate cancer screening if covid negative    MEREDITH; not present on admisison  Cr 1.3 this am, likely hemodynamic changes and contrast given on admission  Avoid NSAIDs (Ibuprofen, Aleve, Motrin, Naproxen, Toradol etc), Fleet enemas, IV contrast Dye and other nephrotoxins! Chest pain, ST elevation on admission, LHC with LV uniform. EF 55%, Essentially normal cors, ACS ruled out, chest pain was due to acute PE    Acute blood loss anemia from catheter site with heparin gtt use, Hgb dropped from 14.2 to 11.3  - monitor CBC q 8 hrs  - transfuse for Hgb < 7.0 or < 8.0 if hemodynamically unstable    Hypokalemia POA - resolved with supplementation    TERRANCE - home CPAP    Hypertension - holding home lisinopril with borderline blood pressures    Morbid obesity due to excess calories Body mass index is 48.51 kg/m². - Complicating assessment and treatment. Placing patient at risk for multiple co-morbidities as well as early death and contributing to the patient's presentation.   on weight loss when appropriate.     DVT Prophylaxis: heparin gtt  Diet: Diet NPO  Code Status: Limited    PT/OT Eval Status: order once acute issues resolved    Dispo - continue inpatient care, ok to transfer to PCU    Santy Damon MD   Hospitalist

## 2021-10-02 LAB
ANION GAP SERPL CALCULATED.3IONS-SCNC: 10 MMOL/L (ref 3–16)
BUN BLDV-MCNC: 27 MG/DL (ref 7–20)
CALCIUM SERPL-MCNC: 7.7 MG/DL (ref 8.3–10.6)
CHLORIDE BLD-SCNC: 107 MMOL/L (ref 99–110)
CO2: 25 MMOL/L (ref 21–32)
CREAT SERPL-MCNC: 1 MG/DL (ref 0.6–1.2)
GFR AFRICAN AMERICAN: >60
GFR NON-AFRICAN AMERICAN: 55
GLUCOSE BLD-MCNC: 107 MG/DL (ref 70–99)
HCT VFR BLD CALC: 27 % (ref 36–48)
HCT VFR BLD CALC: 28.9 % (ref 36–48)
HCT VFR BLD CALC: 31.9 % (ref 36–48)
HEMOGLOBIN: 11.2 G/DL (ref 12–16)
HEMOGLOBIN: 9 G/DL (ref 12–16)
HEMOGLOBIN: 9.6 G/DL (ref 12–16)
LV EF: 58 %
LVEF MODALITY: NORMAL
MAGNESIUM: 2.6 MG/DL (ref 1.8–2.4)
MCH RBC QN AUTO: 32 PG (ref 26–34)
MCH RBC QN AUTO: 32.9 PG (ref 26–34)
MCHC RBC AUTO-ENTMCNC: 33.1 G/DL (ref 31–36)
MCHC RBC AUTO-ENTMCNC: 34.9 G/DL (ref 31–36)
MCV RBC AUTO: 94.2 FL (ref 80–100)
MCV RBC AUTO: 96.4 FL (ref 80–100)
PDW BLD-RTO: 13.6 % (ref 12.4–15.4)
PDW BLD-RTO: 13.7 % (ref 12.4–15.4)
PLATELET # BLD: 148 K/UL (ref 135–450)
PLATELET # BLD: 153 K/UL (ref 135–450)
PMV BLD AUTO: 8.4 FL (ref 5–10.5)
PMV BLD AUTO: 9.6 FL (ref 5–10.5)
POTASSIUM SERPL-SCNC: 3.4 MMOL/L (ref 3.5–5.1)
RBC # BLD: 3 M/UL (ref 4–5.2)
RBC # BLD: 3.39 M/UL (ref 4–5.2)
SODIUM BLD-SCNC: 142 MMOL/L (ref 136–145)
WBC # BLD: 12.7 K/UL (ref 4–11)
WBC # BLD: 13.7 K/UL (ref 4–11)

## 2021-10-02 PROCEDURE — 6370000000 HC RX 637 (ALT 250 FOR IP): Performed by: STUDENT IN AN ORGANIZED HEALTH CARE EDUCATION/TRAINING PROGRAM

## 2021-10-02 PROCEDURE — 82746 ASSAY OF FOLIC ACID SERUM: CPT

## 2021-10-02 PROCEDURE — C8929 TTE W OR WO FOL WCON,DOPPLER: HCPCS

## 2021-10-02 PROCEDURE — 36415 COLL VENOUS BLD VENIPUNCTURE: CPT

## 2021-10-02 PROCEDURE — 2580000003 HC RX 258: Performed by: STUDENT IN AN ORGANIZED HEALTH CARE EDUCATION/TRAINING PROGRAM

## 2021-10-02 PROCEDURE — 2500000003 HC RX 250 WO HCPCS: Performed by: STUDENT IN AN ORGANIZED HEALTH CARE EDUCATION/TRAINING PROGRAM

## 2021-10-02 PROCEDURE — 2580000003 HC RX 258: Performed by: INTERNAL MEDICINE

## 2021-10-02 PROCEDURE — 82607 VITAMIN B-12: CPT

## 2021-10-02 PROCEDURE — 83735 ASSAY OF MAGNESIUM: CPT

## 2021-10-02 PROCEDURE — 6370000000 HC RX 637 (ALT 250 FOR IP): Performed by: INTERNAL MEDICINE

## 2021-10-02 PROCEDURE — 80048 BASIC METABOLIC PNL TOTAL CA: CPT

## 2021-10-02 PROCEDURE — 2060000000 HC ICU INTERMEDIATE R&B

## 2021-10-02 PROCEDURE — 99233 SBSQ HOSP IP/OBS HIGH 50: CPT | Performed by: INTERNAL MEDICINE

## 2021-10-02 PROCEDURE — 6360000004 HC RX CONTRAST MEDICATION: Performed by: STUDENT IN AN ORGANIZED HEALTH CARE EDUCATION/TRAINING PROGRAM

## 2021-10-02 PROCEDURE — 85027 COMPLETE CBC AUTOMATED: CPT

## 2021-10-02 PROCEDURE — 6360000002 HC RX W HCPCS: Performed by: INTERNAL MEDICINE

## 2021-10-02 PROCEDURE — 85014 HEMATOCRIT: CPT

## 2021-10-02 PROCEDURE — 99233 SBSQ HOSP IP/OBS HIGH 50: CPT | Performed by: NURSE PRACTITIONER

## 2021-10-02 PROCEDURE — 85018 HEMOGLOBIN: CPT

## 2021-10-02 RX ORDER — POTASSIUM CHLORIDE 20 MEQ/1
40 TABLET, EXTENDED RELEASE ORAL ONCE
Status: COMPLETED | OUTPATIENT
Start: 2021-10-02 | End: 2021-10-02

## 2021-10-02 RX ORDER — FAMOTIDINE 20 MG/1
20 TABLET, FILM COATED ORAL 2 TIMES DAILY
Status: DISCONTINUED | OUTPATIENT
Start: 2021-10-02 | End: 2021-10-05 | Stop reason: HOSPADM

## 2021-10-02 RX ORDER — POTASSIUM CHLORIDE 20 MEQ/1
40 TABLET, EXTENDED RELEASE ORAL 2 TIMES DAILY WITH MEALS
Status: DISCONTINUED | OUTPATIENT
Start: 2021-10-02 | End: 2021-10-02

## 2021-10-02 RX ADMIN — Medication 5 ML: at 09:07

## 2021-10-02 RX ADMIN — APIXABAN 10 MG: 5 TABLET, FILM COATED ORAL at 13:46

## 2021-10-02 RX ADMIN — POTASSIUM CHLORIDE 40 MEQ: 1500 TABLET, EXTENDED RELEASE ORAL at 09:06

## 2021-10-02 RX ADMIN — FAMOTIDINE 20 MG: 10 INJECTION, SOLUTION INTRAVENOUS at 09:06

## 2021-10-02 RX ADMIN — PERFLUTREN 1.65 MG: 6.52 INJECTION, SUSPENSION INTRAVENOUS at 10:37

## 2021-10-02 RX ADMIN — SODIUM CHLORIDE 200 MG: 900 INJECTION, SOLUTION INTRAVENOUS at 12:00

## 2021-10-02 RX ADMIN — CEFTRIAXONE 1000 MG: 1 INJECTION, POWDER, FOR SOLUTION INTRAMUSCULAR; INTRAVENOUS at 14:36

## 2021-10-02 ASSESSMENT — ENCOUNTER SYMPTOMS
ABDOMINAL DISTENTION: 0
SORE THROAT: 0
VOMITING: 0
COUGH: 0
DIARRHEA: 0
ABDOMINAL PAIN: 0
NAUSEA: 0
CONSTIPATION: 0
SHORTNESS OF BREATH: 0
CHEST TIGHTNESS: 0
BACK PAIN: 0

## 2021-10-02 ASSESSMENT — PAIN SCALES - GENERAL
PAINLEVEL_OUTOF10: 0

## 2021-10-02 ASSESSMENT — PAIN DESCRIPTION - ORIENTATION: ORIENTATION: MID;POSTERIOR

## 2021-10-02 ASSESSMENT — PAIN DESCRIPTION - LOCATION: LOCATION: NECK

## 2021-10-02 NOTE — PLAN OF CARE
Problem: Falls - Risk of:  Goal: Will remain free from falls  Description: Will remain free from falls  Outcome: Met This Shift  Note: Side rails up x 2 call light in reach bed in low position bed/chair alarm on when in use     Problem: Falls - Risk of:  Goal: Absence of physical injury  Description: Absence of physical injury  Outcome: Met This Shift     Problem: Skin Integrity:  Goal: Will show no infection signs and symptoms  Description: Will show no infection signs and symptoms  Outcome: Met This Shift  Note: Turns with min. Assist, cont. To monitor; rt. Groin dsg CDI small hematoma; rt. Neck dsg where line was cdi     Problem: Skin Integrity:  Goal: Absence of new skin breakdown  Description: Absence of new skin breakdown  Outcome: Met This Shift     Problem: Discharge Planning:  Goal: Discharged to appropriate level of care  Description: Discharged to appropriate level of care  Outcome: Met This Shift  Note: Home with daughter when medically stable     Problem: Pain:  Description: Pain management should include both nonpharmacologic and pharmacologic interventions. Goal: Pain level will decrease  Description: Pain level will decrease  Outcome: Met This Shift  Note: Denies cp /sob     Problem: Pain:  Description: Pain management should include both nonpharmacologic and pharmacologic interventions. Goal: Control of acute pain  Description: Control of acute pain  Outcome: Met This Shift     Problem: Pain:  Description: Pain management should include both nonpharmacologic and pharmacologic interventions.   Goal: Control of chronic pain  Description: Control of chronic pain  Outcome: Met This Shift

## 2021-10-02 NOTE — PROGRESS NOTES
Progress Note    Admit Date: 9/30/2021  Day: 2  Diet: ADULT DIET; Regular    CC: Chest pain    Interval history:   NAONE. Patient was seen and examined this am. Remained afebrile, but was tachycardic and hypotensive overnight. Sating well on RA. Reports SOB but overall feeling better. Patient denies any CP, palpitations, abdominal pain, c/d, urinary symptoms. No more bleeding reported at catheter and R IJ site. Reporting decreased appetite. HPI:   Warden Lam a 78 y. o.female with a PMHx TERRANCE, HTN, MUNIZ w esophageal varices, obesity p/w chest pain. Pt had a code STEMI called for her chest pain which was found negative in the cath lab for any occlusion but right heart strain was seen concerning for massive PE. Chest pain started suddenly and occurred 4 hours prior to her coming in. Pt had some SOB attributed with it but it did radiate. In the field an ekg with subtle st elevation was seen.  Pt denies any history of smoking.      Pt had a EKOS placed for TPA.     Medications:     Scheduled Meds:   iron sucrose  200 mg IntraVENous Once    oxyCODONE-acetaminophen  1 tablet Oral Once    sodium chloride flush  5-40 mL IntraVENous 2 times per day    cefTRIAXone (ROCEPHIN) IV  1,000 mg IntraVENous Q24H    famotidine (PEPCID) injection  20 mg IntraVENous BID     Continuous Infusions:   sodium chloride      sodium chloride      heparin (PORCINE) Infusion 8 Units/kg/hr (10/01/21 0018)     PRN Meds:sodium chloride flush, sodium chloride, perflutren lipid microspheres, sodium chloride, sodium chloride flush, polyethylene glycol, acetaminophen **OR** acetaminophen    Objective:   Vitals:   T-max:  Patient Vitals for the past 8 hrs:   BP Temp Temp src Pulse Resp SpO2 Weight   10/02/21 0753 127/66 98.5 °F (36.9 °C) Axillary 94 18 96 % --   10/02/21 0713 -- -- -- -- -- -- 282 lb 3 oz (128 kg)   10/02/21 0510 (!) 99/58 97.5 °F (36.4 °C) Axillary 94 21 94 % --   10/02/21 0210 (!) 108/59 97.9 °F (36.6 °C) Oral 104 22 93 % --       Intake/Output Summary (Last 24 hours) at 10/2/2021 0957  Last data filed at 10/2/2021 0950  Gross per 24 hour   Intake 2397.65 ml   Output 150 ml   Net 2247.65 ml       Review of Systems   Constitutional: Negative for chills and fever. HENT: Negative for congestion and sore throat. Eyes: Negative for visual disturbance. Respiratory: Negative for cough, chest tightness and shortness of breath. Cardiovascular: Negative for chest pain, palpitations and leg swelling. Gastrointestinal: Negative for abdominal distention, abdominal pain, constipation, diarrhea, nausea and vomiting. Genitourinary: Negative for difficulty urinating, dysuria, frequency and urgency. Musculoskeletal: Negative for back pain. Skin: Negative for rash and wound. Neurological: Negative for dizziness, weakness, light-headedness, numbness and headaches. Psychiatric/Behavioral: The patient is not nervous/anxious. Physical Exam  Constitutional:       General: She is not in acute distress. Appearance: She is obese. HENT:      Head: Normocephalic and atraumatic. Mouth/Throat:      Mouth: Mucous membranes are moist.      Pharynx: No oropharyngeal exudate or posterior oropharyngeal erythema. Eyes:      General: No scleral icterus. Conjunctiva/sclera: Conjunctivae normal.   Cardiovascular:      Rate and Rhythm: Regular rhythm. Tachycardia present. Pulses: Normal pulses. Heart sounds: No murmur heard. No gallop. Pulmonary:      Effort: Pulmonary effort is normal. No respiratory distress. Breath sounds: Normal breath sounds. No wheezing or rales. Abdominal:      General: Bowel sounds are normal. There is no distension. Palpations: Abdomen is soft. Tenderness: There is no abdominal tenderness. There is no guarding. Musculoskeletal:         General: No swelling or tenderness. Cervical back: Neck supple. No tenderness. Right lower leg: No edema.       Left lower leg: right hemidiaphragm with borderline enlarged heart. 3. Left PICC line appreciated with the tip at the atrial caval junction               IR ANGIOGRAM PULMONARY BILATERAL   Final Result      1. Satisfactory bilateral selective pulmonary artery lower lobe catheterization with EKOS TPA ultrasound catheter assisted delivery of TPA as described. 2. Rate: 6 hour infusion with 1 mg/h through each catheter has been scheduled         CT CHEST PULMONARY EMBOLISM W CONTRAST   Final Result      Massive pulmonary emboli with elevated RV/LV ratio consistent with right heart strain. .      Mild basilar atelectatic changes. Results called to Dr. Carmelo Olson at 12:58 PM on 9/30/2021. Assessment/Plan:     Deon Hartmann a 78 y. o.female with a PMHx TERRANCE, HTN, MUNIZ w esophageal varices, obesity p/w chest pain 2/2 PE.      Massive Pulmonary Embolism with RV strain  Pt had 4 days of abdominal pain which developed into SOB and sudden onset CP which prompted her to come to the ED. Never had a blood clot before. CT chest pulmonary artery showed massive pulmonary emboli with elevated RV/LV ratio consistent with right heart strain. EKOS inserted by IR. tPa was administered to bilateral PA x 6 hrs. Trops and lactic acid trended down. - Continue Heparin gtt   - Anti-Xa q6h  - Monitor PT/INR  - Echo pending   - COVID negative  - Will have discussion with patient for eliquis vs coumadin tomorrow   - Will need outpatient hypercoagulable workup if COVID negative  - Discussed with Lawrance Serve about patient's case, can be reached at [phone]    Uncomplicated UTI  UA showed cloudy urine with small leuk esterase, >100 WBC, 4+ bacteria. Patient asymptomatic. E coli grew on cultures. - F/U culture sensitivities  - On rocephin (day 2)    Acute blood loss anemia   Likely 2/2 bleeding from catheter sites with heparin ggt. Hgb dropping, likely not from acute bleed but representing actual loss during bleed yesterday.    - Continue to monitor CBC  - Monitor PT/INR  - Repeat H/H today  - Cannot transfuse given patient is Jehovah Witness  - Continue iron infusion   Monitor urine for hematuria     Hypokalemia   Pt had K levels of 2.8 on admission. 3.4 today. - Replace as needed  - Replete mag  - Monitor daily RFP    MEREDITH (resolved)  Mixed picture. Likely 2/2 hypovolemia and contrast induced. - Avoid nephrotoxic agents and contrast  - Monitor I/Os    TERRANCE on CPAP  Patient only wants to use her own machine and will get her daughter to bring hers in.     HTN  Patient takes lisinopril at home. Hypotensive currently. - Continue to hold     MUNIZ  With Hx of esophageal varicies. Morbidly obese. Denies hx of alcohol use. - Continue pepcid  -  on weight loss       Code Status: Limited  FEN: ADULT DIET;  Regular  PPX: Heparin ggt  DISPO: Alex Bar MD, PGY-1  10/02/21  9:57 AM    This patient has been staffed and discussed with Val Hoffman MD.

## 2021-10-02 NOTE — PROGRESS NOTES
BMP:  Lab Results   Component Value Date     10/02/2021    K 3.4 10/02/2021    K 3.5 09/30/2021     10/02/2021    CO2 25 10/02/2021    BUN 27 10/02/2021    CREATININE 1.0 10/02/2021    GLUCOSE 107 10/02/2021     INR: No results found for: INR     CARDIAC LABS  ENZYMES:  Recent Labs     09/30/21  1733 10/01/21  0357 10/01/21  1105   TROPONINI 0.06* 0.05* 0.05*     FASTING LIPID PANEL:  Lab Results   Component Value Date    HDL 51 07/29/2019    LDLCALC 100 07/29/2019    TRIG 65 07/29/2019    TSH 1.46 12/13/2018     LIVER PROFILE:  Lab Results   Component Value Date    AST 42 09/30/2021    AST 25 07/29/2019    ALT 22 09/30/2021    ALT 19 07/29/2019       -----------------------------------------------------------------  Telemetry: Personally reviewed  ST    Objective:   Vitals: /67   Pulse 100   Temp 97.9 °F (36.6 °C) (Oral)   Resp 18   Ht 5' 2\" (1.575 m)   Wt 282 lb 3 oz (128 kg)   SpO2 92%   BMI 51.61 kg/m²   General appearance: alert, appears stated age and cooperative, No acute distress   Eyes: Conjunctiva and pupils normal and reactive  Skin: Skin color, texture, turgor normal. No rashes or ecchymosis.   Neck: no JVD, supple, symmetrical, trachea midline   Lungs: , no accessory muscle use, no respiratory distress  Heart: RR, tachy  Abdomen: soft, non-tender; bowel sounds normal  Extremities: No edema, DP +  Psychiatric: normal insight and affect    Patient Active Problem List:     Chronic pain of left knee     Left ankle swelling     Class 2 obesity with alveolar hypoventilation without serious comorbidity with body mass index (BMI) of 37.0 to 37.9 in adult (HCC)     Fatigue     Fatty liver disease, nonalcoholic     Essential hypertension     Hiatal hernia with GERD     Idiopathic esophageal varices without bleeding (HCC)     Gastric polyp     Obstructive sleep apnea (adult) (pediatric)     Chest pain of uncertain etiology     Hypoxia     Abnormal EKG     Chest pain        Assessment & Plan: 1. CP  2. PE    79 y/o woman with a h/o HTN, TERRANCE, morbid obesity, MUNIZ, esophageal varices, who p/w CP, code STEMI called, s/p LHC that showed no CAD, R heart strain concerning for PE, CT scan showed massive PE, on hep gtt. CP  - No further s/s  - LHC - normal coronaries    PE  - S/p EKOS w/ tPA  - On hep gtt  - Pulmonary following  - Consider 2 week Zio on d/c    HTN  - Hypotensive at times  - Currently on no meds - Hyzaar at home  - Keep K+ > 4.0 and Mg > 2.0 - replacement ordered      Celesta Vani CNP  [address]      I  have spent 35 minutes in care of the patient including direct face to face time, chart preparation, reviewing diagnostic testing, other provider notes and coordinating patient care.

## 2021-10-02 NOTE — PROGRESS NOTES
effort is normal.      Breath sounds: Normal breath sounds. Abdominal:      General: Bowel sounds are normal.      Palpations: Abdomen is soft. Musculoskeletal:      Cervical back: Normal range of motion and neck supple. Skin:     General: Skin is warm. Neurological:      General: No focal deficit present. Mental Status: She is alert and oriented to person, place, and time. Psychiatric:         Mood and Affect: Mood normal.         Behavior: Behavior normal.         Thought Content: Thought content normal.         Judgment: Judgment normal.           LABS:    CBC:   Recent Labs     10/01/21  1805 10/02/21  0250 10/02/21  0609   WBC 14.0* 13.7* 12.7*   HGB 10.3* 11.2* 9.6*   HCT 31.0* 31.9* 28.9*   * 153 148   MCV 96.2 94.2 96.4     Renal:    Recent Labs     09/30/21  1733 10/01/21  0357 10/02/21  0609    143 142   K 3.5  3.5 4.0 3.4*    105 107   CO2 24 22 25   BUN 33* 37* 27*   CREATININE 1.1 1.3* 1.0   GLUCOSE 93 117* 107*   CALCIUM 8.6 7.7* 7.7*   MG 2.80* 2.50* 2.60*   ANIONGAP 18* 16 10     Hepatic:   Recent Labs     09/30/21  1733   AST 42*   ALT 22   BILITOT 1.5*   PROT 7.2   LABALBU 3.3*   ALKPHOS 67     Troponin:   Recent Labs     09/30/21  1733 10/01/21  0357 10/01/21  1105   TROPONINI 0.06* 0.05* 0.05*       -----------------------------------------------------------------  RAD:   XR CHEST PORTABLE   Final Result      1. No acute process or consolidation   2. Elevated right hemidiaphragm with borderline enlarged heart. 3. Left PICC line appreciated with the tip at the atrial caval junction               IR ANGIOGRAM PULMONARY BILATERAL   Final Result      1. Satisfactory bilateral selective pulmonary artery lower lobe catheterization with EKOS TPA ultrasound catheter assisted delivery of TPA as described.    2. Rate: 6 hour infusion with 1 mg/h through each catheter has been scheduled         CT CHEST PULMONARY EMBOLISM W CONTRAST   Final Result      Massive pulmonary

## 2021-10-02 NOTE — SIGNIFICANT EVENT
Repeat HgB is 9. No active bleeding. /64 and P 98, which is close ot what it has been during hospitalization. She is on GI prophylaxis but has a Hx of EGD 2018 with a single esophageal varix    She had submassive PE but on heparin gtt her HgB has trended down. She received one dose of Elqiuis this afternoon. Pt is a Sallye Ahr witness and will not accept whole blood products. I think it is unsafe to continue anticoagulation given the possibility of blood loss anemia. I have stopped Eliquis and will recheck HgB in AM. This is a challenging position to be in     Case discussed with Dr. Jae Seymour and he will call Patient and Chris Womack witness liason to discuss plan.     Joan Posadas MD

## 2021-10-03 LAB
ANION GAP SERPL CALCULATED.3IONS-SCNC: 10 MMOL/L (ref 3–16)
BUN BLDV-MCNC: 17 MG/DL (ref 7–20)
CALCIUM SERPL-MCNC: 7.8 MG/DL (ref 8.3–10.6)
CHLORIDE BLD-SCNC: 108 MMOL/L (ref 99–110)
CO2: 23 MMOL/L (ref 21–32)
CREAT SERPL-MCNC: 0.8 MG/DL (ref 0.6–1.2)
FOLATE: >20 NG/ML (ref 4.78–24.2)
GFR AFRICAN AMERICAN: >60
GFR NON-AFRICAN AMERICAN: >60
GLUCOSE BLD-MCNC: 91 MG/DL (ref 70–99)
HCT VFR BLD CALC: 26.1 % (ref 36–48)
HCT VFR BLD CALC: 27.3 % (ref 36–48)
HEMOGLOBIN: 9 G/DL (ref 12–16)
HEMOGLOBIN: 9.2 G/DL (ref 12–16)
MAGNESIUM: 2.7 MG/DL (ref 1.8–2.4)
ORGANISM: ABNORMAL
ORGANISM: ABNORMAL
POTASSIUM SERPL-SCNC: 3.4 MMOL/L (ref 3.5–5.1)
SODIUM BLD-SCNC: 141 MMOL/L (ref 136–145)
URINE CULTURE, ROUTINE: ABNORMAL
VITAMIN B-12: 612 PG/ML (ref 211–911)

## 2021-10-03 PROCEDURE — 2060000000 HC ICU INTERMEDIATE R&B

## 2021-10-03 PROCEDURE — 99232 SBSQ HOSP IP/OBS MODERATE 35: CPT | Performed by: NURSE PRACTITIONER

## 2021-10-03 PROCEDURE — 94660 CPAP INITIATION&MGMT: CPT

## 2021-10-03 PROCEDURE — 83735 ASSAY OF MAGNESIUM: CPT

## 2021-10-03 PROCEDURE — 2580000003 HC RX 258: Performed by: INTERNAL MEDICINE

## 2021-10-03 PROCEDURE — 6360000002 HC RX W HCPCS: Performed by: INTERNAL MEDICINE

## 2021-10-03 PROCEDURE — 85014 HEMATOCRIT: CPT

## 2021-10-03 PROCEDURE — 2580000003 HC RX 258: Performed by: STUDENT IN AN ORGANIZED HEALTH CARE EDUCATION/TRAINING PROGRAM

## 2021-10-03 PROCEDURE — 6370000000 HC RX 637 (ALT 250 FOR IP): Performed by: STUDENT IN AN ORGANIZED HEALTH CARE EDUCATION/TRAINING PROGRAM

## 2021-10-03 PROCEDURE — 80048 BASIC METABOLIC PNL TOTAL CA: CPT

## 2021-10-03 PROCEDURE — 36415 COLL VENOUS BLD VENIPUNCTURE: CPT

## 2021-10-03 PROCEDURE — 99233 SBSQ HOSP IP/OBS HIGH 50: CPT | Performed by: INTERNAL MEDICINE

## 2021-10-03 PROCEDURE — 6370000000 HC RX 637 (ALT 250 FOR IP): Performed by: INTERNAL MEDICINE

## 2021-10-03 PROCEDURE — 85018 HEMOGLOBIN: CPT

## 2021-10-03 RX ORDER — POTASSIUM CHLORIDE 20 MEQ/1
20 TABLET, EXTENDED RELEASE ORAL ONCE
Status: COMPLETED | OUTPATIENT
Start: 2021-10-03 | End: 2021-10-03

## 2021-10-03 RX ADMIN — POTASSIUM CHLORIDE 20 MEQ: 1500 TABLET, EXTENDED RELEASE ORAL at 09:10

## 2021-10-03 RX ADMIN — Medication 10 ML: at 21:32

## 2021-10-03 RX ADMIN — CEFTRIAXONE 1000 MG: 1 INJECTION, POWDER, FOR SOLUTION INTRAMUSCULAR; INTRAVENOUS at 16:20

## 2021-10-03 RX ADMIN — FAMOTIDINE 20 MG: 20 TABLET, FILM COATED ORAL at 21:31

## 2021-10-03 RX ADMIN — Medication 10 ML: at 09:18

## 2021-10-03 RX ADMIN — FAMOTIDINE 20 MG: 20 TABLET, FILM COATED ORAL at 09:10

## 2021-10-03 RX ADMIN — IRON SUCROSE 200 MG: 20 INJECTION, SOLUTION INTRAVENOUS at 09:18

## 2021-10-03 ASSESSMENT — PAIN SCALES - GENERAL
PAINLEVEL_OUTOF10: 0

## 2021-10-03 NOTE — PROGRESS NOTES
I discussed with the case with Mr. Tori Berrios, hemoglobin at 5 PM resulted as 9.0, dropped from hemoglobin this morning 9.6. Blood pressure appears stable, heart rate in 90s to 100s. No apparent signs of bleeding. I discussed with Dr. Saniya Perez, he does have a history of varices from underlying Boss cirrhosis. Елена Martel case is very challenging, she is high risk of bleeding, at the same time we have to respect her wishes of no transfusion of blood products. For now we will hold off further anticoagulation, nightly dose of Eliquis has been discontinued. We will monitor her hemoglobin, ideally take blood in pediatric tubes prevent further blood loss. I will give another dose of Venofer tomorrow morning. I will ask hematology for evaluation and recommendations for anticoagulation if needed. In my opinion best anticoagulation for her case is Coumadin because at least we have vitamin K that we can begin be given for reversal although not immediate. I will get a venous duplex of the lower extremities, if she does have evidence of DVT she will likely need IVC filter.     Below documents sent by  Tish Shavonne, what she can receive highlighted in yellow            Zeb Richard MD  Hospitalist  Attending Physician

## 2021-10-03 NOTE — PLAN OF CARE
Problem: Falls - Risk of:  Goal: Will remain free from falls  Description: Will remain free from falls  Outcome: Met This Shift  High fall risk. Fall precautions in place. Patient calls out appropriately. No falls this shift. Problem: Skin Integrity:  Goal: Will show no infection signs and symptoms  Description: Will show no infection signs and symptoms  Outcome: Met This Shift   Patient turned and check and changed as needed. No new skin issues or infections.

## 2021-10-03 NOTE — CONSULTS
product  transfusions. FAMILY HISTORY:  Noncontributory for current illness. REVIEW OF SYSTEMS:  She reports mild shortness of breath even at rest,  although much improved compared to just a few days ago. The remainder  of her 10-point review of systems is negative. MEDICATIONS:  Reviewed in Epic. ALLERGIES:  No known drug allergies. PHYSICAL EXAMINATION:  GENERAL:  She is an obese woman, alert, oriented, and in no acute  distress. VITAL SIGNS:  Currently blood pressure 127/56, heart rate of 100,  respiratory rate of 22, T-max is 98.2, saturating at 91% on room air. HEENT:  Pupils are equal and reactive. Oral mucosa is intact. RESPIRATORY:  Breath sounds present bilaterally though faint and  distant. CARDIOVASCULAR:  Tachycardic and regular. ABDOMEN:  Obese, nondistended, difficult to appreciate  hepatosplenomegaly or masses due to body habitus. SKIN:  No rash or dermatitis. She does have a large hematoma involving  the right groin that extends down into the buttocks area. LABORATORY DATA:  As noted above plus a chemistry that has a potassium  of 3.4 and calcium of 7.8. ASSESSMENT AND PLAN:  A 58-year-old woman with multiple underlying  medical problems with recent massive pulmonary emboli, post EKOS/TPA,  heparin and one dose of Eliquis. Unfortunately, her hemoglobin has  trended downward from 14 to current levels of 9 and suggestive of an  acute bleed most likely within the right groin area, but cannot exclude  the possibility of esophageal variceal bleed. In any case given the  significant drop in hemoglobin, I would agree with not continuing  anticoagulation, but continue to monitor expectantly. She is receiving  IV iron replacement, but once again we affirmed her commitment to not  receive PRBC transfusions. If hemoglobin stabilizes and no active source of bleeding is identified,  we would recommend restarting anticoagulation with Eliquis as previously  prescribed.   She will need very close monitoring of hemoglobin, so that  the anticoagulation can be held if necessary. We would also recommend drawing blood in PD tubes to minimize blood loss  from that. The above discussed with the patient, Medicine Team and Pulmonary. Thank you for this consult. Please do not hesitate to contact me for  questions or concerns regarding the patient's evaluation.         Maty Gonzalse MD    D: 10/03/2021 12:11:41       T: 10/03/2021 14:17:28     [email or web address]  Job#: [id number]     Doc#: [id number]    CC:

## 2021-10-03 NOTE — PROGRESS NOTES
Pulmonary progress Note    Admit Date: 9/30/2021  Day: 3  IV Access:Peripheral  IV Fluids:None  Vasopressors:None                Antibiotics: None  Diet: ADULT DIET; Regular    CC: SOB     Interval history: HgB dropped to 9 today. Was 14 on admission. No nausea, vomiting or abdominal pain. She is unable to tell me if she has hematochezia or melena. She doesn't recall much about EGD in 2018 where she had a varic found. Medications:     Scheduled Meds:   influenza virus vaccine  0.5 mL IntraMUSCular Prior to discharge    famotidine  20 mg Oral BID    oxyCODONE-acetaminophen  1 tablet Oral Once    sodium chloride flush  5-40 mL IntraVENous 2 times per day    cefTRIAXone (ROCEPHIN) IV  1,000 mg IntraVENous Q24H     Continuous Infusions:   sodium chloride      sodium chloride       PRN Meds:sodium chloride flush, sodium chloride, sodium chloride, sodium chloride flush, polyethylene glycol, acetaminophen **OR** acetaminophen    Objective:   Vitals:   T-max:  Patient Vitals for the past 8 hrs:   BP Temp Temp src Pulse Resp   10/03/21 1116 (!) 127/56 98.3 °F (36.8 °C) Oral 100 22       Intake/Output Summary (Last 24 hours) at 10/3/2021 1538  Last data filed at 10/3/2021 1300  Gross per 24 hour   Intake 220 ml   Output 400 ml   Net -180 ml         Physical Exam  Constitutional:       Appearance: She is obese. HENT:      Head: Normocephalic and atraumatic. Nose: Nose normal.   Eyes:      Extraocular Movements: Extraocular movements intact. Conjunctiva/sclera: Conjunctivae normal.      Pupils: Pupils are equal, round, and reactive to light. Cardiovascular:      Rate and Rhythm: Normal rate and regular rhythm. Pulses: Normal pulses. Heart sounds: Normal heart sounds. Pulmonary:      Effort: Pulmonary effort is normal.      Breath sounds: Normal breath sounds. Abdominal:      General: Bowel sounds are normal.      Palpations: Abdomen is soft.    Musculoskeletal:      Cervical back: Normal range of motion and neck supple. Skin:     General: Skin is warm. Neurological:      General: No focal deficit present. Mental Status: She is alert and oriented to person, place, and time. Psychiatric:         Mood and Affect: Mood normal.         Behavior: Behavior normal.         Thought Content: Thought content normal.         Judgment: Judgment normal.           LABS:    CBC:   Recent Labs     10/01/21  1805 10/01/21  1805 10/02/21  0250 10/02/21  0250 10/02/21  0609 10/02/21  1615 10/03/21  0543   WBC 14.0*  --  13.7*  --  12.7*  --   --    HGB 10.3*   < > 11.2*   < > 9.6* 9.0* 9.0*   HCT 31.0*   < > 31.9*   < > 28.9* 27.0* 26.1*   *  --  153  --  148  --   --    MCV 96.2  --  94.2  --  96.4  --   --     < > = values in this interval not displayed. Renal:    Recent Labs     10/01/21  0357 10/02/21  0609 10/03/21  0543    142 141   K 4.0 3.4* 3.4*    107 108   CO2 22 25 23   BUN 37* 27* 17   CREATININE 1.3* 1.0 0.8   GLUCOSE 117* 107* 91   CALCIUM 7.7* 7.7* 7.8*   MG 2.50* 2.60* 2.70*   ANIONGAP 16 10 10     Hepatic:   Recent Labs     09/30/21  1733   AST 42*   ALT 22   BILITOT 1.5*   PROT 7.2   LABALBU 3.3*   ALKPHOS 67     Troponin:   Recent Labs     09/30/21  1733 10/01/21  0357 10/01/21  1105   TROPONINI 0.06* 0.05* 0.05*       -----------------------------------------------------------------  RAD:   XR CHEST PORTABLE   Final Result      1. No acute process or consolidation   2. Elevated right hemidiaphragm with borderline enlarged heart. 3. Left PICC line appreciated with the tip at the atrial caval junction               IR ANGIOGRAM PULMONARY BILATERAL   Final Result      1. Satisfactory bilateral selective pulmonary artery lower lobe catheterization with EKOS TPA ultrasound catheter assisted delivery of TPA as described.    2. Rate: 6 hour infusion with 1 mg/h through each catheter has been scheduled         CT CHEST PULMONARY EMBOLISM W CONTRAST   Final Result Massive pulmonary emboli with elevated RV/LV ratio consistent with right heart strain. .      Mild basilar atelectatic changes. Results called to Dr. Violeta Cuevas at 12:58 PM on 9/30/2021. VL Extremity Venous Bilateral    (Results Pending)     Echo: Pending    Assessment/Plan:     Cherry Villar is a 78 y.o. female, who had p/w 4 days of shortness of breath with right leg pain.      Submassive Pulmonary Embolism with RV strain 2/2 DVT   Pt had 4 days of abdominal pain that was vague in nature which developed into SOB and mild chest pain that occurred suddenly which prompted her to come to the ED. Pt denied any smoking history, last mammogram 2 years ago was negative, not vaccinated for COVID. Pt lost taste and smell the last 2 weeks but has not had exposure to any known COVID people. Never had a blood clot before. ProBNP 5150 with EKG showing mild ST elevations in the anterior leads. Cath lab was negative for any ischemia in coronary arteries. CT chest pulmonary artery showed massive pulmonary emboli with elevated RV/LV ratio consistent with right heart strain.      -LHC clear, EF 55%   - s/p EKOS   -Off anti-coag for concern of bleeding given drop of HgB by 5gm  - echo performed 10/2 but not read  -SARS-CoV-2 PCR negative  - Doppler B LE and IVC filter of clots seen    Blood loss anemia   - Follow HgB  - Venofer given  - Hematology following  - GI consult in AM    TERRANCE   -Patient on CPAP at home     HTN  -Patient takes lisinopril at home- currently held as bp are soft.      MUNIZ  - w/ Hx of esophageal varicies. Cont pepcid. Off Anti-coag.  Will consult GI in AM to evaluate GI tract for bleed     Pt is Jehovahs Witness and will not take whole blood products making continued anticoagulation very diificult    Code Status:limited   FEN: regular diet   PPX: Pepcid  DISPO: Tele       Yamile Fuller MD

## 2021-10-03 NOTE — PROGRESS NOTES
Progress Note    Admit Date: 9/30/2021  Day: 3  Diet: ADULT DIET; Regular    CC: Chest pain    Interval history:   NAEON. No active complaints this am. CBC/ RFP stable. No signs of bleeding from anywhere. She had a short episode of nose bleed likely from dryness that resolved spontaneously. After discussion with Heme/Onc we will keep her off blood thinners for now and monitor her H/H till tomorrow to make sure it is stable after which a discussion will be had with the pt in terms of the risks and benefits of AC. LE doppler pending, Echo penidnig    HPI:   Kylah Yost a 78 y. o.female with a PMHx TERRANCE, HTN, MUNIZ w esophageal varices, obesity p/w chest pain. Pt had a code STEMI called for her chest pain which was found negative in the cath lab for any occlusion but right heart strain was seen concerning for massive PE. Chest pain started suddenly and occurred 4 hours prior to her coming in. Pt had some SOB attributed with it but it did radiate. In the field an ekg with subtle st elevation was seen.  Pt denies any history of smoking.      Pt had a EKOS placed for TPA.     Medications:     Scheduled Meds:   influenza virus vaccine  0.5 mL IntraMUSCular Prior to discharge    famotidine  20 mg Oral BID    oxyCODONE-acetaminophen  1 tablet Oral Once    sodium chloride flush  5-40 mL IntraVENous 2 times per day    cefTRIAXone (ROCEPHIN) IV  1,000 mg IntraVENous Q24H     Continuous Infusions:   sodium chloride      sodium chloride       PRN Meds:sodium chloride flush, sodium chloride, sodium chloride, sodium chloride flush, polyethylene glycol, acetaminophen **OR** acetaminophen    Objective:   Vitals:   T-max:  Patient Vitals for the past 8 hrs:   BP Temp Temp src Pulse Resp SpO2 Weight   10/03/21 1116 (!) 127/56 98.3 °F (36.8 °C) Oral 100 22 -- --   10/03/21 0734 118/64 98.4 °F (36.9 °C) Oral 88 18 91 % --   10/03/21 0600 -- -- -- -- -- -- 279 lb 12.2 oz (126.9 kg)       Intake/Output Summary (Last 24 hours) at < > 9.6* 9.0* 9.0*   HCT 31.0*   < > 31.9*   < > 28.9* 27.0* 26.1*   *  --  153  --  148  --   --    MCV 96.2  --  94.2  --  96.4  --   --     < > = values in this interval not displayed. Renal:    Recent Labs     10/01/21  0357 10/02/21  0609 10/03/21  0543    142 141   K 4.0 3.4* 3.4*    107 108   CO2 22 25 23   BUN 37* 27* 17   CREATININE 1.3* 1.0 0.8   GLUCOSE 117* 107* 91   CALCIUM 7.7* 7.7* 7.8*   MG 2.50* 2.60* 2.70*   ANIONGAP 16 10 10     Hepatic:   Recent Labs     09/30/21  1733   AST 42*   ALT 22   BILITOT 1.5*   PROT 7.2   LABALBU 3.3*   ALKPHOS 67     Troponin:   Recent Labs     09/30/21  1733 10/01/21  0357 10/01/21  1105   TROPONINI 0.06* 0.05* 0.05*     BNP: No results for input(s): BNP in the last 72 hours. Lipids: No results for input(s): CHOL, HDL in the last 72 hours. Invalid input(s): LDLCALCU, TRIGLYCERIDE  ABGs:  No results for input(s): PHART, LIQ7YMW, PO2ART, XBV6VUT, BEART, THGBART, Y9BJMVVO, YDM0ANT in the last 72 hours. INR: No results for input(s): INR in the last 72 hours. Lactate: No results for input(s): LACTATE in the last 72 hours. Cultures:  -----------------------------------------------------------------  RAD:   XR CHEST PORTABLE   Final Result      1. No acute process or consolidation   2. Elevated right hemidiaphragm with borderline enlarged heart. 3. Left PICC line appreciated with the tip at the atrial caval junction               IR ANGIOGRAM PULMONARY BILATERAL   Final Result      1. Satisfactory bilateral selective pulmonary artery lower lobe catheterization with EKOS TPA ultrasound catheter assisted delivery of TPA as described. 2. Rate: 6 hour infusion with 1 mg/h through each catheter has been scheduled         CT CHEST PULMONARY EMBOLISM W CONTRAST   Final Result      Massive pulmonary emboli with elevated RV/LV ratio consistent with right heart strain. .      Mild basilar atelectatic changes.       Results called to Dr. Viola Neri at 12:58 PM on 9/30/2021. VL Extremity Venous Bilateral    (Results Pending)       Assessment/Plan:     Nicholas Toscano a 78 y. o.female with a PMHx TERRANCE, HTN, MUNIZ w esophageal varices, obesity p/w chest pain 2/2 PE.      Submassive Pulmonary Embolism with RV strain  Pt had 4 days of abdominal pain which developed into SOB and sudden onset CP which prompted her to come to the ED. Never had a blood clot before. CT chest pulmonary artery showed massive pulmonary emboli with elevated RV/LV ratio consistent with right heart strain. EKOS inserted by IR. tPa was administered to bilateral PA x 6 hrs. - Echo pending   - COVID negative  - After discussion with Heme/Onc (input appreciated) we will keep her off blood thinners for now and monitor her H/H till tomorrow to make sure it is stable after which a discussion will be had with the pt in terms of the risks and benefits of AC.  - Discussed with Sanket Dill about patient's case, can be reached at [phone], ok to get warfarin if needed but will likely need heparin bridge first.  -LE doppler pending    Uncomplicated UTI  UA showed cloudy urine with small leuk esterase, >100 WBC, 4+ bacteria. E coli grew on cultures. - On rocephin (day 3)    Acute blood loss anemia (resolved)  Likely 2/2 bleeding from catheter sites with heparin ggt. -Blood thinners stopped  - Continue to monitor H/H q12h (next at 6 pm), goal > 7, last one 9.0   - Cannot transfuse given patient is Jehov[employer] Witness  - Continue iron infusion  -If pt becomes hypotensive/tachycardic consider progression of PE or GI bleed as she has MUNIZ cirrhosis w esophageal varix    MEREDITH (resolved)  Mixed picture. Likely 2/2 hypovolemia and contrast induced. - Avoid nephrotoxic agents and contrast  - Monitor I/Os    TERRANCE on CPAP  -Continue CPAP machine       MUNIZ  With Hx of esophageal varicies. Morbidly obese. Denies hx of alcohol use.   - Continue pepcid  -  on weight loss       Code Status: Limited  FEN: ADULT DIET; Regular  PPX: SCD  DISPO: Beatris Orantes MD, PGY-2  10/03/21  1:11 PM    This patient has been staffed and discussed with Sofia Angel MD.

## 2021-10-04 ENCOUNTER — APPOINTMENT (OUTPATIENT)
Dept: VASCULAR LAB | Age: 66
DRG: 167 | End: 2021-10-04
Attending: INTERNAL MEDICINE
Payer: COMMERCIAL

## 2021-10-04 ENCOUNTER — APPOINTMENT (OUTPATIENT)
Dept: INTERVENTIONAL RADIOLOGY/VASCULAR | Age: 66
DRG: 167 | End: 2021-10-04
Attending: INTERNAL MEDICINE
Payer: COMMERCIAL

## 2021-10-04 LAB
GLUCOSE BLD-MCNC: 97 MG/DL (ref 70–99)
HCT VFR BLD CALC: 29.6 % (ref 36–48)
HCT VFR BLD CALC: 29.7 % (ref 36–48)
HEMOGLOBIN: 10 G/DL (ref 12–16)
HEMOGLOBIN: 10.2 G/DL (ref 12–16)
INR BLD: 1.09 (ref 0.88–1.12)
MAGNESIUM: 2.3 MG/DL (ref 1.8–2.4)
PERFORMED ON: NORMAL
PROTHROMBIN TIME: 12.4 SEC (ref 9.9–12.7)
REASON FOR REJECTION: NORMAL
REASON FOR REJECTION: NORMAL
REJECTED TEST: NORMAL
REJECTED TEST: NORMAL

## 2021-10-04 PROCEDURE — C1894 INTRO/SHEATH, NON-LASER: HCPCS

## 2021-10-04 PROCEDURE — 94660 CPAP INITIATION&MGMT: CPT

## 2021-10-04 PROCEDURE — 85610 PROTHROMBIN TIME: CPT

## 2021-10-04 PROCEDURE — 2580000003 HC RX 258: Performed by: STUDENT IN AN ORGANIZED HEALTH CARE EDUCATION/TRAINING PROGRAM

## 2021-10-04 PROCEDURE — C1880 VENA CAVA FILTER: HCPCS

## 2021-10-04 PROCEDURE — 83735 ASSAY OF MAGNESIUM: CPT

## 2021-10-04 PROCEDURE — 6360000004 HC RX CONTRAST MEDICATION: Performed by: RADIOLOGY

## 2021-10-04 PROCEDURE — 2580000003 HC RX 258: Performed by: INTERNAL MEDICINE

## 2021-10-04 PROCEDURE — 6360000002 HC RX W HCPCS

## 2021-10-04 PROCEDURE — 6370000000 HC RX 637 (ALT 250 FOR IP): Performed by: INTERNAL MEDICINE

## 2021-10-04 PROCEDURE — 85018 HEMOGLOBIN: CPT

## 2021-10-04 PROCEDURE — 6360000002 HC RX W HCPCS: Performed by: INTERNAL MEDICINE

## 2021-10-04 PROCEDURE — C1769 GUIDE WIRE: HCPCS

## 2021-10-04 PROCEDURE — 99232 SBSQ HOSP IP/OBS MODERATE 35: CPT | Performed by: INTERNAL MEDICINE

## 2021-10-04 PROCEDURE — 37191 INS ENDOVAS VENA CAVA FILTR: CPT | Performed by: RADIOLOGY

## 2021-10-04 PROCEDURE — 06H03DZ INSERTION OF INTRALUMINAL DEVICE INTO INFERIOR VENA CAVA, PERCUTANEOUS APPROACH: ICD-10-PCS | Performed by: RADIOLOGY

## 2021-10-04 PROCEDURE — 85014 HEMATOCRIT: CPT

## 2021-10-04 PROCEDURE — 2709999900 HC NON-CHARGEABLE SUPPLY

## 2021-10-04 PROCEDURE — 2060000000 HC ICU INTERMEDIATE R&B

## 2021-10-04 PROCEDURE — 93970 EXTREMITY STUDY: CPT

## 2021-10-04 PROCEDURE — 99233 SBSQ HOSP IP/OBS HIGH 50: CPT | Performed by: INTERNAL MEDICINE

## 2021-10-04 PROCEDURE — 36415 COLL VENOUS BLD VENIPUNCTURE: CPT

## 2021-10-04 RX ADMIN — CEFTRIAXONE 1000 MG: 1 INJECTION, POWDER, FOR SOLUTION INTRAMUSCULAR; INTRAVENOUS at 15:30

## 2021-10-04 RX ADMIN — IOHEXOL 150 ML: 350 INJECTION, SOLUTION INTRAVENOUS at 13:52

## 2021-10-04 RX ADMIN — Medication 10 ML: at 08:22

## 2021-10-04 RX ADMIN — FAMOTIDINE 20 MG: 20 TABLET, FILM COATED ORAL at 08:22

## 2021-10-04 RX ADMIN — FAMOTIDINE 20 MG: 20 TABLET, FILM COATED ORAL at 20:13

## 2021-10-04 RX ADMIN — Medication 10 ML: at 20:13

## 2021-10-04 ASSESSMENT — PAIN SCALES - GENERAL
PAINLEVEL_OUTOF10: 0

## 2021-10-04 NOTE — PROGRESS NOTES
Patient was found to have DVT on LE dopplers. Discussion was had with patient about the need for IVC filter at this time. She was agreeable to procedure. [employer] liaison Bernie Weaver was contacted to update him on the plan for IVC filter. Attempt was made to contact patient's daughter Keven Notice on plan, but she did not answer her phone. No voicemail was left. Will attempt to call later.

## 2021-10-04 NOTE — PLAN OF CARE
Problem: Falls - Risk of:  Goal: Will remain free from falls  Description: Will remain free from falls  Outcome: Ongoing  Patient weak. Fall precautions in place. Purewick placed. Frequent check and changes. Problem: Skin Integrity:  Goal: Will show no infection signs and symptoms  Description: Will show no infection signs and symptoms  Outcome: Ongoing  Slight irritation from purewick. Dayshift lowered suction. Checking and changing more frequently. Problem: Pain:  Goal: Pain level will decrease  Description: Pain level will decrease  Outcome: Met This Shift  Complains of no pain this shift.

## 2021-10-04 NOTE — PROGRESS NOTES
Was informed by charge nurse that doppler was positive for DVT. Report has not posted yet. Notified Dr. Ngo Child via perfect serve. Awaiting response.

## 2021-10-04 NOTE — PROGRESS NOTES
Physical Therapy and Occupational Therapy  No Treatment    Referral received and chart reviewed. Attempted to see pt twice today. Pt off floor for testing in AM and then having IVC filter placed in PM.  Will follow up 10/5.     Devon Rowley, OTR/L, 1882

## 2021-10-04 NOTE — PROCEDURES
IR Brief Postoperative Note    Lopez Camargo  YOB: 1955  [id number]    Pre-operative Diagnosis: PE, unable to receive pharmacologic anticoagulation    Post-operative Diagnosis: Same    Procedure: ivc filter placement    Anesthesia: local    Surgeons/Assistants: rangel    Estimated Blood Loss: Minimal    Complications: none    Specimens: were not obtained    See full procedure dictation to follow      Tony Mishra MD MD  10/4/2021

## 2021-10-04 NOTE — PLAN OF CARE
Problem: Falls - Risk of:  Goal: Will remain free from falls  Description: Will remain free from falls  10/4/2021 1018 by Marisela Palacios RN  Outcome: Ongoing  Note: Standard fall precautions are in place. Call light and frequently used items are within pt's reach. Bed alarm is on. Patient is agreeable to call with needs. Problem: Discharge Planning:  Goal: Discharged to appropriate level of care  Description: Discharged to appropriate level of care  Note:  to assist with needs prn.

## 2021-10-04 NOTE — PROGRESS NOTES
Pulmonary progress Note    Admit Date: 9/30/2021  Day: 3  IV Access:Peripheral  IV Fluids:None  Vasopressors:None                Antibiotics: None  Diet: Diet NPO    CC: SOB     Interval history: Hemoglobin has stabilized at 10 this morning off of anticoagulation for approximately 24 hours. Doppler of lower extremities was performed that showed a DVT. IVC filter has been ordered    Medications:     Scheduled Meds:   influenza virus vaccine  0.5 mL IntraMUSCular Prior to discharge    famotidine  20 mg Oral BID    oxyCODONE-acetaminophen  1 tablet Oral Once    sodium chloride flush  5-40 mL IntraVENous 2 times per day    cefTRIAXone (ROCEPHIN) IV  1,000 mg IntraVENous Q24H     Continuous Infusions:   sodium chloride      sodium chloride       PRN Meds:sodium chloride flush, sodium chloride, sodium chloride, sodium chloride flush, polyethylene glycol, acetaminophen **OR** acetaminophen    Objective:   Vitals:   T-max:  Patient Vitals for the past 8 hrs:   BP Temp Temp src Pulse Resp SpO2 Weight   10/04/21 1138 135/69 97.9 °F (36.6 °C) Oral 90 20 95 % --   10/04/21 0814 135/69 98.3 °F (36.8 °C) Oral 89 18 94 % --   10/04/21 0600 -- -- -- -- -- -- 267 lb 10.2 oz (121.4 kg)       Intake/Output Summary (Last 24 hours) at 10/4/2021 1316  Last data filed at 10/4/2021 0945  Gross per 24 hour   Intake 370 ml   Output 0 ml   Net 370 ml         Physical Exam  Constitutional:       Appearance: She is obese. HENT:      Head: Normocephalic and atraumatic. Nose: Nose normal.   Eyes:      Extraocular Movements: Extraocular movements intact. Conjunctiva/sclera: Conjunctivae normal.      Pupils: Pupils are equal, round, and reactive to light. Cardiovascular:      Rate and Rhythm: Normal rate and regular rhythm. Pulses: Normal pulses. Heart sounds: Normal heart sounds. Pulmonary:      Effort: Pulmonary effort is normal.      Breath sounds: Normal breath sounds.    Abdominal:      General: Bowel sounds are normal.      Palpations: Abdomen is soft. Musculoskeletal:      Cervical back: Normal range of motion and neck supple. Skin:     General: Skin is warm. Neurological:      General: No focal deficit present. Mental Status: She is alert and oriented to person, place, and time. Psychiatric:         Mood and Affect: Mood normal.         Behavior: Behavior normal.         Thought Content: Thought content normal.         Judgment: Judgment normal.           LABS:    CBC:   Recent Labs     10/01/21  1805 10/01/21  1805 10/02/21  0250 10/02/21  0250 10/02/21  0609 10/02/21  1615 10/03/21  0543 10/03/21  1850 10/04/21  1109   WBC 14.0*  --  13.7*  --  12.7*  --   --   --   --    HGB 10.3*   < > 11.2*   < > 9.6*   < > 9.0* 9.2* 10.0*   HCT 31.0*   < > 31.9*   < > 28.9*   < > 26.1* 27.3* 29.6*   *  --  153  --  148  --   --   --   --    MCV 96.2  --  94.2  --  96.4  --   --   --   --     < > = values in this interval not displayed. Renal:    Recent Labs     10/02/21  0609 10/03/21  0543 10/04/21  0615    141  --    K 3.4* 3.4*  --     108  --    CO2 25 23  --    BUN 27* 17  --    CREATININE 1.0 0.8  --    GLUCOSE 107* 91  --    CALCIUM 7.7* 7.8*  --    MG 2.60* 2.70* 2.30   ANIONGAP 10 10  --      Hepatic:   No results for input(s): AST, ALT, BILITOT, BILIDIR, PROT, LABALBU, ALKPHOS in the last 72 hours. Troponin:   No results for input(s): TROPONINI in the last 72 hours. -----------------------------------------------------------------  RAD:   VL Extremity Venous Bilateral         XR CHEST PORTABLE   Final Result      1. No acute process or consolidation   2. Elevated right hemidiaphragm with borderline enlarged heart. 3. Left PICC line appreciated with the tip at the atrial caval junction               IR ANGIOGRAM PULMONARY BILATERAL   Final Result      1.  Satisfactory bilateral selective pulmonary artery lower lobe catheterization with EKOS TPA ultrasound catheter assisted questions    Pt is [employer] Witness and will not take whole blood products making continued anticoagulation very diificult    Code Status:limited   FEN: regular diet   PPX: Pepcid  DISPO: Mili Chow MD

## 2021-10-04 NOTE — PROGRESS NOTES
Progress Note    Admit Date: 9/30/2021  Day: 4  Diet: Diet NPO    CC: Chest pain    Interval history:   NAONE. Patient was seen and examined this am. H/H remains stable. Patient denies any bleeding from catheter sites and no signs of bleeding were noted on exam. She denies any CP, SOB, abdominal pain, c/d, urinary symptoms, weakness, numbness or lightheadedness. Tolerating diet. HDS. HPI:   Braulio Hemp a 78 y. o.female with a PMHx TERRANCE, HTN, MUNIZ w esophageal varices, obesity p/w chest pain. Pt had a code STEMI called for her chest pain which was found negative in the cath lab for any occlusion but right heart strain was seen concerning for massive PE. Chest pain started suddenly and occurred 4 hours prior to her coming in. Pt had some SOB attributed with it but it did radiate. In the field an ekg with subtle st elevation was seen.  Pt denies any history of smoking.      Pt had a EKOS placed for TPA.     Medications:     Scheduled Meds:   influenza virus vaccine  0.5 mL IntraMUSCular Prior to discharge    famotidine  20 mg Oral BID    oxyCODONE-acetaminophen  1 tablet Oral Once    sodium chloride flush  5-40 mL IntraVENous 2 times per day    cefTRIAXone (ROCEPHIN) IV  1,000 mg IntraVENous Q24H     Continuous Infusions:   sodium chloride      sodium chloride       PRN Meds:sodium chloride flush, sodium chloride, sodium chloride, sodium chloride flush, polyethylene glycol, acetaminophen **OR** acetaminophen    Objective:   Vitals:   T-max:  Patient Vitals for the past 8 hrs:   BP Temp Temp src Pulse Resp SpO2 Weight   10/04/21 1138 135/69 97.9 °F (36.6 °C) Oral 90 20 95 % --   10/04/21 0814 135/69 98.3 °F (36.8 °C) Oral 89 18 94 % --   10/04/21 0600 -- -- -- -- -- -- 267 lb 10.2 oz (121.4 kg)       Intake/Output Summary (Last 24 hours) at 10/4/2021 1300  Last data filed at 10/4/2021 0945  Gross per 24 hour   Intake 370 ml   Output 0 ml   Net 370 ml       Physical Exam  Constitutional:       General: She is not in acute distress. Appearance: She is obese. HENT:      Head: Normocephalic and atraumatic. Mouth/Throat:      Mouth: Mucous membranes are moist.      Pharynx: No oropharyngeal exudate or posterior oropharyngeal erythema. Eyes:      General: No scleral icterus. Conjunctiva/sclera: Conjunctivae normal.   Cardiovascular:      Rate and Rhythm: Regular rhythm. Normal rate. Pulses: Normal pulses. Heart sounds: No murmur heard. No gallop. Pulmonary:      Effort: Pulmonary effort is normal. No respiratory distress. Breath sounds: Normal breath sounds. No wheezing or rales. Abdominal:      General: Bowel sounds are normal. There is no distension. Palpations: Abdomen is soft. Tenderness: There is no abdominal tenderness. There is no guarding. Musculoskeletal:         General: No swelling or tenderness. Cervical back: Neck supple. No tenderness. Right lower leg: No edema. Left lower leg: No edema. Skin:     Capillary Refill: Capillary refill takes less than 2 seconds. Coloration: Skin is not jaundiced or pale. Findings: No rash. Neurological:      General: No focal deficit present. Mental Status: She is alert and oriented to person, place, and time. Cranial Nerves: No cranial nerve deficit. Sensory: No sensory deficit. Motor: No weakness. Coordination: Coordination normal.   Psychiatric:         Mood and Affect: Mood normal.         Behavior: Behavior normal.         Thought Content:  Thought content normal.         Judgment: Judgment normal.     LABS:    CBC:   Recent Labs     10/01/21  1805 10/01/21  1805 10/02/21  0250 10/02/21  0250 10/02/21  0609 10/02/21  1615 10/03/21  0543 10/03/21  1850 10/04/21  1109   WBC 14.0*  --  13.7*  --  12.7*  --   --   --   --    HGB 10.3*   < > 11.2*   < > 9.6*   < > 9.0* 9.2* 10.0*   HCT 31.0*   < > 31.9*   < > 28.9*   < > 26.1* 27.3* 29.6*   *  --  153  --  148  --   -- --   --    MCV 96.2  --  94.2  --  96.4  --   --   --   --     < > = values in this interval not displayed. Renal:    Recent Labs     10/02/21  0609 10/03/21  0543 10/04/21  0615    141  --    K 3.4* 3.4*  --     108  --    CO2 25 23  --    BUN 27* 17  --    CREATININE 1.0 0.8  --    GLUCOSE 107* 91  --    CALCIUM 7.7* 7.8*  --    MG 2.60* 2.70* 2.30   ANIONGAP 10 10  --      Hepatic:   No results for input(s): AST, ALT, BILITOT, BILIDIR, PROT, LABALBU, ALKPHOS in the last 72 hours. Troponin:   No results for input(s): TROPONINI in the last 72 hours. BNP: No results for input(s): BNP in the last 72 hours. Lipids: No results for input(s): CHOL, HDL in the last 72 hours. Invalid input(s): LDLCALCU, TRIGLYCERIDE  ABGs:  No results for input(s): PHART, UHA5MIJ, PO2ART, UII3DTG, BEART, THGBART, X5AGDDBJ, XOX8WSR in the last 72 hours. INR: No results for input(s): INR in the last 72 hours. Lactate: No results for input(s): LACTATE in the last 72 hours. Cultures:  -----------------------------------------------------------------  RAD:   VL Extremity Venous Bilateral         XR CHEST PORTABLE   Final Result      1. No acute process or consolidation   2. Elevated right hemidiaphragm with borderline enlarged heart. 3. Left PICC line appreciated with the tip at the atrial caval junction               IR ANGIOGRAM PULMONARY BILATERAL   Final Result      1. Satisfactory bilateral selective pulmonary artery lower lobe catheterization with EKOS TPA ultrasound catheter assisted delivery of TPA as described. 2. Rate: 6 hour infusion with 1 mg/h through each catheter has been scheduled         CT CHEST PULMONARY EMBOLISM W CONTRAST   Final Result      Massive pulmonary emboli with elevated RV/LV ratio consistent with right heart strain. .      Mild basilar atelectatic changes. Results called to Dr. Marcia Hernández at 12:58 PM on 9/30/2021. Assessment/Plan:     Jamie Witt a 78 y. o.female with a PMHx TERRANCE, HTN, MUNIZ w esophageal varices, obesity p/w chest pain 2/2 PE.      Submassive Pulmonary Embolism with RV strain  Pt had 4 days of abdominal pain which developed into SOB and sudden onset CP which prompted her to come to the ED. Never had a blood clot before. CT chest pulmonary artery showed massive pulmonary emboli with elevated RV/LV ratio consistent with right heart strain. EKOS inserted by IR. tPa was administered to bilateral PA x 6 hrs. COVID negative. - Echo read pending   - H/H today remains stable, a discussion will be had with the pt about the risks and benefits of restarting AC  - Discussed with Frank Conrad about patient's case, can be reached at [phone], ok to get warfarin if needed but will likely need heparin bridge first  - LE doppler showed DVT, IR consulted for IVC filter placement  - Consider 2 week zio on d/c per cardiology  - PT/OT    Uncomplicated UTI  UA showed cloudy urine with small leuk esterase, >100 WBC, 4+ bacteria. E coli grew on cultures. - On rocephin (day 4)    Acute blood loss anemia (resolved)  Likely 2/2 bleeding from catheter sites with heparin ggt. - Blood thinners stopped  - Continue to monitor H/H q12h, goal > 7  - Cannot transfuse given patient is Je[employer] Witness  - Continue iron infusion  - Monitor VS closely, if pt becomes hypotensive/tachycardic consider progression of PE or GI bleed as she has MUNIZ cirrhosis w/ esophageal varices    MEREDITH (resolved)  Mixed picture. Likely 2/2 hypovolemia and contrast induced. - Avoid nephrotoxic agents and contrast  - Monitor I/Os    TERRANCE on CPAP  -Continue CPAP machine     MUNIZ  With Hx of esophageal varicies. Morbidly obese. Denies hx of alcohol use.   - Continue Pepcid  -  on weight loss       Code Status: Limited  FEN: Diet NPO  PPX: SCD  DISPO: IP    Vanita Meadows MD, PGY-1  10/04/21  1:00 PM    This patient has been staffed and discussed with Irvin Blanc MD.

## 2021-10-04 NOTE — PROGRESS NOTES
BILIDIR, BILITOT, ALKPHOS in the last 72 hours. Invalid input(s): AMYLASE,  ALB  PT/INR:   Recent Labs     10/04/21  1226   PROTIME 12.4   INR 1.09     APTT:   No results for input(s): APTT in the last 72 hours. BNP:  No results for input(s): BNP in the last 72 hours. IMAGING:     Assessment:  Patient Active Problem List    Diagnosis Date Noted    [facility]     Chest pain of uncertain etiology 43/51/7592    Hypoxia 09/30/2021    Abnormal EKG 09/30/2021    Chest pain 09/30/2021    Obstructive sleep apnea (adult) (pediatric)     Hiatal hernia with GERD     Idiopathic esophageal varices without bleeding (HCC)     Gastric polyp     Essential hypertension 12/16/2016    Fatty liver disease, nonalcoholic 87/88/3834    Chronic pain of left knee 06/29/2015    Left ankle swelling 06/29/2015    Class 2 obesity with alveolar hypoventilation without serious comorbidity with body mass index (BMI) of 37.0 to 37.9 in adult ([facility] 06/29/2015    Fatigue 06/29/2015       Plan:  1. Breathing ok. No chest pain. Hgb better. Off ac due to declining Hgb. Continue to monitor. Jehovahs Witness. IVC filter.       Core Measures:  · Discharge instructions:   · LVEF documented:   · ACEI for LV dysfunction:   · Smoking Cessation:    Devorah Burnett MD, MD 10/4/2021 1:51 PM

## 2021-10-04 NOTE — DISCHARGE SUMMARY
INTERNAL MEDICINE DEPARTMENT AT [address] [place] [address]  DISCHARGE SUMMARY    Patient ID: Virgilio Crum                                             Discharge Date: 10/5/2021   Patient's PCP: Yamini Sanabria MD                                          Discharge Physician: Amado Sheht MD   Admit Date: 9/30/2021   Admitting Physician: Opal Wise MD    PROBLEMS DURING HOSPITALIZATION    DISCHARGE DIAGNOSES:  Present on Admission:  - Acute pulmonary embolism ([address]  - Acute blood loss anemia  - MEREDITH    Hospital Course:  Anabelle Mackey a 78 y. o.female with a PMHx TERRANCE, HTN, MUNIZ w esophageal varices, obesity who presented to the ED with chest pain. Pt had a code STEMI called for her chest pain and abnormal EKG. Cath was negative for any occlusion but right heart strain was seen concerning for PE. CTPE following cath confirmed massive PE. Of note, patient is Jehovah Witness and refuses blood products. She has also been having decreased appetite, loss of taste. Patient was admitted for management of acute PE. EKOS was placed for TPA administration. Heparin ggt was started. Patient had acute bleeding from cath site and R IJ catheter site after starting heparin ggt. Bleeding had stopped by the following day and H/H has remained stable. Patient was stable enough to be transferred to general floor. COVID test was obtained given unprovoked PE/DVT, which came back negative. Patient was counseled on the importance of completing all necessary screening tests outpatient and getting hypercoagulable workup on discharge. Echo was reordered which showed EF 55-60%, abnormal septal bounce and a pulm artery systolic pressure of 19 mmHg. Heparin ggt was stopped and patient was started on eliquis. Patient was quickly taken off eliquis given H/H steadily dropped.  H/H was trending down and team worked closely with Jehovah Witness liaison, Heyward Nyhan, given patient refused blood products and there were concerns H/H was Musculoskeletal:         General: No swelling or tenderness. Cervical back: Neck supple. No tenderness. Right lower leg: No edema. Left lower leg: No edema. Skin:     Capillary Refill: Capillary refill takes less than 2 seconds. Coloration: Skin is not jaundiced or pale. Findings: No rash. Neurological:      General: No focal deficit present. Mental Status: She is alert and oriented to person, place, and time. Cranial Nerves: No cranial nerve deficit. Sensory: No sensory deficit. Motor: No weakness. Coordination: Coordination normal.   Psychiatric:         Mood and Affect: Mood normal.         Behavior: Behavior normal.         Thought Content: Thought content normal.         Judgment: Judgment normal.     Consults: pulmonary/intensive care and hematology/oncology  Significant Diagnostic Studies:  chest x-ray, LE dopplers, echo, CTPE  Treatments: anticoagulation: heparin, EKOS placed by IR and IVC filter  Disposition: home with home health   Discharged Condition: Stable  Follow Up: Primary Care Physician in one week    DISCHARGE MEDICATION:     Medication List      CONTINUE taking these medications    BARIATRIC FUSION PO     Compression Stockings Misc  by Does not apply route 3 pair. Put on in am and off at bed time.  20-30 mm     losartan-hydroCHLOROthiazide 50-12.5 MG per tablet  Commonly known as: Hyzaar  Take 1 tablet by mouth daily     omeprazole 40 MG delayed release capsule  Commonly known as: PRILOSEC  TAKE ONE CAPSULE BY MOUTH DAILY          Activity: activity as tolerated  Diet: regular diet  Wound Care: keep wound clean and dry    Time Spent on discharge is more than 45 minutes    Signed:  Hawk Tapia MD, PGY-1  10/5/2021

## 2021-10-05 VITALS
SYSTOLIC BLOOD PRESSURE: 112 MMHG | WEIGHT: 274.25 LBS | BODY MASS INDEX: 50.47 KG/M2 | OXYGEN SATURATION: 93 % | TEMPERATURE: 98.8 F | RESPIRATION RATE: 21 BRPM | DIASTOLIC BLOOD PRESSURE: 58 MMHG | HEIGHT: 62 IN | HEART RATE: 89 BPM

## 2021-10-05 LAB
HCT VFR BLD CALC: 28.5 % (ref 36–48)
HEMOGLOBIN: 9.5 G/DL (ref 12–16)
MAGNESIUM: 1.8 MG/DL (ref 1.8–2.4)

## 2021-10-05 PROCEDURE — 6360000002 HC RX W HCPCS

## 2021-10-05 PROCEDURE — 94660 CPAP INITIATION&MGMT: CPT

## 2021-10-05 PROCEDURE — 36592 COLLECT BLOOD FROM PICC: CPT

## 2021-10-05 PROCEDURE — 6370000000 HC RX 637 (ALT 250 FOR IP): Performed by: INTERNAL MEDICINE

## 2021-10-05 PROCEDURE — 97166 OT EVAL MOD COMPLEX 45 MIN: CPT

## 2021-10-05 PROCEDURE — 97530 THERAPEUTIC ACTIVITIES: CPT

## 2021-10-05 PROCEDURE — 99232 SBSQ HOSP IP/OBS MODERATE 35: CPT | Performed by: INTERNAL MEDICINE

## 2021-10-05 PROCEDURE — 83735 ASSAY OF MAGNESIUM: CPT

## 2021-10-05 PROCEDURE — 85018 HEMOGLOBIN: CPT

## 2021-10-05 PROCEDURE — 2580000003 HC RX 258: Performed by: STUDENT IN AN ORGANIZED HEALTH CARE EDUCATION/TRAINING PROGRAM

## 2021-10-05 PROCEDURE — 85014 HEMATOCRIT: CPT

## 2021-10-05 PROCEDURE — 97162 PT EVAL MOD COMPLEX 30 MIN: CPT

## 2021-10-05 RX ADMIN — Medication 10 ML: at 08:56

## 2021-10-05 RX ADMIN — ALTEPLASE 1 MG: 2.2 INJECTION, POWDER, LYOPHILIZED, FOR SOLUTION INTRAVENOUS at 06:57

## 2021-10-05 RX ADMIN — FAMOTIDINE 20 MG: 20 TABLET, FILM COATED ORAL at 08:56

## 2021-10-05 ASSESSMENT — PAIN SCALES - GENERAL
PAINLEVEL_OUTOF10: 0

## 2021-10-05 NOTE — PROGRESS NOTES
Progress Note    Admit Date: 9/30/2021  Day: 5  Diet: ADULT DIET; Regular    CC: Chest pain    Interval history:   NAONE. Patient mildly hypotensive this am, otherwise HDS. She reports feeling \"not herself\". Still has some shortness of breath and generalized weakness, which is improving. Reports some abdominal cramps today. Last BM was yesterday. Tolerated IVC filter placement yesterday. Has not noted any bleeding from catheter sites. Working with PT/OT today. HPI:   Minda Childers a 78 y. o.female with a PMHx TERRANCE, HTN, MUNIZ w esophageal varices, obesity p/w chest pain. Pt had a code STEMI called for her chest pain which was found negative in the cath lab for any occlusion but right heart strain was seen concerning for massive PE. Chest pain started suddenly and occurred 4 hours prior to her coming in. Pt had some SOB attributed with it but it did radiate. In the field an ekg with subtle st elevation was seen.  Pt denies any history of smoking.      Pt had a EKOS placed for TPA.     Medications:     Scheduled Meds:   influenza virus vaccine  0.5 mL IntraMUSCular Prior to discharge    famotidine  20 mg Oral BID    oxyCODONE-acetaminophen  1 tablet Oral Once    sodium chloride flush  5-40 mL IntraVENous 2 times per day    cefTRIAXone (ROCEPHIN) IV  1,000 mg IntraVENous Q24H     Continuous Infusions:   sodium chloride      sodium chloride       PRN Meds:sodium chloride flush, sodium chloride, sodium chloride, sodium chloride flush, polyethylene glycol, acetaminophen **OR** acetaminophen    Objective:   Vitals:   T-max:  Patient Vitals for the past 8 hrs:   BP Temp Temp src Pulse Resp SpO2 Weight   10/05/21 0830 (!) 107/57 98.8 °F (37.1 °C) Oral 84 20 98 % --   10/05/21 0752 -- -- -- -- -- -- 274 lb 4 oz (124.4 kg)   10/05/21 0526 123/68 98.6 °F (37 °C) Oral 91 24 93 % --   10/05/21 0204 -- -- -- -- 26 -- --       Intake/Output Summary (Last 24 hours) at 10/5/2021 2826  Last data filed at 10/5/2021 0830  Gross per 24 hour   Intake 390 ml   Output 700 ml   Net -310 ml       Physical Exam  Constitutional:       General: She is not in acute distress. Appearance: She is obese. HENT:      Head: Normocephalic and atraumatic. Mouth/Throat:      Mouth: Mucous membranes are moist.      Pharynx: No oropharyngeal exudate or posterior oropharyngeal erythema. Eyes:      General: No scleral icterus. Conjunctiva/sclera: Conjunctivae normal.   Cardiovascular:      Rate and Rhythm: Normal rate and regular rhythm. Heart sounds: No murmur heard. No gallop. Pulmonary:      Effort: No respiratory distress. Breath sounds: No wheezing or rales. Abdominal:      General: There is no distension. Palpations: Abdomen is soft. Tenderness: There is abdominal tenderness (mild on deep palpation). There is no guarding. Musculoskeletal:      Cervical back: Neck supple. No tenderness. Right lower leg: Edema present. Left lower leg: Edema present. Skin:     Capillary Refill: Capillary refill takes less than 2 seconds. Coloration: Skin is not pale. Findings: No rash. Neurological:      Mental Status: She is oriented to person, place, and time. Cranial Nerves: No cranial nerve deficit. Sensory: No sensory deficit. Motor: No weakness. Coordination: Coordination normal.         LABS:    CBC:   Recent Labs     10/03/21  1850 10/04/21  1109 10/04/21  2003   HGB 9.2* 10.0* 10.2*   HCT 27.3* 29.6* 29.7*     Renal:    Recent Labs     10/03/21  0543 10/04/21  0615     --    K 3.4*  --      --    CO2 23  --    BUN 17  --    CREATININE 0.8  --    GLUCOSE 91  --    CALCIUM 7.8*  --    MG 2.70* 2.30   ANIONGAP 10  --      Hepatic:   No results for input(s): AST, ALT, BILITOT, BILIDIR, PROT, LABALBU, ALKPHOS in the last 72 hours. Troponin:   No results for input(s): TROPONINI in the last 72 hours.   BNP: No results for input(s): BNP in the last 72 hours. Lipids: No results for input(s): CHOL, HDL in the last 72 hours. Invalid input(s): LDLCALCU, TRIGLYCERIDE  ABGs:  No results for input(s): PHART, ADU9PEK, PO2ART, FTI8RON, BEART, THGBART, M4YOQYNL, KCD5BQT in the last 72 hours. INR:   Recent Labs     10/04/21  1226   INR 1.09     Lactate: No results for input(s): LACTATE in the last 72 hours. Cultures:  -----------------------------------------------------------------  RAD:   IR GUIDED IVC FILTER PLACEMENT   Final Result   IMPRESSION :      Normal IVC anatomy. Normal, patent right internal jugular vein. No caval thrombus. Placement of filter in the infra-renal IVC. Please note that this is a retrievable filter. Fluoroscopy time : 0.9   Number of exposures obtained : 4      Estimated blood loss: Less than 5 mL. VL Extremity Venous Bilateral         XR CHEST PORTABLE   Final Result      1. No acute process or consolidation   2. Elevated right hemidiaphragm with borderline enlarged heart. 3. Left PICC line appreciated with the tip at the atrial caval junction               IR ANGIOGRAM PULMONARY BILATERAL   Final Result      1. Satisfactory bilateral selective pulmonary artery lower lobe catheterization with EKOS TPA ultrasound catheter assisted delivery of TPA as described. 2. Rate: 6 hour infusion with 1 mg/h through each catheter has been scheduled         CT CHEST PULMONARY EMBOLISM W CONTRAST   Final Result      Massive pulmonary emboli with elevated RV/LV ratio consistent with right heart strain. .      Mild basilar atelectatic changes. Results called to Dr. Piyush Zuleta at 12:58 PM on 9/30/2021. Assessment/Plan:     Ramo Blanc a 78 y. o.female with a PMHx TERRANCE, HTN, MUNIZ w esophageal varices, obesity p/w chest pain 2/2 PE.      Submassive Pulmonary Embolism with RV strain  Pt had 4 days of abdominal pain which developed into SOB and sudden onset CP which prompted her to come to the ED.  Never had a blood clot before. CT chest pulmonary artery showed massive pulmonary emboli with elevated RV/LV ratio consistent with right heart strain. EKOS inserted by IR. tPa was administered to bilateral PA x 6 hrs. COVID negative. Echo 10/2 shows overall left ventricular systolic function appears normal with an ejection fraction of 55-60%. Abnormal septal bounce. Estimated pulmonary artery systolic pressure is at 19 mmHg assuming a right   atrial pressure of 8 mmHg    - H/H today remains stable  - Discussed patient's case with Alyson Bullocks, can be reached at [phone], ok to get warfarin if needed but will likely need heparin bridge first  - LE doppler showed DVT, IR placed IVC filter placement 10/4  - Consider 2 week zio on d/c per cardiology  - PT/OT pending    Uncomplicated UTI  UA showed cloudy urine with small leuk esterase, >100 WBC, 4+ bacteria. E coli grew on cultures. - On rocephin (day 5)    Acute blood loss anemia (resolved)  Likely 2/2 bleeding from catheter sites with heparin ggt. - Blood thinners stopped  - S/p iron infusion  - Continue to monitor H/H q12h, goal > 7  - Cannot transfuse given patient is Jehovah Witness  - Monitor VS closely, if pt becomes hypotensive/tachycardic consider progression of PE or GI bleed as she has MUNIZ cirrhosis w/ esophageal varices    MEREDITH (resolved)  Mixed picture. Likely 2/2 hypovolemia and contrast induced. - Avoid nephrotoxic agents and contrast  - Monitor I/Os    TERRANCE on CPAP  -Continue CPAP machine     MUNIZ  With Hx of esophageal varicies. Morbidly obese. Denies hx of alcohol use. - Continue Pepcid  -  on weight loss       Code Status: Limited  FEN: ADULT DIET;  Regular  PPX: SCD  DISPO: Discharge, pending PT/OT sigifredo Mesa MD, PGY-1  10/05/21  9:21 AM    This patient has been staffed and discussed with Esme Harris MD.

## 2021-10-05 NOTE — PROGRESS NOTES
Pulmonology Progress Note  PGY-2    Admit Date: 9/30/2021  Hospital Day: 6  Diet: ADULT DIET; Regular  Code Status: Limited       Interval history:  Patient was seen and examined this morning. She has been afebrile overnight. Hemoglobin 9.5 this morning. IVC filter was placed yesterday. Pt mentions that she has right lower extremity pain. Endorses occasional non productive cough. Denies having fevers, chills, chest pain, shortness of breath, nausea, vomiting, diarrhea, or constipation. Medications:   Scheduled Meds:   influenza virus vaccine  0.5 mL IntraMUSCular Prior to discharge    famotidine  20 mg Oral BID    oxyCODONE-acetaminophen  1 tablet Oral Once    sodium chloride flush  5-40 mL IntraVENous 2 times per day    cefTRIAXone (ROCEPHIN) IV  1,000 mg IntraVENous Q24H       Continuous Infusions:   sodium chloride      sodium chloride         PRN Meds:  sodium chloride flush, sodium chloride, sodium chloride, sodium chloride flush, polyethylene glycol, acetaminophen **OR** acetaminophen    Vital/I&O/Physical examination:   VS:  BP (!) 107/57   Pulse 84   Temp 98.8 °F (37.1 °C) (Oral)   Resp 20   Ht 5' 2\" (1.575 m)   Wt 274 lb 4 oz (124.4 kg)   SpO2 98%   BMI 50.16 kg/m²     I/O:    Intake/Output Summary (Last 24 hours) at 10/5/2021 0947  Last data filed at 10/5/2021 0830  Gross per 24 hour   Intake 240 ml   Output 700 ml   Net -460 ml       PE:  Physical Exam  Vitals reviewed. Constitutional:       Appearance: She is obese. HENT:      Head: Normocephalic and atraumatic. Nose: Nose normal.      Mouth/Throat:      Mouth: Mucous membranes are moist.   Eyes:      Extraocular Movements: Extraocular movements intact. Conjunctiva/sclera: Conjunctivae normal.      Pupils: Pupils are equal, round, and reactive to light. Cardiovascular:      Rate and Rhythm: Normal rate and regular rhythm. Pulses: Normal pulses. Heart sounds: Normal heart sounds.    Pulmonary:      Effort: Pulmonary effort is normal.      Breath sounds: Normal breath sounds. Abdominal:      Palpations: Abdomen is soft. Musculoskeletal:         General: Normal range of motion. Cervical back: Normal range of motion and neck supple. Right lower leg: Edema present. Left lower leg: Edema present. Neurological:      General: No focal deficit present. Mental Status: She is alert and oriented to person, place, and time. Labs & Imaging:   LABS:  Renal:   Recent Labs     10/03/21  0543      K 3.4*      CO2 23   BUN 17   CREATININE 0.8   GLUCOSE 91   ANIONGAP 10     CBC:   Recent Labs     10/03/21  1850 10/04/21  1109 10/04/21  2003   HGB 9.2* 10.0* 10.2*   HCT 27.3* 29.6* 29.7*                            Hepatic: No results for input(s): AST, ALT, ALB, BILITOT, ALKPHOS in the last 72 hours. Troponin: No results for input(s): TROPONINI in the last 72 hours. BNP: No results for input(s): BNP in the last 72 hours. Lipids: No results for input(s): CHOL, HDL in the last 72 hours. Invalid input(s): LDLCALCU, TRIGLYCERIDE  INR:   Recent Labs     10/04/21  1226   INR 1.09     Lactate: No results for input(s): LACTATE in the last 72 hours. ABGs:No results for input(s): PHART, RHR3BJS, PO2ART, PUW8CJS, BEART, THGBART, C4JQRYLP, OXP3NQH in the last 72 hours. UA:No results for input(s): NITRITE, COLORU, PHUR, LABCAST, WBCUA, RBCUA, MUCUS, TRICHOMONAS, YEAST, BACTERIA, CLARITYU, SPECGRAV, LEUKOCYTESUR, UROBILINOGEN, BILIRUBINUR, BLOODU, GLUCOSEU, AMORPHOUS in the last 72 hours. Invalid input(s): [place]     IMAGING:  IR GUIDED IVC FILTER PLACEMENT   Final Result   IMPRESSION :      Normal IVC anatomy. Normal, patent right internal jugular vein. No caval thrombus. Placement of filter in the infra-renal IVC. Please note that this is a retrievable filter. Fluoroscopy time : 0.9   Number of exposures obtained : 4      Estimated blood loss: Less than 5 mL.             VL Extremity Venous Bilateral         XR CHEST PORTABLE   Final Result      1. No acute process or consolidation   2. Elevated right hemidiaphragm with borderline enlarged heart. 3. Left PICC line appreciated with the tip at the atrial caval junction               IR ANGIOGRAM PULMONARY BILATERAL   Final Result      1. Satisfactory bilateral selective pulmonary artery lower lobe catheterization with EKOS TPA ultrasound catheter assisted delivery of TPA as described. 2. Rate: 6 hour infusion with 1 mg/h through each catheter has been scheduled         CT CHEST PULMONARY EMBOLISM W CONTRAST   Final Result      Massive pulmonary emboli with elevated RV/LV ratio consistent with right heart strain. .      Mild basilar atelectatic changes. Results called to Dr. Rutledge Manual at 12:58 PM on 9/30/2021. Assessment & Plan:    Avril Aguilar is a 78 y.o. female presented with 4 days of shortness of breath with the right leg pain. Submassive pulmonary embolism with RV strain:  - 2/2 to DVT  Patient had 4 days of abdominal pain that was weak in nature which developed to shortness of breath and mild chest pain with RUNNING which prompted her to come to ED. Patient denied any smoking history, last mammogram 2 years ago which was negative. She is not vaccinated for Covid. Patient lost taste and smell the last 2 weeks but has had any known sick exposures. She does not have any history of blood clot. proBNP 5150 with EKG showing mild ST elevation in the anterior leads. Cath Lab was negative for any ischemia in the coronary arteries. CTPA showed massive pulmonary emboli with elevated RV/LV ratio consistent with right heart strain. -LHC clear, EF 55%  -S/p EKOS  -Off anticoagulation for concern of bleeding given drop of hemoglobin from 14 at admission to 9.5 this morning.-Echo on October 4th showed EF of 55-60%,  estimated pulmonary artery systolic pressure is at 19 mmHg assuming a right   atrial pressure of 8 mmHg.   -Covid test negative  -Doppler ultrasound of lower extremities on 10/04/2021 showed there is acute deep venous thrombosis in both posterior tibial veins on the right lower extremity. And there is acute deep venous thrombosis in both posterior tibial veins into gastrocnemius vein on the left lower extremity.  -Patient is Tenriism and will not take all blood products making use of anticoagulation with dropping HgB quite risky  -IVC filter was placed by IR yesterday. Anemia:  -Patient presented with hemoglobin of 14 at admission. Hemoglobin this morning 9.5.  -Hemoglobin every 12 hours  -Hematology following  -Continue Pepcid  -GI on board  -Patient is Islam and will not take all blood products. TERRANCE:  -On CPAP      Hypertension:  -Patient takes lisinopril at home. Currently normotensive and blood pressure medications were held. MUNIZ:  -With history of esophageal varices. Code Status: Limited   ADULT DIET;  Regular    PPX: Pepcid, SCDs       This patient will be discussed with attending, Dr. Armani Arredondo MD.    Heidi Ta MD, PGY- 2  Contact via [employer]  10/5/2021,  9:47 AM    Pulm    Patient was discharged home prior to being seen    Neil Craft MD

## 2021-10-05 NOTE — PROGRESS NOTES
Physician Progress Note      PATIENT:               Betty Ordonez  [facility] #:                  [id number]  :                       1955  ADMIT DATE:       2021 11:49 AM  DISCH DATE:  RESPONDING  PROVIDER #:        Halima Garcia MD          QUERY TEXT:    Pt admitted with CP. Pt noted to have PE and UTI. If possible, please   document in the progress notes and discharge summary if you are evaluating and   /or treating any of the following: The medical record reflects the following:  Risk Factors: 79 y/o female with UTI  Clinical Indicators: WBC on  16.3 10/1 WBC trends 17.5, 14.0  10/2 WBC   13.7 RR 20-25  LA 3.1 on admission   PN: Uncomplicated UTI  UA showed cloudy urine with small leuk esterase, >100 WBC, 4+ bacteria. Patient asymptomatic. E coli grew on cultures. \"  Treatment: Rocephin, IVF bolus 1,000ml, Lab monitoring, UA , Urine cultures  Options provided:  -- Sepsis, present on admission due to UTI  -- Sepsis, not present on admission due to UTI  -- UTI without Sepsis  -- Other - I will add my own diagnosis  -- Disagree - Not applicable / Not valid  -- Disagree - Clinically unable to determine / Unknown  -- Refer to Clinical Documentation Reviewer    PROVIDER RESPONSE TEXT:    This patient has UTI without Sepsis.     Query created by: Joey Franklin on 10/4/2021 1:45 PM      Electronically signed by:  Halima Garcia MD 10/5/2021 8:55 AM

## 2021-10-05 NOTE — PROGRESS NOTES
Patient safely transferred to our unit from . Patient oriented to unit policies and procedures including: pain management practices, unit safety precautions, q4h vital signs and assessments, daily 4am lab draws, and routine central line care. Also discussed use of call light and how to get in touch with nursing staff. Stressed the importance of calling out immediately for any changes in condition. Patient verbalizes understanding of all instructions and will call for assistance as needed.

## 2021-10-05 NOTE — CARE COORDINATION
[facility]    Patient aware and agreeable to services.  Faxed orders to [facility] for [facility] by 10/7/21    Raghu Blank LPN  Care Transition Nurse  [address]  [phone]

## 2021-10-05 NOTE — CARE COORDINATION
Case Management Assessment            Discharge Note                    Date / Time of Note: 10/5/2021 8:59 AM                  Discharge Note Completed by: BENIGNO June    Patient Name: Yanci Barksdale   YOB: 1955  Diagnosis: Chest pain [R07.9]   Date / Time: 9/30/2021 11:49 AM    Current PCP: El Mccarthy MD  Clinic patient: No    Hospitalization in the last 30 days: No    Advance Directives:  Code Status: Limited  [place] DNR form completed and on chart: No    Financial:  Payor: Sulma Leger / Plan: Lacy Dose / Product Type: *No Product type* /      Pharmacy:    [address], [address] [place] [address]  Phone: [phone] Fax: [phone]      Assistance purchasing medications?:    Assistance provided by Case Management: None at this time    Does patient want to participate in local refill/ meds to beds program?:      Meds To Beds General Rules:  1. Can ONLY be done Monday- Friday between 8:30am-5pm  2. Prescription(s) must be in pharmacy by 3pm to be filled same day  3. Copy of patient's insurance/ prescription drug card and patient face sheet must be sent along with the prescription(s)  4. Cost of Rx cannot be added to hospital bill. If financial assistance is needed, please contact unit  or ;  or  CANNOT provide pharmacy voucher for patients co-pays  5.  Patients can then  the prescription on their way out of the hospital at discharge, or pharmacy can deliver to the bedside if staff is available. (payment due at time of pick-up or delivery - cash, check, or card accepted)     Able to afford home medications/ co-pay costs: Yes    ADLS:  Current PT AM-PAC Score:   /24  Current OT AM-PAC Score:   /24      DISCHARGE Disposition: Home with [address]: [facility]     LOC at discharge: Not Applicable  MEME Completed: Yes    Notification completed in HENS/PAS?:  Not Applicable    IMM Completed:   Yes, Case management has presented and reviewed IMM letter #2 to the patient and/or family/ POA. Patient and/or family/POA verbalized understanding of their medicare rights and appeal process if needed. Patient and/or family/POA has signed, initialed and placed today's date (10/5/2021) and time (0845) on IMM letter #2 on the the appropriate lines. Patient and/or family/POA, copy of letter offered and they are aware that this original copy of IMM letter #2 is available prior to discharge from the paper chart on the unit. Electronic documentation has been entered into epic for IMM letter #2 and original paper copy has been added to the paper chart at the nurses station. Transportation:  Transportation PLAN for discharge: family   Mode of Transport: Private Car  Reason for medical transport: Not Applicable  Name of [address] [place] [address],[address]: Not Applicable  Time of Transport: afternoon    Transport form completed: No    Home Care:  [address] ordered at discharge: No  [address]: Not Applicable  Orders faxed: No    Durable Medical Equipment:  DME Provider: none  Equipment obtained during hospitalization: none    Home Oxygen and Respiratory Equipment:  Oxygen needed at discharge?: No  [address]: Not Applicable  Portable tank available for discharge?: No    Dialysis:  Dialysis patient: No    Dialysis Center:  Not Applicable    Hospice Services:  Location: Not Applicable  Agency: Not Applicable    Consents signed: No    [place] [facility] [place] continued care:  Not Applicable    Additional CM Notes: Patient to discharge home with her daughter. She is agreeable to [facility] services through [facility]. Her daughter will transport her at discharge. 10:31 AM  Informed by therapy that the patient was being recommended for SNF.  Met with patient at bedside and provided her with a SNF list. She informed that she and her daughter spoke and she does not want to go to a SNF. Spoke to patient's daughter. She verified that the patient will come home with her and that she can provide 24 hour assist/supervision. S4 C arranged. The Plan for Transition of Care is related to the following treatment goals of Chest pain [R07.9]    The Patient and/or patient representative Amber King and her family were provided with a choice of provider and agrees with the discharge plan Yes    Freedom of choice list was provided with basic dialogue that supports the patient's individualized plan of care/goals and shares the quality data associated with the providers.  Yes    Care Transitions patient: Yes    [facility]  Case Management Department  Ph: [phone]  Fax: [phone]

## 2021-10-05 NOTE — PROGRESS NOTES
Occupational Therapy   Occupational Therapy Initial Assessment/Tx Note  Date: 10/5/2021   Patient Name: Vianney Sanchez  MRN: [id number]     : 1955    Date of Service: 10/5/2021     Assessment: Pt's functional independence is significantly decreased from baseline. She requires CGA to min assist for functional mobility, but needs walker and only tolerate transfer to chair. Pt requires assist with dressing, bathing and toileting. Plan was for pt to d/c to her daughter's house, but home has 5 ADRIEN and daughter works. Pt would benefit from continued inpt OT/PT to increase independence and endurance prior to returning home. If pt discharges home, she would have significant difficulty entering home but would need close 24 hr A, home OT. Discharge Recommendations: Vianney Sanchez scored a 16/24 on the AM-PAC ADL Inpatient form. Current research shows that an AM-PAC score of 17 or less is typically not associated with a discharge to the patient's home setting. Based on the patient's AM-PAC score and their current ADL deficits, it is recommended that the patient have 3-5 sessions per week of Occupational Therapy at d/c to increase the patient's independence. Please see assessment section for further patient specific details. OT Equipment Recommendations  Equipment Needed: No (defer)    Assessment   Performance deficits / Impairments: Decreased functional mobility ; Decreased ADL status; Decreased endurance;Decreased balance;Decreased high-level IADLs  Treatment Diagnosis: Decreased activity tolerance, impaired ADLs and mobility  Decision Making: Medium Complexity  REQUIRES OT FOLLOW UP: Yes  Activity Tolerance  Activity Tolerance: Patient limited by fatigue  Activity Tolerance: Dizzy upon sitting. BP stable. Minimal resolution of symptoms but able to tolerate transfer to chair. All mobility very effortful, pt c/o increased work of breathing.  RR 37, HR WFL. spO2 down to 92% after transfer, gradually returning to 100%. Educated on ways to safely increase activity levels with assist during remainder of admit - verb understanding  Safety Devices  Safety Devices in place: Yes  Type of devices: Call light within reach; Chair alarm in place; Left in chair;Nurse notified         Treatment Diagnosis: Decreased activity tolerance, impaired ADLs and mobility      Restrictions  Position Activity Restriction  Other position/activity restrictions: Up with assist    Subjective   General  Chart Reviewed: Yes  Additional Pertinent Hx: 78 y.o. F admitted 9/30 with CP. s/p LHC showing no CAD, but heart strain concerning for PE. Imagining demonstrated massive PE, s/p EKOS/TPA. Significant decrease in Hgb during admit, suggestive of active bleed. Pt does not accept blood products for Confucianism reasons, anticoagulation stopped. LE dopplers ordered. PMHx includes arthritis, HTN, sleep apnea, obesity, sleeve gastrectomy,  Family / Caregiver Present: No  Referring Practitioner: tiffany  Subjective  Subjective: Pt in bed on entry. Flat affect, but pleasant and cooperative. Feeling weak and SOB with activity. General Comment  Comments: LEs and abdomen a little sore, no pain at rest    Social/Functional History  Social/Functional History  Lives With: Daughter  Type of Home: House  Home Layout: One level  Home Access: Stairs to enter without rails  Entrance Stairs - Number of Steps: 5 ADRIEN  Bathroom Shower/Tub: Tub/Shower unit  Bathroom Toilet: Standard  Bathroom Equipment: Shower chair  Home Equipment: Rolling walker  ADL Assistance: Independent  Homemaking Assistance: Independent  Ambulation Assistance: Independent  Transfer Assistance: Independent  Active : No  Leisure & Hobbies: reading  Additional Comments: Pt will d/c to her daughter's house. Daughter works during the day. Pt herself lives in a senior building with a roommate, no stairs, has elevator, tub/shower, grab bars in bathroom. Roommate unable to provide assistance.        Objective Orientation  Overall Orientation Status: Within Functional Limits     Balance  Sitting Balance: Independent  Standing Balance: Contact guard assistance  Standing Balance  Time: <1 min  Functional Mobility  Functional - Mobility Device: Rolling Walker  Toilet Transfers  Toilet - Technique: Stand step  Equipment Used: Standard bedside commode  Toilet Transfer: Minimal assistance  Toilet Transfers Comments: simulated  ADL  Feeding: Independent; Beverage management  Grooming: Setup (unable to reach bathroom for grooming, would need to perform in bedside chair)  LE Dressing: Maximum assistance  Toileting: Maximum assistance (purwick in use, unable to reach bathroom; encouraged to trial [facility] with assist today to increase activity levels)     Transfers  Stand Step Transfers: Minimal assistance  Sit to stand: Minimal assistance  Stand to sit: Contact guard assistance     Cognition  Overall Cognitive Status: WFL (at baseline)        Plan  If pt discharges prior to next tx, this note will serve as d/c summary. Continue per POC if pt does not d/c.     Plan  Times per week: 2-5x  Times per day: Daily  Current Treatment Recommendations: Functional Mobility Training, Balance Training, Endurance Training, Safety Education & Training, Patient/Caregiver Education & Training, Equipment Evaluation, Education, & procurement, Self-Care / ADL      AM-PAC Score     AM-[facility] Inpatient Daily Activity Raw Score: 16 (10/05/21 0944)  AM-PAC Inpatient ADL T-Scale Score : 35.96 (10/05/21 0944)  ADL Inpatient CMS 0-100% Score: 53.32 (10/05/21 0944)  ADL Inpatient CMS G-Code Modifier : CK (10/05/21 0944)    Goals  Short term goals  Time Frame for Short term goals: by D/C  Short term goal 1:  Increase standing/mobility tolerance to 4 min - Not met  Short term goal 2: Tolerate ambulation to/from bathroom for toileting - Not met  Short term goal 3: Dress lower body SBA with AE as needed - Not met  Short term goal 4: Transfer to/from toilet and chairs with SBA - Not met       Therapy Time   Individual Concurrent Group Co-treatment   Time In 0855         Time Out 0933         Minutes 38          Timed Code Tx Min: 23  Total Tx Time: 5360 Ian Quiñones, OT

## 2021-10-05 NOTE — PROGRESS NOTES
Patient's R DL PICC removed per resident order. Patient tolerated well. Pressure held. No bleeding noted. Occlusive dressing placed.

## 2021-10-05 NOTE — PROGRESS NOTES
Abnormal EKG 09/30/2021    Chest pain 09/30/2021    Obstructive sleep apnea (adult) (pediatric)     Hiatal hernia with GERD     Idiopathic esophageal varices without bleeding (HCC)     Gastric polyp     Essential hypertension 12/16/2016    Fatty liver disease, nonalcoholic 32/73/0344    Chronic pain of left knee 06/29/2015    Left ankle swelling 06/29/2015    Class 2 obesity with alveolar hypoventilation without serious comorbidity with body mass index (BMI) of 37.0 to 37.9 in adult [facility]) 06/29/2015    Fatigue 06/29/2015       Plan:  1. Breathing good. On RA. No chest pain. Hgb better. Off ac due to declining Hgb which was 9.5 this am.  Continue to monitor. Jehovahs Witness. IVC filter placed. Nothing to add. Will follow from a distance.   F/U OP with Dr Violette De Leon (his pt.)       Core Measures:  · Discharge instructions:   · LVEF documented:   · ACEI for LV dysfunction:   · Smoking Cessation:    Devorah Burnett MD, MD 10/5/2021 2:50 PM

## 2021-10-05 NOTE — DISCHARGE INSTR - COC
Continuity of Care Form    Patient Name: Lopez Camargo   :  1955  MRN:  [id number]    Admit date:  2021  Discharge date:  ***    Code Status Order: Limited   Advance Directives:      Admitting Physician:  Danielito Mobley MD  PCP: Mally Guerrero MD    Discharging Nurse: [facility] Unit/Room#: 4104/0670-86  Discharging Unit Phone Number: ***    Emergency Contact:   Extended Emergency Contact Information  Primary Emergency Contact: [address] of [address] [facility] Phone: [phone]  Relation: Child  Secondary Emergency Contact: Smita [facility] [address] [facility] Phone: [phone]  Relation: Other    Past Surgical History:  Past Surgical History:   Procedure Laterality Date    COLONOSCOPY  2018    COLONOSCOPY POLYPECTOMY SNARE/COLD BIOPSY TRANSVERSE POLYP performed by Brissa Davis MD at [facility] IR [address] [place]  10/4/2021    IR IVC FILTER PLACEMENT W IMAGING 10/4/2021 [address] N/A 2019    ROBOTIC ASSISTED LAPAROSCOPIC SLEEVE GASTRECTOMY, LAPAROSCOPIC HIATAL HERNIA REPAIR performed by Process and Plant Salesus DO at [facility] [address] ENDOSCOPY N/A 2018    EGD BIOPSY GASTRIC ANTRUM BX R/O HP BX/COLD SNARE GASATRIC POLYP performed by Billy tenXerDO annalisa at [address] [facility] N/A 2018    EGD POLYP SNARE performed by Billy Medicus DO at [address] ENDOSCOPY       Immunization History: There is no immunization history for the selected administration types on file for this patient.     Active Problems:  Patient Active Problem List   Diagnosis Code    Chronic pain of left knee M25.562, G89.29    Left ankle swelling M25.472    Class 2 obesity with alveolar hypoventilation without serious comorbidity with body mass index (BMI) of 37.0 to 37.9 in adult (HCC) E66.2, Z68.37    Fatigue R53.83    Fatty liver disease, nonalcoholic H27.5    Essential hypertension I10    Hiatal hernia with GERD K21.9, K44.9    Idiopathic esophageal varices without bleeding (HCC) I85.00    Gastric polyp K31.7    Obstructive sleep apnea (adult) (pediatric) G47.33    Chest pain of uncertain etiology W16.5    Hypoxia R09.02    Abnormal EKG R94.31    Chest pain R07.9    Acute pulmonary embolism (HCC) I26.99       Isolation/Infection:   Isolation            No Isolation          Patient Infection Status       Infection Onset Added Last Indicated Last Indicated By Review Planned Expiration Resolved Resolved By    None active    Resolved    COVID-19 Rule Out 09/30/21 09/30/21 09/30/21 COVID-19 (Ordered)   10/01/21 Rule-Out Test Resulted            Nurse Assessment:  Last Vital Signs: BP (!) 107/57   Pulse 84   Temp 98.8 °F (37.1 °C) (Oral)   Resp 20   Ht 5' 2\" (1.575 m)   Wt 274 lb 4 oz (124.4 kg)   SpO2 98%   BMI 50.16 kg/m²     Last documented pain score (0-10 scale): Pain Level: 0  Last Weight:   Wt Readings from Last 1 Encounters:   10/05/21 274 lb 4 oz (124.4 kg)     Mental Status:  {IP PT MENTAL STATUS:60594}    IV Access:  { MEME IV ACCESS:413555088}    Nursing Mobility/ADLs:  Walking   {P DME GXQL:653147299}  Transfer  {P DME GLFQ:345149648}  Bathing  {P DME THAE:220992801}  Dressing  {[facility] DME OFZN:427376655}  Toileting  {P DME CMJX:968216390}  Feeding  {P DME VJSV:377843778}  Med Admin  {P DME MNWF:164725523}  Med Delivery   { MEME MED Delivery:398174082}    Wound Care Documentation and Therapy:  Puncture 09/30/21 Right (Active)   Number of days: 4       Puncture 09/30/21 Anterior;Proximal;Right (Active)   Number of days: 4        Elimination:  Continence:   · Bowel: {YES / XI:64075}  · Bladder: {YES / MB:68657}  Urinary Catheter: {Urinary Catheter:176754477}   Colostomy/Ileostomy/Ileal Conduit: {YES / YT:08619}       Date of Last BM: ***    Intake/Output Summary (Last 24 hours) at 10/5/2021 0910  Last data filed at 10/5/2021 0830  Gross per 24 hour   Intake 390 ml   Output 700 ml   Net -310 ml     I/O last 3 completed shifts:   In: 1 [P.O.:270]  Out: 700 [Urine:700]    Safety Concerns:     [address] Safety Concerns:746650140}    Impairments/Disabilities:      [address] MEME Impairments/Disabilities:859970197}    Nutrition Therapy:  Current Nutrition Therapy:   [address] Diet List:066492520}    Routes of Feeding: {CHP DME Other Feedings:491550697}  Liquids: {Slp liquid thickness:88570}  Daily Fluid Restriction: {CHP DME Yes amt example:484057688}  Last Modified Barium Swallow with Video (Video Swallowing Test): {Done Not Done TXRT:206265776}    Treatments at the Time of Hospital Discharge:   Respiratory Treatments: ***  Oxygen Therapy:  {Therapy; copd oxygen:03955}  Ventilator:    {MH CC Vent QHAD:554573681}    Rehab Therapies: HOME HEALTH CARE: LEVEL 4 SICK   Home health agency to establish plan of care for patient over 60 day period   Nursing:   Initial home SN evaluation visit to occur within 24-48 hours of hospital discharge   [facility] nursing visits daily, then QOD with progression as warranted for:   medication management   VS and clinical assessment   S&S chronic disease exacerbation education + when to contact MD / NP   care coordination   Medication Reconciliation during 1st SN visit   Nurse telephone visit on the days that visit is not being made in person for first 30 days                                     PT/OT   Evaluations in home within 24-48 hours of hospital discharge to include DME and home safety   Frontload therapy 5 days, then 3x a week   OT evaluation for [address] needs for personal care       Palliative Care referral within 5 days of hospital discharge          Dietician evaluation within 5 days of hospital discharge       PCP visit within 3 days of hospital discharge, followed by PCP visit @ 10 days, and 21 days   Recommend for home care vitals  Weight Bearing Status/Restrictions: [address] Weight Bearin}  Other Medical Equipment (for information only, NOT a DME order):  {EQUIPMENT:139131484}  Other Treatments: ***    Patient's personal belongings (please select all that are sent with patient):  {CHP DME Belongings:033219323}    RN SIGNATURE:  {Esignature:727461968}    CASE MANAGEMENT/SOCIAL WORK SECTION    Inpatient Status Date: ***    Readmission Risk Assessment Score:  Readmission Risk              Risk of Unplanned Readmission:  11           Discharging to Facility/ Agency   · Name:   · Address:  · Phone:  · Fax:    Dialysis Facility (if applicable)   · Name:  · Address:  · Dialysis Schedule:  · Phone:  · Fax:    / signature: {Esignature:432501353}    PHYSICIAN SECTION    Prognosis: Fair    Condition at Discharge: Stable    Rehab Potential (if transferring to Rehab): Fair    Recommended Labs or Other Treatments After Discharge: none    Physician Certification: I certify the above information and transfer of Scott Guardado  is necessary for the continuing treatment of the diagnosis listed and that she requires Home Care for greater 30 days.      Update Admission H&P: No change in H&P    PHYSICIAN SIGNATURE:  Electronically signed by Christine Morse MD on 10/5/21 at 9:32 AM EDT

## 2021-10-05 NOTE — PLAN OF CARE
Problem: Falls - Risk of:  Goal: Will remain free from falls  Description: Will remain free from falls  Outcome: Ongoing  Note:  Pt is a High fall risk. See Perry Hoop Fall Score and ABCDS Injury Risk assessments.    + High Fall Risk per BARBOSA/ABCDS: Explained fall risk precautions to pt and family and rationale behind their use to keep the patient safe. Pt bed is in low position, side rails up, call light and belongings are in reach. Fall wristband applied and present on pts wrist.  Bed alarm on. Pt encouraged to call for assistance. Will continue with hourly rounds for PO intake, pain needs, toileting and repositioning as needed. Problem: Skin Integrity:  Goal: Absence of new skin breakdown  Description: Absence of new skin breakdown  Outcome: Ongoing  Note: Patient with bruising to R arm. Hematoma on R fem site is soft, bruising noted. Problem: Pain:  Goal: Pain level will decrease  Description: Pain level will decrease  Outcome: Ongoing  Note: Patient denies pain on assessment.

## 2021-10-05 NOTE — PROGRESS NOTES
Physical Therapy    Facility/Department: [address]  Initial Assessment and Treatment    NAME: Corbin Benavides  : 1955  MRN: [id number]    Date of Service: 10/5/2021    Discharge Recommendations:  Corbin Benavides scored a 16/24 on the AM-PAC short mobility form. Current research shows that an AM-PAC score of 17 or less is typically not associated with a discharge to the patient's home setting. Based on the patient's AM-PAC score and their current functional mobility deficits, it is recommended that the patient have 3-5 sessions per week of Physical Therapy at d/c to increase the patient's independence. Please see assessment section for further patient specific details. PT Equipment Recommendations  Equipment Needed: No    Assessment   Assessment: Pt from home with chest pain and (+) PE.  Pt very independent prior to admit and now requiring rolling walker for mobility. Unable to tolerate ambulation beyond bedside chair due to dyspnea and increased respirations. SpO2 low 90s with activity. If pt returns home, pt would need 24hr assist and home PT to ensure safety. Pt states she can stay with daughter, however concerns for pt being able to manage 5 steps needed to enter house and daughter goes to work during the day. Pt would benefit from continued skilled PT to increase functional independence. Will follow. Treatment Diagnosis: Decreased gait due to PE. Decision Making: Medium Complexity  Patient Education: Role of PT. Pt verbalized understanding. REQUIRES PT FOLLOW UP: Yes         Restrictions  Up with assist     Vision/Hearing  Vision: Within Functional Limits (wears glasses)  Hearing Exceptions: Hard of hearing/hearing concerns       Subjective  Chart Reviewed: Yes  Additional Pertinent Hx: Pt admitted  for chest pain. Imaing (+) for PE. Pt s/p EKOS procedure. LE dopplers (+) DVT. IVC filter placed 10/4.   PMH:  OA, HTN, back pain, obesity, sleep apnea  Diagnosis: Chest pain    Subjective  Subjective: Pt found supine in bed. \"I'll try. \"  \"Maybe you should just put me in a nursing home? \"  Pain Screening  Patient Currently in Pain: Denies    Orientation  Within Functional Limits    Social/Functional History  Lives With: Daughter  Type of Home: House  Home Layout: One level  Home Access: Stairs to enter without rails  Entrance Stairs - Number of Steps: 5 ADRIEN  Bathroom Shower/Tub: Tub/Shower unit  Bathroom Toilet: Standard  Bathroom Equipment: Shower chair  Home Equipment: Rolling walker  ADL Assistance: Independent  Homemaking Assistance: Independent  Ambulation Assistance: Independent  Transfer Assistance: Independent  Active : No  Leisure & Hobbies: reading  Additional Comments: Pt will d/c to her daughter's house. Daughter works during the day. Pt herself lives in a senior building with a roommate, no stairs, has elevator, tub/shower, grab bars in bathroom. Roommate unable to provide assistance. Objective  Strength  Strength RLE: WFL  Strength LLE: WFL    Bed mobility  Supine to Sit: Contact guard assistance (HOB raised, verbal cues, increased time/effort)     Transfers  Sit to Stand: Minimal Assistance (from chair to walker, cues for hand placement)  Stand to sit: Minimal Assistance (cues for hand placement and controlled descent)  Bed to Chair: Minimal assistance (stand step pivot with walker)     Ambulation  Device: Rolling Walker  Assistance: Minimal assistance  Gait Deviations: Increased ADARSH; Decreased step length;Decreased step height  Distance: 3-4 steps  Comments: Increased dyspnea and respiratory rate. SpO2 low 90s on room air.     Balance  Sitting - Static: Good  Sitting - Dynamic: Good  Standing - Static: Fair  Standing - Dynamic: Poor (Min A with rolling walker)    Treatment included gait and transfer training with patient education     Plan   Times per week: 2-5  Current Treatment Recommendations: Transfer Training, Gait Training, Functional Mobility Training, Strengthening    Safety Devices  Type of devices: Left in chair, Chair alarm in place, Call light within reach, Nurse notified      AM-PAC Score  AM-PAC Inpatient Mobility Raw Score : 16 (10/05/21 0938)  AM-PAC Inpatient T-Scale Score : 40.78 (10/05/21 5063)  Mobility Inpatient CMS 0-100% Score: 54.16 (10/05/21 7018)  Mobility Inpatient CMS G-Code Modifier : CK (10/05/21 0787)    Goals  Short term goals  Time Frame for Short term goals: Discharge  Short term goal 1: supine <> sit supervision  Short term goal 2: sit <> stand supervision  Short term goal 3: ambulate 50ft with rolling walker SBA  Short term goal 4: ambulate up/date 4 steps with rail CGA (if home)  Patient Goals   Patient goals : Return home       Therapy Time   Individual Concurrent Group Co-treatment   Time In 0855         Time Out 0933         Minutes 38         Timed Code Treatment Minutes:  25  Total Treatment Minutes:  [facility], PT

## 2021-10-05 NOTE — PROGRESS NOTES
Previous nurse said in report that pt's PICC flushed well, but they have not been able to get blood return. Lab has been drawing blood from the pt. Rt arm bruised d/t sticks. Messaged resident on call, Yung Sage MD, to get orders for cathflo so labs can be drawn from PICC. Cathflo ordered and administered in pink lumen. Checked for blood return 45 mintues later, still no blood return. Passed information on to day shift nurse. She stated she will assess again soon.

## 2021-10-05 NOTE — PROGRESS NOTES
4 Eyes Admission Assessment     I agree as the admission nurse that 2 RN's have performed a thorough Head to Toe Skin Assessment on the patient. ALL assessment sites listed below have been assessed on admission. Areas assessed by both nurses: Burton and Staci  [x]   Head, Face, and Ears   [x]   Shoulders, Back, and Chest  [x]   Arms, Elbows, and Hands - bruising rt arm  [x]   Coccyx, Sacrum, and Ischium  [x]   Legs, Feet, and Heels - ankles dry and cracking        Does the Patient have Skin Breakdown?   No         Anson Prevention initiated:  No   Wound Care Orders initiated:  MEGAN Loomis consulted for Pressure Injury (Stage 3,4, Unstageable, DTI, NWPT, and Complex wounds) or Anson score 18 or lower:  No      Nurse 1 eSignature: Electronically signed by Skye Beverly RN on 10/5/21 at 8:20 AM EDT    **SHARE this note so that the co-signing nurse is able to place an eSignature**    Nurse 2 eSignature: Electronically signed by Anthony Cano on 10/5/21 at 11:17 AM EDT

## 2021-10-05 NOTE — PROGRESS NOTES
Patient discharged home with daughter. Reviewed discharge instructions with patient and daughter. R DL PICC removed without complications. Patient denies needs/questions.

## 2021-10-05 NOTE — CONSULTS
[address] [facility]  GI Consultation      Patient: Juliana Min  : 1955       Date:  10/5/2021    Subjective:       History of Present Illness  Patient is a 78 y.o.  female admitted with Chest pain [R07.9] who is seen in consult for submassive PE, anemia after anti-coagulation, she had a history of esophageal varix in 2018. She has a PMH of TERRANCE, HTN, MUNIZ with esophageal varix, obesity who presented to the ED with chest pain. She had 4 days of abdominal pain that developed into SOB and mild chest pain that suddenly occurred which prompted her to come to the ED. EKG showing mild ST elevations in the anterior leads. Code STEMI was called. Cath was negative for any ischemia in coronary arteries. CTPA showed massive pulmonary emboli with elevated RV/LV ratio, consistent with right heart strain. Pt had EKOS . She developed acute blood loss anemia and bleeding from catheter sites from heparin gtt. Heparin gtt was stopped and pt was started on Eliquis. Pt was quickly taken off Eliquis given H/H steadily dropped. Pt was started on iron infusion. She cannot have transufusion as she is a [employer]hov[employer] Witness. DVT was found in LE doppler, pt had IVC filter placed 10/4. Pt seen at the bedside. Pt states she follows with Dr. Grant Parra at [facility], her last appointment was couple of years ago. She has some generalized weakness, no dizziness or lightheadedness, no nausea or vomitting. Pt had BM this am, does not know if it was bloody. Denies any hemoptysis.      Past Medical History:   Diagnosis Date    Arthritis     Back pain     Hypertension     Obesity     Sleep apnea       Past Surgical History:   Procedure Laterality Date    COLONOSCOPY  2018    COLONOSCOPY POLYPECTOMY SNARE/COLD BIOPSY TRANSVERSE POLYP performed by Ger Nicolas MD at [facility] IR [address]  10/4/2021    IR IVC FILTER PLACEMENT W IMAGING 10/4/2021 [address] SLEEVE GASTRECTOMY N/A 2/12/2019    ROBOTIC ASSISTED LAPAROSCOPIC SLEEVE GASTRECTOMY, LAPAROSCOPIC HIATAL HERNIA REPAIR performed by Rob Guzman DO at [facility]      UPPER GASTROINTESTINAL ENDOSCOPY N/A 8/17/2018    EGD BIOPSY GASTRIC ANTRUM BX R/O HP BX/COLD SNARE GASATRIC POLYP performed by Rob Guzman DO at [address] [place] [address] N/A 8/17/2018    EGD POLYP SNARE performed by Rob Guzman DO at [facility]      Past Endoscopic History EGD in 2018 with Upper esophagus with single esophageal varix    Admission Meds  No current facility-administered medications on file prior to encounter. Current Outpatient Medications on File Prior to Encounter   Medication Sig Dispense Refill    omeprazole (PRILOSEC) 40 MG delayed release capsule TAKE ONE CAPSULE BY MOUTH DAILY 30 capsule 2    losartan-hydrochlorothiazide (HYZAAR) 50-12.5 MG per tablet Take 1 tablet by mouth daily 90 tablet 3    Multiple Vitamins-Minerals (BARIATRIC FUSION PO) Take 4 tablets by mouth daily      Compression Stockings MISC by Does not apply route 3 pair. Put on in am and off at bed time. 20-30 mm 3 each 0       Patient denies ASA, NSAID use. Allergies  No Known Allergies   Social   Social History     Tobacco Use    Smoking status: Never Smoker    Smokeless tobacco: Never Used   Substance Use Topics    Alcohol use: No     Alcohol/week: 0.0 standard drinks        Family History   Problem Relation Age of Onset    High Blood Pressure Mother     Anxiety Disorder Mother     Arthritis Mother       No family history of colon cancer, Crohn's disease, or ulcerative colitis. Review of Systems  Pertinent items are noted in HPI.        Physical Exam    BP (!) 107/57   Pulse 84   Temp 98.8 °F (37.1 °C) (Oral)   Resp 20   Ht 5' 2\" (1.575 m)   Wt 274 lb 4 oz (124.4 kg)   SpO2 98%   BMI 50.16 kg/m²   General appearance: alert, cooperative, no distress, appears stated age  Anicteric, No Jaundice  Head: Normocephalic, without obvious abnormality  Lungs: clear to auscultation bilaterally, Normal Effort  Heart: regular rate and rhythm, normal S1 and S2, no murmurs or rubs  Abdomen:soft, slightly TTP, bowel sounds normal, no masses, no organomegaly, no guarding  Extremities: atraumatic, no cyanosis or edema  Skin: warm and dry  Neuro: intact  AAOX3      Data Review:    Recent Labs     10/03/21  1850 10/04/21  1109 10/04/21  2003   HGB 9.2* 10.0* 10.2*   HCT 27.3* 29.6* 29.7*     Recent Labs     10/03/21  0543      K 3.4*      CO2 23   BUN 17   CREATININE 0.8     No results for input(s): AST, ALT, ALB, BILIDIR, BILITOT, ALKPHOS in the last 72 hours. No results for input(s): LIPASE, AMYLASE in the last 72 hours. Recent Labs     10/04/21  1226   PROTIME 12.4   INR 1.09     No results for input(s): PTT in the last 72 hours. No results for input(s): OCCULTBLD in the last 72 hours. Imaging Studies:                            Ultrasound:  -              CT-scan of abdomen and pelvis: -                 Assessment:     Active Problems:    Chest pain of uncertain etiology    Hypoxia    Abnormal EKG    Chest pain    Acute pulmonary embolism (HCC)  Resolved Problems:    * No resolved hospital problems. *  Submassive PE  -s/p EKOS  Acute blood loss anemia  -likely 2/2 to bleeding at catheter sites secondary to anticoagulation  DVT  -s/p IVC filter  History of MUNIZ with esophageal varix 2018    Recommendations:   Pt off anticoagulation has IVC filter  Hemoglobin stable around 9-10, no signs of GI bleed  Monitor H/H, pt needs anticoagulation  EGD    Thank you for the opportunity to participate in [facility].

## 2021-10-06 ENCOUNTER — CARE COORDINATION (OUTPATIENT)
Dept: CASE MANAGEMENT | Age: 66
End: 2021-10-06

## 2021-10-06 NOTE — CARE COORDINATION
[facility] Transitions Initial Follow Up Call    Call within 2 business days of discharge: Yes    Patient:  Mimi Day  Patient :  1955  MRN:  [id number]   Reason for Admission:    Discharge Date:  10/5/21  RARS: 10    CTC attempt to reach Pt regarding recent hospital discharge. CTC unable to leave voice recording with call back number requesting a call back / no answer. Follow up appointments:    Future Appointments   Date Time Provider Neil Lee   2022 10:20 AM MERNA Celaya - CNP FF SLEEP MED [facility]       DANIEL Torres, RN  Care Transition Coordinator  Contact Number:  [phone]

## 2021-10-07 ENCOUNTER — CARE COORDINATION (OUTPATIENT)
Dept: CASE MANAGEMENT | Age: 66
End: 2021-10-07

## 2021-10-07 NOTE — CARE COORDINATION
[facility] Transitions Initial Follow Up Call    Call within 2 business days of discharge: Yes    Patient:  Tiffanie Gupta  Patient :  1955  MRN:  [id number]   Reason for Admission:   Chest pain  Discharge Date:  10/5/21  RARS: 10    CTC attempt to reach Pt regarding recent hospital discharge. CTC left voice recording with call back number requesting a call back. Follow up appointments:    Future Appointments   Date Time Provider Neil Lee   2022 10:20 AM MERNA Strickland - CNP FF SLEEP MED [facility]       DANIEL Madison, RN  Care Transition Coordinator  Contact Number:  [phone]

## 2021-10-14 ENCOUNTER — OFFICE VISIT (OUTPATIENT)
Dept: FAMILY MEDICINE CLINIC | Age: 66
End: 2021-10-14
Payer: COMMERCIAL

## 2021-10-14 VITALS
WEIGHT: 268 LBS | DIASTOLIC BLOOD PRESSURE: 84 MMHG | HEART RATE: 103 BPM | HEIGHT: 62 IN | OXYGEN SATURATION: 98 % | SYSTOLIC BLOOD PRESSURE: 122 MMHG | RESPIRATION RATE: 18 BRPM | BODY MASS INDEX: 49.32 KG/M2

## 2021-10-14 DIAGNOSIS — I26.99 ACUTE PULMONARY EMBOLISM WITHOUT ACUTE COR PULMONALE, UNSPECIFIED PULMONARY EMBOLISM TYPE (HCC): Primary | ICD-10-CM

## 2021-10-14 DIAGNOSIS — I82.411 ACUTE DEEP VEIN THROMBOSIS (DVT) OF FEMORAL VEIN OF RIGHT LOWER EXTREMITY (HCC): ICD-10-CM

## 2021-10-14 PROBLEM — I82.419 ACUTE DEEP VEIN THROMBOSIS (DVT) OF FEMORAL VEIN (HCC): Status: ACTIVE | Noted: 2021-10-14

## 2021-10-14 PROCEDURE — 99214 OFFICE O/P EST MOD 30 MIN: CPT | Performed by: INTERNAL MEDICINE

## 2021-10-14 PROCEDURE — 1111F DSCHRG MED/CURRENT MED MERGE: CPT | Performed by: INTERNAL MEDICINE

## 2021-10-14 RX ORDER — LOSARTAN POTASSIUM AND HYDROCHLOROTHIAZIDE 12.5; 5 MG/1; MG/1
1 TABLET ORAL DAILY
Qty: 90 TABLET | Refills: 3 | Status: SHIPPED | OUTPATIENT
Start: 2021-10-14 | End: 2022-02-10 | Stop reason: SDUPTHER

## 2021-10-14 ASSESSMENT — PATIENT HEALTH QUESTIONNAIRE - PHQ9
2. FEELING DOWN, DEPRESSED OR HOPELESS: 0
SUM OF ALL RESPONSES TO PHQ QUESTIONS 1-9: 0
SUM OF ALL RESPONSES TO PHQ9 QUESTIONS 1 & 2: 0
SUM OF ALL RESPONSES TO PHQ QUESTIONS 1-9: 0
SUM OF ALL RESPONSES TO PHQ QUESTIONS 1-9: 0
1. LITTLE INTEREST OR PLEASURE IN DOING THINGS: 0

## 2021-10-14 ASSESSMENT — ENCOUNTER SYMPTOMS
GASTROINTESTINAL NEGATIVE: 1
ALLERGIC/IMMUNOLOGIC NEGATIVE: 1
SHORTNESS OF BREATH: 1

## 2021-10-14 NOTE — PROGRESS NOTES
Post-Discharge Transitional Care Management Services or Hospital Follow Up      Laurel Jensen   YOB: 1955    Date of Office Visit:  10/14/2021  Date of Hospital Admission: 9/30/21  Date of Hospital Discharge: 10/5/21  Readmission Risk Score(high >=14%. Medium >=10%):Readmission Risk Score: 10      Care management risk score Rising risk (score 2-5) and Complex Care (Scores >=6): 1     Non face to face  following discharge, date last encounter closed (first attempt may have been earlier): 10/7/2021  1:15 PM 10/7/2021  1:15 PM    Call initiated 2 business days of discharge: Yes     Patient Active Problem List   Diagnosis    Chronic pain of left knee    Left ankle swelling    Class 2 obesity with alveolar hypoventilation without serious comorbidity with body mass index (BMI) of 37.0 to 37.9 in adult ([address]    Fatigue    Fatty liver disease, nonalcoholic    Essential hypertension    Hiatal hernia with GERD    Idiopathic esophageal varices without bleeding ([address]    Gastric polyp    Obstructive sleep apnea (adult) (pediatric)    Chest pain of uncertain etiology    Hypoxia    Abnormal EKG    Chest pain    Acute pulmonary embolism ([address]       No Known Allergies    Medications listed as ordered at the time of discharge from [facility] Medication Instructions JULIET:    Printed on:10/14/21 1621   Medication Information                      apixaban (ELIQUIS) 5 MG TABS tablet  Take 5 mg by mouth 2 times daily             Compression Stockings MISC  by Does not apply route 3 pair. Put on in am and off at bed time.  20-30 mm             losartan-hydrochlorothiazide (HYZAAR) 50-12.5 MG per tablet  Take 1 tablet by mouth daily             Multiple Vitamins-Minerals (BARIATRIC FUSION PO)  Take 4 tablets by mouth daily             omeprazole (PRILOSEC) 40 MG delayed release capsule  TAKE ONE CAPSULE BY MOUTH DAILY                   Medications marked \"taking\" at this time  Outpatient Medications Marked as Taking for the 10/14/21 encounter (Office Visit) with Maura Henson MD   Medication Sig Dispense Refill    apixaban (ELIQUIS) 5 MG TABS tablet Take 5 mg by mouth 2 times daily      omeprazole (PRILOSEC) 40 MG delayed release capsule TAKE ONE CAPSULE BY MOUTH DAILY 30 capsule 2    losartan-hydrochlorothiazide (HYZAAR) 50-12.5 MG per tablet Take 1 tablet by mouth daily 90 tablet 3    Multiple Vitamins-Minerals (BARIATRIC FUSION PO) Take 4 tablets by mouth daily          Medications patient taking as of now reconciled against medications ordered at time of hospital discharge: Yes    Chief Complaint   Patient presents with    Follow-Up from Hospital       HPI  Acute deep vein thrombosis (DVT) of femoral vein (HCC)   Had Severe burning pain in R upper leg for ~ 1 week before developed sudden SOB and fatigue. Went to hospital and had DVT w/ Large PE. tx w/ anticoagulation (heparin ) but had bleeding issues. Now on Eliquis w/ no new c/o. Pain in legs has resolved. Hosp 9/30-10/5. Acute pulmonary embolism (HCC)   Now on Anticoagulant Eliquis, had Vena cava filter placed. Feeling much better and doing well. Doing home PT twice weekly. Inpatient course: Discharge summary reviewed- see chart. Interval history/Current status: improved. Review of Systems   Constitutional: Positive for fatigue. Respiratory: Positive for shortness of breath. Cardiovascular: Negative. Gastrointestinal: Negative. Endocrine: Negative. Genitourinary: Negative. Musculoskeletal: Positive for arthralgias and myalgias. Skin: Negative. Allergic/Immunologic: Negative. Neurological: Negative. Hematological: Negative. Vitals:    10/14/21 1606   BP: 122/84   Pulse: 103   Resp: 18   SpO2: 98%   Weight: 268 lb (121.6 kg)   Height: 5' 2\" (1.575 m)     Body mass index is 49.02 kg/m².    Wt Readings from Last 3 Encounters:   10/14/21 268 lb (121.6 kg)   10/05/21 274 lb 4 oz (124.4 kg) 07/29/19 206 lb 12.8 oz (93.8 kg)     BP Readings from Last 3 Encounters:   10/14/21 122/84   10/05/21 (!) 112/58   07/29/19 104/70       Physical Exam  Constitutional:       General: She is not in acute distress. Appearance: Normal appearance. She is well-developed. She is obese. She is not ill-appearing, toxic-appearing or diaphoretic. HENT:      Head: Normocephalic and atraumatic. Eyes:      Conjunctiva/sclera: Conjunctivae normal.      Pupils: Pupils are equal, round, and reactive to light. Neck:      Thyroid: No thyroid mass or thyromegaly. Vascular: Normal carotid pulses. No carotid bruit, hepatojugular reflux or JVD. Trachea: Trachea normal. No tracheal deviation. Cardiovascular:      Rate and Rhythm: Normal rate and regular rhythm. No extrasystoles are present. Chest Wall: PMI is not displaced. Pulses: Normal pulses. No decreased pulses. Carotid pulses are 2+ on the right side and 2+ on the left side. Radial pulses are 2+ on the right side and 2+ on the left side. Femoral pulses are 2+ on the right side and 2+ on the left side. Popliteal pulses are 2+ on the right side and 2+ on the left side. Dorsalis pedis pulses are 2+ on the right side and 2+ on the left side. Posterior tibial pulses are 2+ on the right side and 2+ on the left side. Heart sounds: Normal heart sounds, S1 normal and S2 normal. Heart sounds not distant. No murmur heard. No friction rub. No gallop. No S3 or S4 sounds. Pulmonary:      Effort: Pulmonary effort is normal. No tachypnea, bradypnea, accessory muscle usage or respiratory distress. Breath sounds: Normal breath sounds. No decreased breath sounds, wheezing, rhonchi or rales. Chest:      Chest wall: No tenderness. Musculoskeletal:         General: No tenderness. Normal range of motion. Cervical back: Full passive range of motion without pain, normal range of motion and neck supple.  No edema or erythema. No spinous process tenderness or muscular tenderness. Normal range of motion. Right lower leg: Edema (2+) present. Left lower leg: Edema (2+) present. Lymphadenopathy:      Cervical: No cervical adenopathy. Skin:     General: Skin is warm and dry. Capillary Refill: Capillary refill takes less than 2 seconds. Coloration: Skin is not pale. Findings: No abrasion, erythema or rash. Nails: There is no clubbing. Neurological:      General: No focal deficit present. Mental Status: She is alert and oriented to person, place, and time. Mental status is at baseline. She is not disoriented. Cranial Nerves: No cranial nerve deficit. Sensory: No sensory deficit. Motor: Weakness present. No tremor, atrophy or abnormal muscle tone. Coordination: Coordination normal.      Gait: Gait abnormal (walks w walker. ). Deep Tendon Reflexes: Reflexes are normal and symmetric. Psychiatric:         Mood and Affect: Mood normal.         Speech: Speech normal.         Behavior: Behavior normal.         Thought Content: Thought content normal.         Judgment: Judgment normal.             Assessment/Plan:     Acute Deep Vein Thrombosis (Dvt) of Femoral Vein (Hcc). To continue w/ compression stockings or wraps. Acute Pulmonary Embolism (Hcc). Continue Eliquis for 3 months. Re-evaluate and decide whether need to continue, walk daily, elevate legs as needed. May use heating pad on LOW for 30 min at a time if needed for pain. To call if new c/o.         Medical Decision Making: high complexity

## 2021-10-14 NOTE — PATIENT INSTRUCTIONS
Assessment/Plan:     Acute Deep Vein Thrombosis (Dvt) of Femoral Vein (Hcc). To continue w/ compression stockings or wraps. Acute Pulmonary Embolism (Hcc). Continue Eliquis for 3 months. Re-evaluate and decide whether need to continue, walk daily, elevate legs as needed. May use heating pad on LOW for 30 min at a time if needed for pain. To call if new c/o.

## 2021-10-14 NOTE — ASSESSMENT & PLAN NOTE
Had Severe burning pain in R upper leg for ~ 1 week before developed sudden SOB and fatigue. Went to hospital and had DVT w/ Large PE. tx w/ anticoagulation (heparin ) but had bleeding issues. Now on Eliquis w/ no new c/o. Pain in legs has resolved. Hosp 9/30-10/5.

## 2021-10-15 ENCOUNTER — TELEPHONE (OUTPATIENT)
Dept: FAMILY MEDICINE CLINIC | Age: 66
End: 2021-10-15

## 2021-10-15 NOTE — TELEPHONE ENCOUNTER
----- Message from [facility] sent at 10/15/2021 11:24 AM EDT -----  Subject: Message to Provider    QUESTIONS  Information for Provider? db from [facility] [phone]   states pt has had a 5.35 weight gain this week and she is stating her   swelling is decreased and denying SOB and bloating and abnormal feelings   she just wanted to place an FYI message per protocol  ---------------------------------------------------------------------------  --------------  CALL BACK INFO  What is the best way for the office to contact you? OK to leave message on   voicemail  Preferred Call Back Phone Number? [phone]  ---------------------------------------------------------------------------  --------------  SCRIPT ANSWERS  Relationship to Patient?  Third Party  Representative Name? db [facility] [place] [facility]

## 2021-10-15 NOTE — TELEPHONE ENCOUNTER
db from [facility] [phone]   states pt has had a 5.35 weight gain this week and she is stating her   swelling is decreased and denying SOB and bloating and abnormal feelings   she just wanted to place an FYI message per protocol

## 2021-11-11 ENCOUNTER — VIRTUAL VISIT (OUTPATIENT)
Dept: FAMILY MEDICINE CLINIC | Age: 66
End: 2021-11-11
Payer: COMMERCIAL

## 2021-11-11 DIAGNOSIS — Z00.00 ROUTINE GENERAL MEDICAL EXAMINATION AT A HEALTH CARE FACILITY: Primary | ICD-10-CM

## 2021-11-11 PROCEDURE — G0438 PPPS, INITIAL VISIT: HCPCS | Performed by: INTERNAL MEDICINE

## 2021-11-11 ASSESSMENT — PATIENT HEALTH QUESTIONNAIRE - PHQ9
SUM OF ALL RESPONSES TO PHQ QUESTIONS 1-9: 0
1. LITTLE INTEREST OR PLEASURE IN DOING THINGS: 0
SUM OF ALL RESPONSES TO PHQ QUESTIONS 1-9: 0
SUM OF ALL RESPONSES TO PHQ QUESTIONS 1-9: 0
SUM OF ALL RESPONSES TO PHQ9 QUESTIONS 1 & 2: 0
2. FEELING DOWN, DEPRESSED OR HOPELESS: 0

## 2021-11-11 ASSESSMENT — LIFESTYLE VARIABLES: HOW OFTEN DO YOU HAVE A DRINK CONTAINING ALCOHOL: 0

## 2021-11-11 NOTE — PROGRESS NOTES
Medicare Annual Wellness Visit  Name: Fadi Dumont Date: 2021   MRN: [id number] Sex: Female   Age: 78 y.o. Ethnicity: Non- / Non    : 1955 Race: Black / African American      Lizet Gomez is here for Medicare AWV    Screenings for behavioral, psychosocial and functional/safety risks, and cognitive dysfunction are all negative except as indicated below. These results, as well as other patient data from the [address] [facility] form, are documented in Flowsheets linked to this Encounter. No Known Allergies    Prior to Visit Medications    Medication Sig Taking? Authorizing Provider   apixaban (ELIQUIS) 5 MG TABS tablet Take 5 mg by mouth 2 times daily  Historical Provider, MD   losartan-hydroCHLOROthiazide (HYZAAR) 50-12.5 MG per tablet Take 1 tablet by mouth daily  NAVJOT Kidd MD   Multiple Vitamins-Minerals (BARIATRIC FUSION PO) Take 4 tablets by mouth daily  Historical Provider, MD   Compression Stockings MISC by Does not apply route 3 pair. Put on in am and off at bed time.  20-30 mm  NAVJOT Kidd MD       Past Medical History:   Diagnosis Date    Arthritis     Back pain     Hypertension     Obesity     Sleep apnea        Past Surgical History:   Procedure Laterality Date    COLONOSCOPY  2018    COLONOSCOPY POLYPECTOMY SNARE/COLD BIOPSY TRANSVERSE POLYP performed by Edy Joyner MD at [facility]  10/4/2021    IR IVC FILTER PLACEMENT W IMAGING 10/4/2021 [address] SPECIAL PROCEDURES    SLEEVE GASTRECTOMY N/A 2019    ROBOTIC ASSISTED LAPAROSCOPIC SLEEVE GASTRECTOMY, LAPAROSCOPIC HIATAL HERNIA REPAIR performed by Ana Trevino DO at [facility]      UPPER GASTROINTESTINAL ENDOSCOPY N/A 2018    EGD BIOPSY GASTRIC ANTRUM BX R/O HP BX/COLD SNARE GASATRIC POLYP performed by Ana Trevino DO at [facility] N/A 2018    EGD POLYP SNARE performed by Jarret Gomes DO at [facility] ENDOSCOPY       Family History   Problem Relation Age of Onset    High Blood Pressure Mother     Anxiety Disorder Mother     Arthritis Mother        CareTeam (Including outside providers/suppliers regularly involved in providing care):   Patient Care Team:  Aaron Ceballos MD as PCP - General (Internal Medicine)  Aaron Ceballos MD as PCP - [facility] Empaneled Provider  Jenni Gamez MD as Consulting Physician (Sleep Medicine)  MERNA Roper CNP as Nurse Practitioner (Nurse Practitioner)    Wt Readings from Last 3 Encounters:   10/14/21 268 lb (121.6 kg)   10/05/21 274 lb 4 oz (124.4 kg)   07/29/19 206 lb 12.8 oz (93.8 kg)     There were no vitals filed for this visit. There is no height or weight on file to calculate BMI. Based upon direct observation of the patient, evaluation of cognition reveals recent and remote memory intact. Patient's complete Health Risk Assessment and screening values have been reviewed and are found in Flowsheets. The following problems were reviewed today and where indicated follow up appointments were made and/or referrals ordered. Positive Risk Factor Screenings with Interventions:           Health Habits/Nutrition:        Health Habits/Nutrition Interventions:  · Dental exam overdue:  patient encouraged to make appointment with his/her dentist       Personalized Preventive Plan   Current Health Maintenance Status  There is no immunization history for the selected administration types on file for this patient.      Health Maintenance   Topic Date Due    COVID-19 Vaccine (1) Never done    DTaP/Tdap/Td vaccine (1 - Tdap) Never done    Shingles Vaccine (1 of 2) Never done    DEXA (modify frequency per FRAX score)  Never done    Pneumococcal 65+ years Vaccine (1 of 1 - PPSV23) Never done   [employer] Visit (AWV)  Never done    A1C test (Diabetic or Prediabetic)  08/21/2019    Breast cancer screen  07/12/2021    Colon cancer screen colonoscopy  08/17/2021    Flu vaccine (1) 10/14/2022 (Originally 9/1/2021)    Potassium monitoring  10/03/2022    Creatinine monitoring  10/03/2022    Lipid screen  07/29/2024    Hepatitis C screen  Completed    Hepatitis A vaccine  Aged Out    Hepatitis B vaccine  Aged Out    Hib vaccine  Aged Out    Meningococcal (ACWY) vaccine  Aged Out     Recommendations for [employer] Due: see orders and patient instructions/AVS.  . Recommended screening schedule for the next 5-10 years is provided to the patient in written form: see Patient Instructions/AVS.    Jennifer ARREOLA LPN, 54/93/6057, performed the documented evaluation under the direct supervision of the attending physician. Johana Bañuelos, was evaluated through a synchronous (real-time) audio-video encounter. The patient (or guardian if applicable) is aware that this is a billable service. Verbal consent to proceed has been obtained within the past 12 months. The visit was conducted pursuant to the emergency declaration under the [address] [place] [address], [address] [place] [address] authority and the [employer] and [employer] General Act. Patient identification was verified, and a caregiver was present when appropriate. The patient was located in a state where the provider was credentialed to provide care. Total time spent for this encounter: Not billed by time    --Jennifer Howard LPN on 28/86/5348 at 11:10 AM    An electronic signature was used to authenticate this note.

## 2021-11-11 NOTE — PATIENT INSTRUCTIONS
Personalized Preventive Plan for Theone Ion - 11/11/2021  Medicare offers a range of preventive health benefits. Some of the tests and screenings are paid in full while other may be subject to a deductible, co-insurance, and/or copay. Some of these benefits include a comprehensive review of your medical history including lifestyle, illnesses that may run in your family, and various assessments and screenings as appropriate. After reviewing your medical record and screening and assessments performed today your provider may have ordered immunizations, labs, imaging, and/or referrals for you. A list of these orders (if applicable) as well as your Preventive Care list are included within your After Visit Summary for your review. Other Preventive Recommendations:    · A preventive eye exam performed by an eye specialist is recommended every 1-2 years to screen for glaucoma; cataracts, macular degeneration, and other eye disorders. · A preventive dental visit is recommended every 6 months. · Try to get at least 150 minutes of exercise per week or 10,000 steps per day on a pedometer . · Order or download the FREE \"Exercise & Physical Activity: Your Everyday Guide\" from [employer]. Call 4-333.856.2320 or search [employer] online. · You need 3269-8212 mg of calcium and 2691-4172 IU of vitamin D per day. It is possible to meet your calcium requirement with diet alone, but a vitamin D supplement is usually necessary to meet this goal.  · When exposed to the sun, use a sunscreen that protects against both UVA and UVB radiation with an SPF of 30 or greater. Reapply every 2 to 3 hours or after sweating, drying off with a towel, or swimming. · Always wear a seat belt when traveling in a car. Always wear a helmet when riding a bicycle or motorcycle.

## 2021-12-08 ENCOUNTER — OFFICE VISIT (OUTPATIENT)
Dept: INTERNAL MEDICINE CLINIC | Age: 66
End: 2021-12-08
Payer: COMMERCIAL

## 2021-12-08 VITALS
OXYGEN SATURATION: 98 % | HEIGHT: 63 IN | SYSTOLIC BLOOD PRESSURE: 114 MMHG | DIASTOLIC BLOOD PRESSURE: 70 MMHG | HEART RATE: 96 BPM | BODY MASS INDEX: 44.47 KG/M2 | WEIGHT: 251 LBS | TEMPERATURE: 97.1 F

## 2021-12-08 DIAGNOSIS — D64.89 ANEMIA DUE TO OTHER CAUSE, NOT CLASSIFIED: ICD-10-CM

## 2021-12-08 DIAGNOSIS — G47.33 OSA (OBSTRUCTIVE SLEEP APNEA): ICD-10-CM

## 2021-12-08 DIAGNOSIS — I82.4Y3 ACUTE DEEP VEIN THROMBOSIS (DVT) OF PROXIMAL VEIN OF BOTH LOWER EXTREMITIES (HCC): ICD-10-CM

## 2021-12-08 DIAGNOSIS — I26.99 ACUTE MASSIVE PULMONARY EMBOLISM (HCC): ICD-10-CM

## 2021-12-08 DIAGNOSIS — M79.651 ACUTE THIGH PAIN, RIGHT: ICD-10-CM

## 2021-12-08 DIAGNOSIS — I10 PRIMARY HYPERTENSION: Primary | ICD-10-CM

## 2021-12-08 DIAGNOSIS — E55.9 VITAMIN D DEFICIENCY: ICD-10-CM

## 2021-12-08 DIAGNOSIS — Z12.31 SCREENING MAMMOGRAM FOR BREAST CANCER: ICD-10-CM

## 2021-12-08 DIAGNOSIS — R73.03 PREDIABETES: ICD-10-CM

## 2021-12-08 DIAGNOSIS — E66.01 MORBID OBESITY (HCC): ICD-10-CM

## 2021-12-08 LAB
CHP ED QC CHECK: NORMAL
GLUCOSE BLD-MCNC: 104 MG/DL
HBA1C MFR BLD: 5.3 %

## 2021-12-08 PROCEDURE — 99214 OFFICE O/P EST MOD 30 MIN: CPT | Performed by: INTERNAL MEDICINE

## 2021-12-08 PROCEDURE — 82962 GLUCOSE BLOOD TEST: CPT | Performed by: INTERNAL MEDICINE

## 2021-12-08 PROCEDURE — 83036 HEMOGLOBIN GLYCOSYLATED A1C: CPT | Performed by: INTERNAL MEDICINE

## 2021-12-08 SDOH — ECONOMIC STABILITY: FOOD INSECURITY: WITHIN THE PAST 12 MONTHS, THE FOOD YOU BOUGHT JUST DIDN'T LAST AND YOU DIDN'T HAVE MONEY TO GET MORE.: NEVER TRUE

## 2021-12-08 SDOH — ECONOMIC STABILITY: FOOD INSECURITY: WITHIN THE PAST 12 MONTHS, YOU WORRIED THAT YOUR FOOD WOULD RUN OUT BEFORE YOU GOT MONEY TO BUY MORE.: NEVER TRUE

## 2021-12-08 ASSESSMENT — PATIENT HEALTH QUESTIONNAIRE - PHQ9
SUM OF ALL RESPONSES TO PHQ QUESTIONS 1-9: 0
2. FEELING DOWN, DEPRESSED OR HOPELESS: 0
SUM OF ALL RESPONSES TO PHQ QUESTIONS 1-9: 0
1. LITTLE INTEREST OR PLEASURE IN DOING THINGS: 0
SUM OF ALL RESPONSES TO PHQ9 QUESTIONS 1 & 2: 0
SUM OF ALL RESPONSES TO PHQ QUESTIONS 1-9: 0

## 2021-12-08 ASSESSMENT — SOCIAL DETERMINANTS OF HEALTH (SDOH): HOW HARD IS IT FOR YOU TO PAY FOR THE VERY BASICS LIKE FOOD, HOUSING, MEDICAL CARE, AND HEATING?: NOT HARD AT ALL

## 2021-12-08 NOTE — PATIENT INSTRUCTIONS
TAKE MED AS ADVISED    DIET/ EXERCISE.     FOLLOW UP WITHIN 2 MONTHS / AS NEEDED    FOLLOW UP FOR FASTING LABS, X RAY

## 2021-12-08 NOTE — PROGRESS NOTES
SUBJECTIVE:  Yanci Barksdale is a 78 y.o. female HERE FOR   Chief Complaint   Patient presents with    New Patient    Other     PAIN IN HER LFT THIGH       PT HERE TO INITIATE CARE       PREVIOUS PCP: DR To Hernandez  PT HERE WITH DAUGHTER       HTN - TAKING MED. ? DIET / EXERCISE COMPLIANCE. HEADACHE No, DIZZINESS No  PULM EMBOLISM - ON ELIQUIS. PRIOR SOB - RESOLVED. NO CP. FOLLOWING ALSO WITH HEMATOLOGY  DVT - HENRI . ON ELIQUIS. S/P FILTER PLACEMENT  PREDIABETES - LAB D/W PT. DIET / EXERCISE REVIEWED  C/O RT THIGH PAIN - PAST FEW MONTHS. SHARP PAIN. INTERMITTENT. PAIN SCALE 7/10. NO SWELLING, NO REDNESS, NO WARMTH, NO TRAUMA. NO HIP / NO KNEE / NO BACK PAIN  ANEMIA - LAB D/W PT. ? NO FATIGUE. ? COLD INTOLERANCE  VIT D DEF - PREVIOUS LAB D/W PT. NOT ON MED  TERRANCE - ON CPAP - TOLERATING  MORBID OBESITY - DIET / EXERCISE REVIEWED. WT NOTED  NEEDS MAMMOGRAM    DENIES CP, No SOB, No PALPITATIONS, No COUGH, NO F/C  No ABD PAIN, No N/V, No DIARRHEA, No CONSTIPATION, No MELENA, No HEMATOCHEZIA. No DYSURIA, No FREQ, No URGENCY, No HEMATURIA    PMH: REVIEWED AND UPDATED TODAY    PSH: REVIEWED AND UPDATED TODAY    SOCIAL HX: REVIEWED AND UPDATED TODAY    FAMILY HX: REVIEWED AND UPDATED TODAY    ALLERGY:  Patient has no known allergies. MEDS: REVIEWED  Prior to Visit Medications    Medication Sig Taking? Authorizing Provider   apixaban (ELIQUIS) 5 MG TABS tablet Take 5 mg by mouth 2 times daily Yes Historical Provider, MD   losartan-hydroCHLOROthiazide (HYZAAR) 50-12.5 MG per tablet Take 1 tablet by mouth daily Yes El Mccarthy MD   Multiple Vitamins-Minerals (BARIATRIC FUSION PO) Take 4 tablets by mouth daily Yes Historical Provider, MD   Compression Stockings MISC by Does not apply route 3 pair. Put on in am and off at bed time.  20-30 mm Yes El Mccarthy MD     OB/GYN: POST MENOPAUSAL                  LAST PAP SMEAR ?  DATE                  LAST MAMMOGRAM 2019    IMMUNIZATION: STATES DOES NOT TAKE INFLUENZA, NO PNEUMONIA, NO COVID, NO SHINGLES,  TETANUS  ? DATE    ROS: COMPREHENSIVE ROS AS IN HX, REST -VE  History obtained from chart review, daughter and the patient       OBJECTIVE:   NURSING NOTE AND VITALS REVIEWED  /74 (Site: Left Upper Arm, Position: Sitting, Cuff Size: Large Adult)   Pulse 96   Temp 97.1 °F (36.2 °C)   Ht 5' 2.5\" (1.588 m)   Wt 251 lb (113.9 kg)   SpO2 98%   Breastfeeding No   BMI 45.18 kg/m²     NO ACUTE DISTRESS    REPEAT BP: 114/70 (LT), NO ORTHOSTASIS     Body mass index is 45.18 kg/m². HEENT: MILD PALLOR, ANICTERIC, PERRLA, EOMI, NO CONJUNCTIVAL ERYTHEMA,                 NO SINUS TENDERNESS  NECK:  SUPPLE, TRACHEA MIDLINE, NT, NO JVD, NO CB, NO LA, NO TM, NO STIFFNESS  CHEST: RESPY EFFORT NL, GOOD AE, NO W/R/C  HEART: S1S2+ REG, NO M/G/R  ABD: OBESE, SOFT, NT, NO HSM, BS+  EXT: TRACE EDEMA, NT, PULSES +. LINDA'S -VE  NEURO: ALERT AND ORIENTED X 3, NO MENINGEAL SIGNS, NO TREMORS, AMBULATING WITH A WALKER OTHERWISE, NO FOCAL DEFICITS  PSYCH: FAIRLY GOOD AFFECT  BACK: NT, NO ROM, NO CVA TENDERNESS  HIP: + TENDERNESS - RT, ? ROM  THIGH: + TENDERNESS LATERAL RT THIGH       PREVIOUS LABS REVIEWED AND D/W PT    ACCUCHECK: 104    POC HBA1C: 5.3        ASSESSMENT / PLAN:     Diagnosis Orders   1. Primary hypertension  COUNSELLED. CONTINUE MED. LOW NA+ / DASH DIET/ EXERCISE. MONITOR. GOAL </= 130/80  F/U LABS  MAKE CHANGES AS NEEDED. 2. Acute massive pulmonary embolism ([address]  COUNSELLED. CONTINUE ELIQUIS. MONITOR  MAKE CHANGES AS NEEDED. 3. Acute deep vein thrombosis (DVT) of proximal vein of both lower extremities (HCC)  COUNSELLED. ON ANTICOAGULATION - ELIQUIS   AVOID SEDENTARY LIFESTYLE. ADVISED ON COMPREHENSION HOSE  MAKE CHANGES AS NEEDED. 4. Prediabetes  COUNSELLED. ADVISED ON DIET / EXERCISE  ADVISED RISK FACTOR MODIFICATION. MONITOR  MAKE CHANGES AS NEEDED. 5. Acute thigh pain, right  COUNSELLED. ? REFERRED PAIN. EXERCISES. ANALGESICS PRN. MONITOR.   TYLENOL PRN  X RAY TO EVAL  MAKE CHANGES AS NEEDED. 6. Anemia due to other cause, not classified  COUNSELLED. LAB TO EVAL. MONITOR  MAKE CHANGES AS NEEDED. 7. Vitamin D deficiency  COUNSELLED. LAB TO EVAL. READDRESS MED AS NEEDED  MAKE CHANGES AS NEEDED. 8. TERRANCE (obstructive sleep apnea)  COUNSELLED. CONTINUE C PAP - MONITOR. F/U SLEEP MED. ADVISED WT LOSS  MAKE CHANGES AS NEEDED. 9. Morbid obesity ([address]  COUNSELLED. DIET/ EXERCISE ADVISED. LIFESTYLE MODIFICATION. WT LOSS ADVISED. MONITOR AND MAKE CHANGES AS NEEDED. 10. Screening mammogram for breast cancer  COUNSELLED.  ODILIA DIGITAL SCREEN W OR WO CAD BILATERAL ORDERED.MAKE               PT ADVISED ON COVID VACCINE          MEDICATION SIDE EFFECTS D/W PATIENT    RETURN TO CLINIC WITHIN 2 MONTHS / PRN    FOLLOW UP FOR FASTING LABS, X RAY

## 2021-12-15 ENCOUNTER — CLINICAL DOCUMENTATION (OUTPATIENT)
Dept: OTHER | Age: 66
End: 2021-12-15

## 2022-02-10 ENCOUNTER — OFFICE VISIT (OUTPATIENT)
Dept: INTERNAL MEDICINE CLINIC | Age: 67
End: 2022-02-10
Payer: COMMERCIAL

## 2022-02-10 VITALS
SYSTOLIC BLOOD PRESSURE: 120 MMHG | WEIGHT: 254 LBS | TEMPERATURE: 97.8 F | HEART RATE: 96 BPM | OXYGEN SATURATION: 95 % | HEIGHT: 62 IN | DIASTOLIC BLOOD PRESSURE: 78 MMHG | BODY MASS INDEX: 46.74 KG/M2

## 2022-02-10 DIAGNOSIS — E55.9 VITAMIN D DEFICIENCY: ICD-10-CM

## 2022-02-10 DIAGNOSIS — Z78.0 MENOPAUSE: ICD-10-CM

## 2022-02-10 DIAGNOSIS — I10 PRIMARY HYPERTENSION: ICD-10-CM

## 2022-02-10 DIAGNOSIS — G47.33 OSA (OBSTRUCTIVE SLEEP APNEA): ICD-10-CM

## 2022-02-10 DIAGNOSIS — I82.4Y3 ACUTE DEEP VEIN THROMBOSIS (DVT) OF PROXIMAL VEIN OF BOTH LOWER EXTREMITIES (HCC): ICD-10-CM

## 2022-02-10 DIAGNOSIS — Z23 NEED FOR VACCINATION FOR PNEUMOCOCCUS: ICD-10-CM

## 2022-02-10 DIAGNOSIS — E66.01 MORBID OBESITY (HCC): ICD-10-CM

## 2022-02-10 DIAGNOSIS — R73.03 PREDIABETES: Primary | ICD-10-CM

## 2022-02-10 DIAGNOSIS — M15.8 OTHER OSTEOARTHRITIS INVOLVING MULTIPLE JOINTS: ICD-10-CM

## 2022-02-10 DIAGNOSIS — I26.99 ACUTE MASSIVE PULMONARY EMBOLISM (HCC): ICD-10-CM

## 2022-02-10 DIAGNOSIS — E78.5 DYSLIPIDEMIA: ICD-10-CM

## 2022-02-10 LAB
CHP ED QC CHECK: NORMAL
GLUCOSE BLD-MCNC: 113 MG/DL

## 2022-02-10 PROCEDURE — 90732 PPSV23 VACC 2 YRS+ SUBQ/IM: CPT | Performed by: INTERNAL MEDICINE

## 2022-02-10 PROCEDURE — 99214 OFFICE O/P EST MOD 30 MIN: CPT | Performed by: INTERNAL MEDICINE

## 2022-02-10 PROCEDURE — G0009 ADMIN PNEUMOCOCCAL VACCINE: HCPCS | Performed by: INTERNAL MEDICINE

## 2022-02-10 PROCEDURE — 82962 GLUCOSE BLOOD TEST: CPT | Performed by: INTERNAL MEDICINE

## 2022-02-10 RX ORDER — LOSARTAN POTASSIUM AND HYDROCHLOROTHIAZIDE 12.5; 5 MG/1; MG/1
1 TABLET ORAL DAILY
Qty: 90 TABLET | Refills: 3 | Status: CANCELLED | OUTPATIENT
Start: 2022-02-10

## 2022-02-10 RX ORDER — LOSARTAN POTASSIUM AND HYDROCHLOROTHIAZIDE 12.5; 5 MG/1; MG/1
1 TABLET ORAL DAILY
Qty: 90 TABLET | Refills: 1 | Status: SHIPPED | OUTPATIENT
Start: 2022-02-10 | End: 2022-04-06 | Stop reason: SDUPTHER

## 2022-02-10 ASSESSMENT — PATIENT HEALTH QUESTIONNAIRE - PHQ9
SUM OF ALL RESPONSES TO PHQ QUESTIONS 1-9: 0
SUM OF ALL RESPONSES TO PHQ9 QUESTIONS 1 & 2: 0
SUM OF ALL RESPONSES TO PHQ QUESTIONS 1-9: 0
2. FEELING DOWN, DEPRESSED OR HOPELESS: 0
1. LITTLE INTEREST OR PLEASURE IN DOING THINGS: 0

## 2022-02-10 NOTE — PROGRESS NOTES
SUBJECTIVE:  Lili Patino is a 75 y.o. female [address] [place]   Chief Complaint   Patient presents with    Hypertension    Other      PT HERE FOR EVAL       PREDIABETES - LAB D/W PT. DIET / EXERCISE REVIEWED   HTN - TAKING MED. ? DIET / EXERCISE COMPLIANCE. HEADACHE No, DIZZINESS No  DYSLIPIDEMIA - HDL 38. OUTSIDE LAB D/W PT. DIET / EXERCISE REVIEWED  PULM EMBOLISM - ON Inell Means. Monsalve Bill . NO CP. FOLLOWING ALSO WITH HEMATOLOGY  DVT - HENRI . ON ELIQUIS. S/P FILTER PLACEMENT  RT THIGH PAIN - SHARP PAIN. INTERMITTENT. PAIN FREE TODAY. NO SWELLING, NO REDNESS, NO WARMTH, NO TRAUMA. OA - HIP, KNEE NOTED ON X RAY  VIT D DEF - STATES TAKING MED . LAB D/W PT.   TERRANCE - ON CPAP - TOLERATING  MORBID OBESITY - DIET / EXERCISE REVIEWED. WT LOSS NOTED  NEEDS PNEUMOVAX  MENOPAUSE - NEEDS DEXA SCAN     DENIES CP, No SOB, No PALPITATIONS, No COUGH, NO F/C  No ABD PAIN, No N/V, No DIARRHEA, No CONSTIPATION, No MELENA, No HEMATOCHEZIA. No DYSURIA, No FREQ, No URGENCY, No HEMATURIA    PMH: REVIEWED AND UPDATED TODAY    PSH: REVIEWED AND UPDATED TODAY    SOCIAL HX: REVIEWED AND UPDATED TODAY    FAMILY HX: REVIEWED AND UPDATED TODAY    ALLERGY:  Patient has no known allergies. MEDS: REVIEWED    Prior to Visit Medications    Medication Sig Taking? Authorizing Provider   Cholecalciferol (VITAMIN D3) 25 MCG (1000 UT) CAPS Take by mouth Yes Historical Provider, MD   apixaban (ELIQUIS) 5 MG TABS tablet Take 5 mg by mouth 2 times daily Yes Historical Provider, MD   losartan-hydroCHLOROthiazide (HYZAAR) 50-12.5 MG per tablet Take 1 tablet by mouth daily Yes Annabella Swenson MD   Multiple Vitamins-Minerals (BARIATRIC FUSION PO) Take 4 tablets by mouth daily Yes Historical Provider, MD   Compression Stockings MISC by Does not apply route 3 pair. Put on in am and off at bed time.  20-30 mm Yes NAVJOT Hawthorne MD        ROS: COMPREHENSIVE ROS AS IN HX, REST -VE  History obtained from chart review and the patient       OBJECTIVE:   [facility] REVIEWED  /68 (Site: Left Upper Arm, Position: Sitting, Cuff Size: Large Adult)   Pulse 96   Temp 97.8 °F (36.6 °C)   Ht 5' 2.2\" (1.58 m)   Wt 254 lb (115.2 kg)   SpO2 95%   Breastfeeding No   BMI 46.16 kg/m²     NO ACUTE DISTRESS    REPEAT BP: 120/78 (LT), NO ORTHOSTASIS     Body mass index is 46.16 kg/m². HEENT: NO PALLOR, ANICTERIC, PERRLA, EOMI, NO CONJUNCTIVAL ERYTHEMA,                 NO SINUS TENDERNESS  NECK:  SUPPLE, TRACHEA MIDLINE, NT, NO JVD, NO CB, NO LA, NO TM, NO STIFFNESS  CHEST: RESPY EFFORT NL, GOOD AE, NO W/R/C  HEART: S1S2+ REG, NO M/G/R  ABD: OBESE,  SOFT, NT, NO HSM, BS+  EXT: + EDEMA, NT, PULSES +. LINDA'S -VE  NEURO: ALERT AND ORIENTED X 3, NO MENINGEAL SIGNS, NO TREMORS, WALKING WITH A LIMP - NOT USING WALKER TODAY OTHERWISE, NO FOCAL DEFICITS  PSYCH: FAIRLY GOOD AFFECT  BACK: NT, NO ROM, NO CVA TENDERNESS  MSK :  NO LOCALIZED TENDERNESS      PREVIOUS LABS / OUTSIDE LABS / MAMMOGRAM / X RAY REVIEWED AND D/W PT     IMPRESSION:     Moderate to severe right hip joint osteoarthritis. Moderate osteoarthritis in the knee, worst in the medial femorotibial compartment. ACCUCHECK: 113    ASSESSMENT / PLAN:     Diagnosis Orders   1. Prediabetes  COUNSELLED. ADVISED ON DIET / EXERCISE  ADVISED RISK FACTOR MODIFICATION. MONITOR  MAKE CHANGES AS NEEDED. 2. Primary hypertension  COUNSELLED. STABLE. CONTINUE MED. LOW NA+ / DASH DIET/ EXERCISE. MONITOR. GOAL </= 130/80  MAKE CHANGES AS NEEDED. 3. Dyslipidemia  COUNSELLED. ADVISED LOW FAT / CHOL DIET/ EXERCISE.  MONITOR. GOALS D/W PT - HDL > 50. MAKE CHANGES AS NEEDED. 4. Acute massive pulmonary embolism ([address]  COUNSELLED. CONTINUE ELIQUIS  ADVISED RISK FACTOR MODIFICATIONS. MONITOR. MAKE CHANGES AS NEEDED. 5. Acute deep vein thrombosis (DVT) of proximal vein of both lower extremities (HCC)  COUNSELLED. CONTINUE ELIQUIS  ADVISED RISK FACTOR MODIFICATIONS. MONITOR. MAKE CHANGES AS NEEDED       6.  Other osteoarthritis involving multiple joints  COUNSELLED. EXERCISES. ANALGESICS PRN. MONITOR. ENCOURAGED WT LOS  MAKE CHANGES AS NEEDED. 7. Vitamin D deficiency  COUNSELLED. MONITOR ON VIT D SUPPLEMENT. MAKE CHANGES AS NEEDED. 8. TERRANCE (obstructive sleep apnea)  COUNSELLED. CONTINUE C PAP - MONITOR. F/U SLEEP MED  MAKE CHANGES AS NEEDED. Humair[address] Ramírez 9. Morbid obesity ([facility]  COUNSELLED. DIET/ EXERCISE ADVISD. LIFESTYLE MODIFICATION. WT LOSS ADVISED / ENCOURAGED. MONITOR AND MAKE CHANGES AS NEEDED. 10. Need for vaccination for pneumococcus  COUNSELLED. S/E D/W PT. PNEUMOVAX GIVEN. PT TOLERATED. 11. Menopause  COUNSELLED. DEXA SCAN TO EVAL FOR OSTEOPOROSIS. MAKE CHANGES AS NEEDED.                  ADVISED COVID VACCINE        MEDICATION SIDE EFFECTS D/W PATIENT    RETURN TO CLINIC WITHIN 3 MONTHS / PRN    FOLLOW UP FOR DEXA SCAN

## 2022-02-10 NOTE — PATIENT INSTRUCTIONS
TAKE MED AS ADVISED    DIET/ EXERCISE. FOLLOW UP WITHIN 3 MONTHS / AS NEEDED    FOLLOW UP FOR DEXA [facility] Locations - No appointment necessary. @ indicates the location is open Saturdays in addition to Monday through Friday. Call your preferred location for test preparation, business hours and other information you need. [employer] accepts [employer]. [facility]    @ [place] [facility] [address] [place], [address]   Ph: [phone] [facility] Lab Svcs. [address]   Ph: [phone]  @ [facility] [address] [facility]   [facility]   Ph: [phone]    [facility] S[facility] [address]   Ph: [phone] @ [place] [facility] [address]  Ph: [phone] @ [facility] Lab Svcs. [address] Francisco Burrell [facility]   Ph: [phone]    [place]   @ [facility] [address] [address]   Ph: [phone] [facility] [address], [address] [facility]  Ph: [address]:  [address] Lab Svcs[address] [facility]   Ph: [phone]. Lab Svcs.   [address] [facility], [address] [place] [address]   Ph: [phone]

## 2022-02-28 DIAGNOSIS — I10 PRIMARY HYPERTENSION: ICD-10-CM

## 2022-02-28 DIAGNOSIS — R73.03 PREDIABETES: ICD-10-CM

## 2022-02-28 DIAGNOSIS — D64.89 ANEMIA DUE TO OTHER CAUSE, NOT CLASSIFIED: ICD-10-CM

## 2022-02-28 DIAGNOSIS — G47.33 OSA (OBSTRUCTIVE SLEEP APNEA): ICD-10-CM

## 2022-02-28 DIAGNOSIS — E55.9 VITAMIN D DEFICIENCY: ICD-10-CM

## 2022-03-01 LAB
A/G RATIO: 1.4 (ref 1.1–2.2)
ALBUMIN SERPL-MCNC: 4 G/DL (ref 3.4–5)
ALP BLD-CCNC: 82 U/L (ref 40–129)
ALT SERPL-CCNC: 17 U/L (ref 10–40)
ANION GAP SERPL CALCULATED.3IONS-SCNC: 16 MMOL/L (ref 3–16)
AST SERPL-CCNC: 30 U/L (ref 15–37)
BASOPHILS ABSOLUTE: 0 K/UL (ref 0–0.2)
BASOPHILS RELATIVE PERCENT: 0.3 %
BILIRUB SERPL-MCNC: 0.8 MG/DL (ref 0–1)
BUN BLDV-MCNC: 14 MG/DL (ref 7–20)
CALCIUM SERPL-MCNC: 9.2 MG/DL (ref 8.3–10.6)
CHLORIDE BLD-SCNC: 102 MMOL/L (ref 99–110)
CHOLESTEROL, TOTAL: 140 MG/DL (ref 0–199)
CO2: 22 MMOL/L (ref 21–32)
CREAT SERPL-MCNC: 0.6 MG/DL (ref 0.6–1.2)
CREATININE URINE: 196.7 MG/DL (ref 28–259)
EOSINOPHILS ABSOLUTE: 0.2 K/UL (ref 0–0.6)
EOSINOPHILS RELATIVE PERCENT: 3.2 %
FERRITIN: 249.2 NG/ML (ref 15–150)
GFR AFRICAN AMERICAN: >60
GFR NON-AFRICAN AMERICAN: >60
GLUCOSE BLD-MCNC: 105 MG/DL (ref 70–99)
HCT VFR BLD CALC: 37.7 % (ref 36–48)
HDLC SERPL-MCNC: 42 MG/DL (ref 40–60)
HEMOGLOBIN: 12.1 G/DL (ref 12–16)
IRON SATURATION: 46 % (ref 15–50)
IRON: 117 UG/DL (ref 37–145)
LDL CHOLESTEROL CALCULATED: 82 MG/DL
LYMPHOCYTES ABSOLUTE: 0.9 K/UL (ref 1–5.1)
LYMPHOCYTES RELATIVE PERCENT: 17.9 %
MCH RBC QN AUTO: 29.6 PG (ref 26–34)
MCHC RBC AUTO-ENTMCNC: 32 G/DL (ref 31–36)
MCV RBC AUTO: 92.4 FL (ref 80–100)
MICROALBUMIN UR-MCNC: 17.8 MG/DL
MICROALBUMIN/CREAT UR-RTO: 90.5 MG/G (ref 0–30)
MONOCYTES ABSOLUTE: 0.3 K/UL (ref 0–1.3)
MONOCYTES RELATIVE PERCENT: 6.8 %
NEUTROPHILS ABSOLUTE: 3.6 K/UL (ref 1.7–7.7)
NEUTROPHILS RELATIVE PERCENT: 71.8 %
PDW BLD-RTO: 15.6 % (ref 12.4–15.4)
PLATELET # BLD: 250 K/UL (ref 135–450)
PMV BLD AUTO: 7.6 FL (ref 5–10.5)
POTASSIUM SERPL-SCNC: 3.9 MMOL/L (ref 3.5–5.1)
RBC # BLD: 4.08 M/UL (ref 4–5.2)
SODIUM BLD-SCNC: 140 MMOL/L (ref 136–145)
TOTAL IRON BINDING CAPACITY: 254 UG/DL (ref 260–445)
TOTAL PROTEIN: 6.9 G/DL (ref 6.4–8.2)
TRIGL SERPL-MCNC: 80 MG/DL (ref 0–150)
TSH REFLEX: 3.1 UIU/ML (ref 0.27–4.2)
VITAMIN D 25-HYDROXY: 40.5 NG/ML
VLDLC SERPL CALC-MCNC: 16 MG/DL
WBC # BLD: 5.1 K/UL (ref 4–11)

## 2022-03-09 ENCOUNTER — HOSPITAL ENCOUNTER (OUTPATIENT)
Dept: GENERAL RADIOLOGY | Age: 67
Discharge: HOME | End: 2022-03-09
Payer: COMMERCIAL

## 2022-03-09 DIAGNOSIS — Z78.0 MENOPAUSE: ICD-10-CM

## 2022-03-09 PROCEDURE — 77080 DXA BONE DENSITY AXIAL: CPT

## 2022-04-06 DIAGNOSIS — I10 PRIMARY HYPERTENSION: ICD-10-CM

## 2022-04-07 RX ORDER — LOSARTAN POTASSIUM AND HYDROCHLOROTHIAZIDE 12.5; 5 MG/1; MG/1
1 TABLET ORAL DAILY
Qty: 90 TABLET | Refills: 0 | Status: SHIPPED | OUTPATIENT
Start: 2022-04-07 | End: 2022-05-11 | Stop reason: SDUPTHER

## 2022-05-11 ENCOUNTER — OFFICE VISIT (OUTPATIENT)
Dept: INTERNAL MEDICINE CLINIC | Age: 67
End: 2022-05-11
Payer: COMMERCIAL

## 2022-05-11 VITALS
SYSTOLIC BLOOD PRESSURE: 120 MMHG | WEIGHT: 244 LBS | HEART RATE: 86 BPM | OXYGEN SATURATION: 95 % | BODY MASS INDEX: 44.9 KG/M2 | DIASTOLIC BLOOD PRESSURE: 70 MMHG | HEIGHT: 62 IN

## 2022-05-11 DIAGNOSIS — I82.4Y3 ACUTE DEEP VEIN THROMBOSIS (DVT) OF PROXIMAL VEIN OF BOTH LOWER EXTREMITIES (HCC): ICD-10-CM

## 2022-05-11 DIAGNOSIS — G47.33 OSA (OBSTRUCTIVE SLEEP APNEA): ICD-10-CM

## 2022-05-11 DIAGNOSIS — E66.01 MORBID OBESITY (HCC): ICD-10-CM

## 2022-05-11 DIAGNOSIS — I10 PRIMARY HYPERTENSION: Primary | ICD-10-CM

## 2022-05-11 DIAGNOSIS — I26.99 ACUTE MASSIVE PULMONARY EMBOLISM (HCC): ICD-10-CM

## 2022-05-11 DIAGNOSIS — R73.03 PREDIABETES: ICD-10-CM

## 2022-05-11 DIAGNOSIS — E78.5 DYSLIPIDEMIA: ICD-10-CM

## 2022-05-11 DIAGNOSIS — E55.9 VITAMIN D DEFICIENCY: ICD-10-CM

## 2022-05-11 DIAGNOSIS — M79.674 GREAT TOE PAIN, RIGHT: ICD-10-CM

## 2022-05-11 PROCEDURE — 99214 OFFICE O/P EST MOD 30 MIN: CPT | Performed by: INTERNAL MEDICINE

## 2022-05-11 RX ORDER — LOSARTAN POTASSIUM AND HYDROCHLOROTHIAZIDE 12.5; 5 MG/1; MG/1
1 TABLET ORAL DAILY
Qty: 90 TABLET | Refills: 0 | Status: SHIPPED | OUTPATIENT
Start: 2022-05-11 | End: 2022-08-11 | Stop reason: SDUPTHER

## 2022-05-11 ASSESSMENT — PATIENT HEALTH QUESTIONNAIRE - PHQ9
SUM OF ALL RESPONSES TO PHQ9 QUESTIONS 1 & 2: 0
SUM OF ALL RESPONSES TO PHQ QUESTIONS 1-9: 0
1. LITTLE INTEREST OR PLEASURE IN DOING THINGS: 0
2. FEELING DOWN, DEPRESSED OR HOPELESS: 0
SUM OF ALL RESPONSES TO PHQ QUESTIONS 1-9: 0

## 2022-05-11 NOTE — PROGRESS NOTES
SUBJECTIVE:  Joyce Coley is a 75 y.o. female [address] [place]   Chief Complaint   Patient presents with    Hypertension    Toe Pain     rigth foot toe pain      PT HERE FOR EVAL        HTN - TAKING MED. ? DIET / EXERCISE COMPLIANCE. HEADACHE No, DIZZINESS No  DYSLIPIDEMIA -  DIET / EXERCISE REVIEWED. LAB D/W PT.  PREDIABETES - LAB D/W PT. DIET / EXERCISE REVIEWED   PULM EMBOLISM - ON ELIQUIS. NO SOB . NO CP. FOLLOWING WITH HEMATOLOGY  DVT - HENRI . ON ELIQUIS. S/P FILTER PLACEMENT. NO LEG SWELLING, NO PAIN  C/O GREAT TOE DISCOMFORT - RT. 2-3 WEEKS AGO. ? SWELLING. ? Blade Bond. ? PAIN SCALE. DENIES TRAUMA  VIT D DEF - TAKING MED . LAB D/W PT.   TERRANCE - ON CPAP - TOLERATING  MORBID OBESITY - DIET / EXERCISE REVIEWED. WT LOSS NOTED       DENIES CP, No SOB, No PALPITATIONS, [facility] [address], NO F/C  No ABD PAIN, No N/V, No DIARRHEA, No CONSTIPATION, No MELENA, No HEMATOCHEZIA. No DYSURIA, No FREQ, No URGENCY, No HEMATURIA      PMH: REVIEWED AND UPDATED TODAY    PSH: REVIEWED AND UPDATED TODAY    SOCIAL HX: REVIEWED AND UPDATED TODAY    FAMILY HX: REVIEWED AND UPDATED TODAY    ALLERGY:  Patient has no known allergies. MEDS: REVIEWED  Prior to Visit Medications    Medication Sig Taking? Authorizing Provider   losartan-hydroCHLOROthiazide (HYZAAR) 50-12.5 MG per tablet Take 1 tablet by mouth daily Yes Gallito Amaya MD   Cholecalciferol (VITAMIN D3) 25 MCG (1000 UT) CAPS Take by mouth Yes Historical Provider, MD   apixaban (ELIQUIS) 5 MG TABS tablet Take 5 mg by mouth 2 times daily Yes Historical Provider, MD   Multiple Vitamins-Minerals (BARIATRIC FUSION PO) Take 4 tablets by mouth daily Yes Historical Provider, MD   Compression Stockings MISC by Does not apply route 3 pair. Put on in am and off at bed time.  20-30 mm  C Bety Perez MD       ROS: COMPREHENSIVE ROS AS IN HX, REST -VE  History obtained from chart review and the patient       OBJECTIVE:   NURSING NOTE AND VITALS REVIEWED  /62 (Site: Right Upper Arm, Position: Sitting)   Pulse 86   Ht 5' 2\" (1.575 m)   Wt 244 lb (110.7 kg)   SpO2 95%   BMI 44.63 kg/m²     NO ACUTE DISTRESS    REPEAT BP:  120/70 (RT), NO ORTHOSTASIS     Body mass index is 44.63 kg/m². HEENT: NO PALLOR, ANICTERIC, PERRLA, EOMI, NO CONJUNCTIVAL ERYTHEMA,                 NO SINUS TENDERNESS  NECK:  SUPPLE, TRACHEA MIDLINE, NT, NO JVD, NO CB, NO LA, NO TM, NO STIFFNESS  CHEST: RESPY EFFORT NL, GOOD AE, NO W/R/C  HEART: S1S2+ REG, NO M/G/R  ABD: OBESE, SOFT, NT, NO HSM, BS+  EXT: TRACE EDEMA, NT, PULSES +. LINDA'S -VE  NEURO: ALERT AND ORIENTED X 3, NO MENINGEAL SIGNS, NO TREMORS, NL GAIT, NO FOCAL DEFICITS  PSYCH: FAIRLY GOOD AFFECT  BACK: NT, NO ROM, NO CVA TENDERNESS  FOOT: NO SWELLING, NT, NO ROM, HEALING WOUND MEDIAL RT GREAT TOE, NO ERYTHEMA, NO WARMTH       PREVIOUS LABS / DEXA SCAN REVIEWED AND D/W PT    ASSESSMENT / PLAN:     Diagnosis Orders   1. Primary hypertension  COUNSELLED. CONTINUE MED. LOW NA+ / DASH DIET/ EXERCISE. MONITOR. GOAL </= 130/80  MAKE CHANGES AS NEEDED. 2. Dyslipidemia  COUNSELLED. ADVISED LOW FAT / CHOL DIET/ EXERCISE.  MONITOR. GOALS D/W PT.  MAKE CHANGES AS NEEDED. 3. Prediabetes  COUNSELLED. ADVISED ON DIET / EXERCISE  ADVISED RISK FACTOR MODIFICATION. MONITOR  MAKE CHANGES AS NEEDED. 4. Acute massive pulmonary embolism ([address]  COUNSELLED. CONTINUE ELIQUIS  F/U HEMATOLOGY  MAKE CHANGES AS NEEDED. 5. Acute deep vein thrombosis (DVT) of proximal vein of both lower extremities (HCC)  COUNSELLED. ELIQUIS AS ABOVE  F/U HEMATOLOGY  MAKE CHANGES AS NEEDED. 6. Great toe pain, right / Wound  COUNSELLED. ADVISED ON WOUND CARE  MAINTAIN HYGIENE  MAKE CHANGES AS NEEDED. 7. Vitamin D deficiency  COUNSELLED. MONITOR ON VIT D SUPPLEMENT. MAKE CHANGES AS NEEDED. 8. TERRANCE (obstructive sleep apnea)  COUNSELLED. CONTINUE C PAP - MONITOR. F/U SLEEP MED  ENCOURAGE WT LOSS  MAKE CHANGES AS NEEDED. 9. Morbid obesity ([address]  COUNSELLED. DIET/ EXERCISE ADVISED. LIFESTYLE MODIFICATION. WT LOSS ADVISED/ ENCOURAGED. MONITOR AND MAKE CHANGES AS NEEDED.                          MEDICATION SIDE EFFECTS D/W PATIENT    RETURN TO CLINIC WITHIN 3 MONTHS / PRN

## 2022-05-11 NOTE — PATIENT INSTRUCTIONS
TAKE MED AS ADVISED    DIET/ EXERCISE. FOLLOW UP WITHIN 3 MONTHS / [address] Laboratory Locations - No appointment necessary. @ indicates the location is open Saturdays in addition to Monday through Friday. Call your preferred location for test preparation, business hours and other information you need. [employer] accepts [employer]. [facility]    @ [place] Lab Svcs. [address] Indra, [address]   Ph: [phone] [facility] MOB Lab Svcs. [address]   Ph: [phone]  @ [place] Lab Svcs. 3155 [facility]   Ph: [phone]    [facility] Lab Svcs. [address]ick gorman[address]   Ph: [phone] @ [place] Lab Svcs. [address]  Ph: [phone] @ Kinza Leach MOB Lab Svcs. 835 [facility]. Francisco Burrell [facility]   Ph: [phone]    [place]   @ [facility] Lab Svcs. [address] [place], [place] [address]   Ph: [phone] [place] [facility]. Office Bldg. 3280 [facility], [address] [place] [address]  Ph: [address] [address]   Jorge Luis [address] Lab Svcs. 54 [facility]   Ph: [phone] [facility]. Lab Svcs.   211 [facility], [address] [facility] [address]   Ph: [phone]

## 2022-05-12 ENCOUNTER — TELEPHONE (OUTPATIENT)
Dept: INTERNAL MEDICINE CLINIC | Age: 67
End: 2022-05-12

## 2022-05-12 DIAGNOSIS — Z12.11 SCREEN FOR COLON CANCER: Primary | ICD-10-CM

## 2022-06-06 ENCOUNTER — TELEMEDICINE (OUTPATIENT)
Dept: PULMONOLOGY | Age: 67
End: 2022-06-06
Payer: COMMERCIAL

## 2022-06-06 DIAGNOSIS — E66.2 CLASS 2 OBESITY WITH ALVEOLAR HYPOVENTILATION WITHOUT SERIOUS COMORBIDITY WITH BODY MASS INDEX (BMI) OF 37.0 TO 37.9 IN ADULT (HCC): ICD-10-CM

## 2022-06-06 DIAGNOSIS — G47.33 OBSTRUCTIVE SLEEP APNEA (ADULT) (PEDIATRIC): Primary | ICD-10-CM

## 2022-06-06 DIAGNOSIS — I50.22 CHRONIC SYSTOLIC (CONGESTIVE) HEART FAILURE (HCC): ICD-10-CM

## 2022-06-06 DIAGNOSIS — I10 ESSENTIAL HYPERTENSION: ICD-10-CM

## 2022-06-06 PROCEDURE — 99214 OFFICE O/P EST MOD 30 MIN: CPT | Performed by: NURSE PRACTITIONER

## 2022-06-06 PROCEDURE — 1123F ACP DISCUSS/DSCN MKR DOCD: CPT | Performed by: NURSE PRACTITIONER

## 2022-06-06 ASSESSMENT — SLEEP AND FATIGUE QUESTIONNAIRES
HOW LIKELY ARE YOU TO NOD OFF OR FALL ASLEEP WHILE WATCHING TV: 1
HOW LIKELY ARE YOU TO NOD OFF OR FALL ASLEEP WHILE LYING DOWN TO REST IN THE AFTERNOON WHEN CIRCUMSTANCES PERMIT: 1
ESS TOTAL SCORE: 5
HOW LIKELY ARE YOU TO NOD OFF OR FALL ASLEEP WHILE SITTING AND TALKING TO SOMEONE: 1
HOW LIKELY ARE YOU TO NOD OFF OR FALL ASLEEP WHILE SITTING AND READING: 1
HOW LIKELY ARE YOU TO NOD OFF OR FALL ASLEEP WHILE SITTING INACTIVE IN A PUBLIC PLACE: 0
HOW LIKELY ARE YOU TO NOD OFF OR FALL ASLEEP WHEN YOU ARE A PASSENGER IN A CAR FOR AN HOUR WITHOUT A BREAK: 0
HOW LIKELY ARE YOU TO NOD OFF OR FALL ASLEEP IN A CAR, WHILE STOPPED FOR A FEW MINUTES IN TRAFFIC: 0
HOW LIKELY ARE YOU TO NOD OFF OR FALL ASLEEP WHILE SITTING QUIETLY AFTER LUNCH WITHOUT ALCOHOL: 1

## 2022-06-06 NOTE — LETTER
[facility]  [address] [facility]  Caro Hughes 23 [address]  Phone: [phone]  Fax: [phone]    June 6, 2022       Patient: Rick Macias   MR Number: [id number]   YOB: 1955   Date of Visit: 6/6/2022       Trenton Driver was seen for a follow up visit today. Here is my assessment and plan as well as an attached copy of her visit today:    Chronic systolic (congestive) heart failure   Chronic- Stable. Discussed the importance of treating obstructive sleep apnea as part of the management of this disorder. Cont any meds per PCP and other physicians. Essential hypertension   Chronic- Stable. Discussed the importance of treating obstructive sleep apnea as part of the management of this disorder. Cont any meds per PCP and other physicians. Class 2 obesity with alveolar hypoventilation without serious comorbidity with body mass index (BMI) of 37.0 to 37.9 in adult [facility])   Chronic-not stable:  Discussed importance of treating obstructive sleep apnea and getting sufficient sleep to assist with weight control. Encouraged her to work on weight loss through diet and exercise. Recommended DASH or Mediterranean diets. Obstructive sleep apnea (adult) (pediatric)  Chronic-with progression/exacerbation: Reviewed and analyzed results of physiologic download from patient's machine and reviewed with patient. Supplies and parts as needed for her machine. These are medically necessary. Limit caffeine use after 3pm. Based on the analyzed data will continue with current settings. Encouraged consistent use of her machine each night, all night. Discussed the importance of treating TERRANCE from a physiological standpoint especially given her cardiac history. Instructed not to drive unless had 4 hrs of effective therapy for her TERRANCE the night before.   Did review the risks of under or untreated TERRANCE including, but not limited to, higher risks of motor vehicle accidents, stroke, heart attacks, and death. She understands and accepts all these risks. Will see her back in 6 months to monitor compliance. Patient to call sooner if any issues arise. Discussed the recall of Repironics devices with patient and the severity of her sleep apnea. Discussed the risks of untreated sleep apnea including but not limited to car accidents, heart attacks, strokes, and death. Alternative therapy may not exist or may be severely limited. Felt the benefits of continued usage of these devices may outweigh the risks identified in the recall notification. Advised to avoid use of unproven cleaning methods, such as ozone. Due to the unknown variables each patient will have to make a determination in his/her own. Please contact your equipment company for further instruction until an alternative is found. Encouraged patient to register her machine for the recall and provided phone number. If you have questions or concerns, please do not hesitate to call me. I look forward to following Unknown Kehr along with you.     Sincerely,    MERNA Mares providers:  Ana Laura Villeda MD  [address]  [address]

## 2022-06-06 NOTE — PROGRESS NOTES
Diagnosis: [x] TERRANCE (G47.33) [] CSA (G47.31) [] Apnea (G47.30)   Length of Need: [x] 15 Months [] 99 Months [] Other:   Machine (COLLIN!): [] Respironics Dream Station      Auto [] ResMed AirSense     Auto [] Other:     []  CPAP () [] Bilevel ()   Mode: [] Auto [] Spontaneous    Mode: [] Auto [] Spontaneous            Comfort Settings:      Humidifier: [] Heated ()        [x] Water chamber replacement ()/ 1 per 6 months        Mask:   [x] Nasal () /1 per 3 months [] Full Face () /1 per 3 months   [x] Patient choice -Size and fit mask [] Patient Choice - Size and fit mask   [] Dispense: [] Dispense:   [x] Headgear () / 1 per 3 months [] Headgear () / 1 per 3 months   [x] Replacement Nasal Cushion ()/2 per month [] Interface Replacement ()/1 per month   [] Replacement Nasal Pillows ()/2 per month         Tubing: [x] Heated ()/1 per 3 months    [] Standard ()/1 per 3 months [] Other:           Filters: [x] Non-disposable ()/1 per 6 months     [x] Ultra-Fine, Disposable ()/2 per month        Miscellaneous: [] Chin Strap ()/ 1 per 6 months [] O2 bleed-in:        LPM   [] Oxymetry on CPAP/Bilevel []  Other:         Start Order Date: 06/06/22    MEDICAL JUSTIFICATION:  I, the undersigned, certify that the above prescribed supplies are medically necessary for this patients wellbeing. In my opinion, the supplies are both reasonable and necessary in reference to accepted standards of medicalpractice in treatment of this patients condition. Bozena Rosario NP    NPI: [id number]       Order Signed Date: 06/06/22  350 [facility]  Pulmonary, Sleep, and Critical Care    Pulmonary, Sleep, and Critical Care  [address] [place] [address].  [facility], 152 [facility] , 800 [facility]  Phone: [phone]    Fax: 727 [facility]  1955  [address] [place] [address]  [phone] (home)   [phone] (mobile)      Insurance Info (confirm with patient if correct):  Payor/Plan Subscr  Sex Relation Sub.  Ins. ID Effective Group Num

## 2022-06-06 NOTE — ASSESSMENT & PLAN NOTE
Chronic-with progression/exacerbation: Reviewed and analyzed results of physiologic download from patient's machine and reviewed with patient. Supplies and parts as needed for her machine. These are medically necessary. Limit caffeine use after 3pm. Based on the analyzed data will continue with current settings. Encouraged consistent use of her machine each night, all night. Discussed the importance of treating TERRANCE from a physiological standpoint especially given her cardiac history. Instructed not to drive unless had 4 hrs of effective therapy for her TERRANCE the night before. Did review the risks of under or untreated TERRANCE including, but not limited to, higher risks of motor vehicle accidents, stroke, heart attacks, and death. She understands and accepts all these risks. Will see her back in 6 months to monitor compliance. Patient to call sooner if any issues arise. Discussed the recall of Repironics devices with patient and the severity of her sleep apnea. Discussed the risks of untreated sleep apnea including but not limited to car accidents, heart attacks, strokes, and death. Alternative therapy may not exist or may be severely limited. Felt the benefits of continued usage of these devices may outweigh the risks identified in the recall notification. Advised to avoid use of unproven cleaning methods, such as ozone. Due to the unknown variables each patient will have to make a determination in his/her own. Please contact your equipment company for further instruction until an alternative is found. Encouraged patient to register her machine for the recall and provided phone number.

## 2022-06-06 NOTE — PROGRESS NOTES
Faviola Burnham [address], [address] [facility]- [phone]   [facility] Leonia Osgood. [address] [place] [address]. Cate Franco 37 [phone]     Video Visit- Follow up    Keven Gupta, was evaluated through a synchronous (real-time) audio-video  encounter. The patient (or guardian if applicable) is aware that this is a billable  service, which includes applicable co-pays. This Virtual Visit was conducted with  patient's (and/or legal guardian's) consent. The visit was conducted pursuant to  the emergency declaration under the [address] [place] [address] waiver authority and the [place] [employer] Act. Patient identification was verified,  and a caregiver was present when appropriate. The patient was located in a  state where the provider was licensed to provide care. Assessment/Plan:      1. Obstructive sleep apnea (adult) (pediatric)  Assessment & Plan:  Chronic-with progression/exacerbation: Reviewed and analyzed results of physiologic download from patient's machine and reviewed with patient. Supplies and parts as needed for her machine. These are medically necessary. Limit caffeine use after 3pm. Based on the analyzed data will continue with current settings. Encouraged consistent use of her machine each night, all night. Discussed the importance of treating TERRANCE from a physiological standpoint especially given her cardiac history. Instructed not to drive unless had 4 hrs of effective therapy for her TERRANCE the night before. Did review the risks of under or untreated TERRANCE including, but not limited to, higher risks of motor vehicle accidents, stroke, heart attacks, and death. She understands and accepts all these risks. Will see her back in 6 months to monitor compliance. Patient to call sooner if any issues arise.     Discussed the recall of Repironics devices with patient and the severity of her sleep apnea. Discussed the risks of untreated sleep apnea including but not limited to car accidents, heart attacks, strokes, and death. Alternative therapy may not exist or may be severely limited. Felt the benefits of continued usage of these devices may outweigh the risks identified in the recall notification. Advised to avoid use of unproven cleaning methods, such as ozone. Due to the unknown variables each patient will have to make a determination in his/her own. Please contact your equipment company for further instruction until an alternative is found. Encouraged patient to register her machine for the recall and provided phone number. 2. Chronic systolic (congestive) heart failure  Assessment & Plan:   Chronic- Stable. Discussed the importance of treating obstructive sleep apnea as part of the management of this disorder. Cont any meds per PCP and other physicians. 3. Essential hypertension  Assessment & Plan:   Chronic- Stable. Discussed the importance of treating obstructive sleep apnea as part of the management of this disorder. Cont any meds per PCP and other physicians. 4. Class 2 obesity with alveolar hypoventilation without serious comorbidity with body mass index (BMI) of 37.0 to 37.9 in adult [facility])  Assessment & Plan:   Chronic-not stable:  Discussed importance of treating obstructive sleep apnea and getting sufficient sleep to assist with weight control. Encouraged her to work on weight loss through diet and exercise. Recommended DASH or Mediterranean diets. Reviewed, analyzed, and documented physiologic data from patient's PAP machine. This information was analyzed to assess complexity and medical decision making in regards to further testing and management. The primary encounter diagnosis was Obstructive sleep apnea (adult) (pediatric).  Diagnoses of Chronic systolic (congestive) heart failure, Essential hypertension, and Class 2 obesity with alveolar hypoventilation without serious comorbidity with body mass index (BMI) of 37.0 to 37.9 in adult [facility]) were also pertinent to this visit. The chronic medical conditions listed are directly related to the primary diagnosis listed above. The management of the primary diagnosis affects the secondary diagnosis and vice versa. Subjective:     Patient ID: Diane Lopez is a 75 y.o. female. Chief Complaint   Patient presents with    Sleep Apnea     Subjective   HPI:    Machine Modem/Download Info:  Compliance (hours/night): 8 hrs/night  % of nights >= 4 hrs: 38.9 %  Download AHI (/hour): 0.9 /HR  Average CPAP Pressure : 10.2 cmH2O   APAP - Settings  Pressure Min: 7 cmH2O  Pressure Max: 17 cmH2O                 Comfort Settings  Humidity Level (0-8): 5  Heated Tubing (Yes/No): Yes  Flex/EPR (0-3): 3 PAP Mask  Mask Type: Nasal mask     Diane Lopez reports she is doing well with her machine. She reports she does not use her machine every night. She has been using her machine every other day. Nothing about the machine bothers her she does sometimes simply does not use it. The pressure on her machine is comfortable and she is waking rested. she denies headaches, congestion, nosebleeds, dryness, aerophagia, or drowsiness while driving. Her mask is comfortable and is fitting well. She has registered her machine for the manufacture recall and is currently awaiting replacement. [facility]    [place] - Total score: 5    Current Outpatient Medications   Medication Instructions    apixaban (ELIQUIS) 5 mg, Oral, 2 TIMES DAILY    Cholecalciferol (VITAMIN D3) 25 MCG (1000 UT) CAPS 1 Can, Oral, DAILY    Compression Stockings MISC Does not apply, 3 pair. Put on in am and off at bed time.  20-30 mm    losartan-hydroCHLOROthiazide (HYZAAR) 50-12.5 MG per tablet 1 tablet, Oral, DAILY    Multiple Vitamins-Minerals (BARIATRIC FUSION PO) 4 tablets, Oral, DAILY        Electronically signed by John Brand APRN on 6/6/2022 at 11:01 AM

## 2022-07-20 ENCOUNTER — PATIENT MESSAGE (OUTPATIENT)
Dept: BARIATRICS/WEIGHT MGMT | Age: 67
End: 2022-07-20

## 2022-07-20 NOTE — TELEPHONE ENCOUNTER
Attempt to contact for Bariatric Follow Up. [email or web address] message sent and letter mailed to address on file in [address] [place] [address] Rd.

## 2022-08-11 ENCOUNTER — OFFICE VISIT (OUTPATIENT)
Dept: INTERNAL MEDICINE CLINIC | Age: 67
End: 2022-08-11
Payer: COMMERCIAL

## 2022-08-11 VITALS
WEIGHT: 238 LBS | DIASTOLIC BLOOD PRESSURE: 80 MMHG | HEART RATE: 81 BPM | SYSTOLIC BLOOD PRESSURE: 118 MMHG | OXYGEN SATURATION: 98 % | BODY MASS INDEX: 43.53 KG/M2

## 2022-08-11 DIAGNOSIS — I82.4Y3 ACUTE DEEP VEIN THROMBOSIS (DVT) OF PROXIMAL VEIN OF BOTH LOWER EXTREMITIES (HCC): ICD-10-CM

## 2022-08-11 DIAGNOSIS — I26.99 ACUTE MASSIVE PULMONARY EMBOLISM (HCC): ICD-10-CM

## 2022-08-11 DIAGNOSIS — I10 PRIMARY HYPERTENSION: ICD-10-CM

## 2022-08-11 DIAGNOSIS — M79.651 ACUTE THIGH PAIN, RIGHT: ICD-10-CM

## 2022-08-11 DIAGNOSIS — R60.0 LOCALIZED EDEMA: ICD-10-CM

## 2022-08-11 DIAGNOSIS — E55.9 VITAMIN D DEFICIENCY: ICD-10-CM

## 2022-08-11 DIAGNOSIS — R73.03 PREDIABETES: Primary | ICD-10-CM

## 2022-08-11 DIAGNOSIS — E78.5 DYSLIPIDEMIA: ICD-10-CM

## 2022-08-11 DIAGNOSIS — G47.33 OSA (OBSTRUCTIVE SLEEP APNEA): ICD-10-CM

## 2022-08-11 LAB
CHP ED QC CHECK: NORMAL
GLUCOSE BLD-MCNC: 115 MG/DL
HBA1C MFR BLD: 5.3 %

## 2022-08-11 PROCEDURE — 1123F ACP DISCUSS/DSCN MKR DOCD: CPT | Performed by: INTERNAL MEDICINE

## 2022-08-11 PROCEDURE — 82962 GLUCOSE BLOOD TEST: CPT | Performed by: INTERNAL MEDICINE

## 2022-08-11 PROCEDURE — 83036 HEMOGLOBIN GLYCOSYLATED A1C: CPT | Performed by: INTERNAL MEDICINE

## 2022-08-11 PROCEDURE — 99214 OFFICE O/P EST MOD 30 MIN: CPT | Performed by: INTERNAL MEDICINE

## 2022-08-11 RX ORDER — LOSARTAN POTASSIUM AND HYDROCHLOROTHIAZIDE 12.5; 5 MG/1; MG/1
1 TABLET ORAL DAILY
Qty: 90 TABLET | Refills: 0 | Status: SHIPPED | OUTPATIENT
Start: 2022-08-11

## 2022-08-11 RX ORDER — FUROSEMIDE 20 MG/1
20 TABLET ORAL DAILY PRN
Qty: 30 TABLET | Refills: 0 | Status: SHIPPED | OUTPATIENT
Start: 2022-08-11

## 2022-08-11 NOTE — PROGRESS NOTES
SUBJECTIVE:  Noreene Habermann is a 75 y.o. female HERE FOR   Chief Complaint   Patient presents with    Follow-up      PT HERE FOR EVAL     PREDIABETES -  DIET / EXERCISE REVIEWED. LAB D/W PT. HTN - TAKING MED. ? DIET / EXERCISE COMPLIANCE. HEADACHE No, DIZZINESS No  DYSLIPIDEMIA -  DIET / EXERCISE REVIEWED. LAB D/W PT. PULM EMBOLISM - ON ELIQUIS. NO SOB . NO CP. FOLLOWING WITH HEMATOLOGY  DVT - HENRI . ON ELIQUIS. S/P FILTER PLACEMENT. + LEG SWELLING, NO PAIN  C/O RT THIGH PAIN - PAST 2 MONTHS. INTERMITTENT, ? XTER, PAIN SCALE 5/10. NO SWELLING, DENIES TRAUMA  VIT D DEF - TAKING MED . LAB D/W PT.  TERRANCE - ON CPAP - TOLERATING  GREAT TOE DISCOMFORT / WOUND - RESOLVED. DENIES CP, No SOB, No PALPITATIONS, No COUGH, NO F/C  No ABD PAIN, No N/V, No DIARRHEA, No CONSTIPATION, No MELENA, No HEMATOCHEZIA. No DYSURIA, No FREQ, No URGENCY, No HEMATURIA    PMH: REVIEWED AND UPDATED TODAY    PSH: REVIEWED AND UPDATED TODAY    SOCIAL HX: REVIEWED AND UPDATED TODAY    FAMILY HX: REVIEWED AND UPDATED TODAY    ALLERGY:  Patient has no known allergies. MEDS: REVIEWED  Prior to Visit Medications    Medication Sig Taking? Authorizing Provider   losartan-hydroCHLOROthiazide (HYZAAR) 50-12.5 MG per tablet Take 1 tablet by mouth daily Yes Arabella Negro MD   Cholecalciferol (VITAMIN D3) 25 MCG (1000 UT) CAPS Take 1 Can by mouth daily Yes Arabella Negro MD   apixaban (ELIQUIS) 5 MG TABS tablet Take 5 mg by mouth 2 times daily Yes Historical Provider, MD   Multiple Vitamins-Minerals (BARIATRIC FUSION PO) Take 4 tablets by mouth daily Yes Historical Provider, MD   Compression Stockings MISC by Does not apply route 3 pair. Put on in am and off at bed time.  20-30 mm Yes NAVJOT So MD       ROS: COMPREHENSIVE ROS AS IN HX, REST -VE  History obtained from chart review and the patient       OBJECTIVE:   NURSING NOTE AND VITALS REVIEWED  /88 (Site: Left Upper Arm, Position: Sitting, Cuff Size: Medium Adult)   Pulse 81 Wt 238 lb (108 kg)   SpO2 98%   BMI 43.53 kg/m²     NO ACUTE DISTRESS    REPEAT BP: 118/80 (LT), NO ORTHOSTASIS     Body mass index is 43.53 kg/m². HEENT: NO PALLOR, ANICTERIC, PERRLA, EOMI, NO CONJUNCTIVAL ERYTHEMA,                 NO SINUS TENDERNESS  NECK:  SUPPLE, TRACHEA MIDLINE, NT, NO JVD, NO CB, NO LA, NO TM, NO STIFFNESS  CHEST: RESPY EFFORT NL, GOOD AE, NO W/R/C  HEART: S1S2+ REG, NO M/G/R  ABD: OBESE, SOFT, NT, NO HSM, BS+  EXT: + EDEMA, NT, PULSES +. LINDA'S -VE, NO ERYTHEMA, NO WARMTH  NEURO: ALERT AND ORIENTED X 3, NO MENINGEAL SIGNS, NO TREMORS, NL GAIT, NO FOCAL DEFICITS  PSYCH: FAIRLY GOOD AFFECT  BACK: NT, NO ROM, NO CVA TENDERNESS  HIGH: + TENDERNESS LATERAL MID RT THIGH, NO SWELLING, NO ERYTHEMA, NO WARMTH     PREVIOUS LABS REVIEWED AND D/W PT      ACCUCHECK: 115    POC HBA1C: 5.3    ASSESSMENT / PLAN:     Diagnosis Orders   1. Prediabetes  COUNSELLED. STABLE. ADVISED ON DIET / EXERCISE  ADVISED RISK FACTOR MODIFICATION. MONITOR  MAKE CHANGES AS NEEDED. 2. Primary hypertension  COUNSELLED. CONTINUE MED. LOW NA+ / DASH DIET/ EXERCISE. MONITOR. GOAL </= 130/80  F/U LAB  MAKE CHANGES AS NEEDED. 3. Dyslipidemia  COUNSELLED. STABLE. ADVISED LOW FAT / CHOL DIET/ EXERCISE.  MONITOR. GOALS D/W PT.  MAKE CHANGES AS NEEDED. 4. Acute massive pulmonary embolism ([address]  COUNSELLED . CONTINUE ELIQUIS. MONITOR  F/U HEMATOLOGY  MAKE CHANGES AS NEEDED. 5. Acute deep vein thrombosis (DVT) of proximal vein of both lower extremities (HCC)  COUNSELLED . ELIQUIS AS ABOVE. MONITOR  F/U HEMATOLOGY  MAKE CHANGES AS NEEDED. 6. Acute thigh pain, right  COUNSELLED. EXERCISES. ANALGESICS PRN. MONITOR. READDRESS TYLENOL PRN  MAKE CHANGES AS NEEDED. 7. Vitamin D deficiency  COUNSELLED. MONITOR ON VIT D SUPPLEMENT. MAKE CHANGES AS NEEDED. 8. TERRANCE (obstructive sleep apnea)  COUNSELLED. CONTINUE C PAP - MONITOR. F/U SLEEP MED  MAKE CHANGES AS NEEDED.         9. Localized edema COUNSELLED. LOCALIZED TENDERNESS  ADVISED WARM COMPRESS PRN. ELEVATE LEGS  START ON LASIX 20 MG DAILY / PRN - S/E D/W PT. MONITOR LAB  MAKE CHANGES AS NEEDED.                          MEDICATION SIDE EFFECTS D/W PATIENT    RETURN TO CLINIC WITHIN 3 MONTHS / PRN    FOLLOW UP FOR LABS

## 2022-08-11 NOTE — PATIENT INSTRUCTIONS
Problem: Infection  Goal: Will remain free from infection  Outcome: PROGRESSING AS EXPECTED  Rsv, inf a/b by pcr and resp dfa sent.    Problem: Knowledge Deficit  Goal: Knowledge of disease process/condition, treatment plan, diagnostic tests, and medications will improve  Outcome: PROGRESSING AS EXPECTED  Need for high flow and cont albuterol.       TAKE MED AS ADVISED    DIET/ EXERCISE. FOLLOW UP WITHIN 3 MONTHS / AS NEEDED    FOLLOW UP  [facility] Laboratory Locations - No appointment necessary. @ indicates the location is open Saturdays in addition to Monday through Friday. Call your preferred location for test preparation, business hours and other information you need. [employer] accepts [employer]. [facility]    @ [place] Lab Svcs. 3 [facility] [address] [place] [address]. Indra, [address]   Ph: [phone] [facility] Lab Svcs. [address] [place] [address]   Ph: [phone]  @ [facility] Lab Svcs. 3155 [facility]   Ph: [phone]    [facility] Lab Svcs. [address]   Ph: [phone] @ [place] Lab Svcs. [address]  Ph: [phone] @ [facility] Lab Svcs. [address] [facility] [address] Francisco Burrell [address]   Ph: [phone]     Gennaro Najjar Svcs. [place] [address]  Ph: [phone]    [place]   @ [place] Lab Svcs. [address] [place], [place] [address]   Ph: [phone] [facility] Med. Office Bldg. [address] [facility], 800 [facility]  Ph: [address] [facility] 95, [phone]   [facility] 30:  [address] Lab Svcs. 54 [facility]   Ph: [phone] [facility]. Lab Svcs.   211 [facility], [facility]   Ph: [phone]

## 2022-10-17 ENCOUNTER — TELEPHONE (OUTPATIENT)
Dept: INTERNAL MEDICINE CLINIC | Age: 67
End: 2022-10-17

## 2022-10-17 NOTE — TELEPHONE ENCOUNTER
----- Message from [facility] sent at 10/17/2022  1:16 PM EDT -----  Subject: Message to Provider    QUESTIONS  Information for Provider? pt has a appt 11-11 would like an covid test   done before procedure.   ---------------------------------------------------------------------------  --------------  Burton Hunter INFO  [phone]; OK to leave message on voicemail  ---------------------------------------------------------------------------  --------------  SCRIPT ANSWERS  Relationship to Patient?  Self

## 2022-10-18 NOTE — TELEPHONE ENCOUNTER
CC: followup of hypertension  HPI:  The patient is a 73 y.o. year old female who presents to the office for followup of hypertension.  The patient denies any chest pain, shortness of breath, headache, blurred vision, excessive fatigue, nausea or vomiting.  She complains of several month history of poor equilibrium.  She has had two episodes of head trauma which she feels may be contributing to poor equilibrium.  She hit her head on a door frame and an overhead kitchen cabinet while emptying the .  Review of labs reveals essentially normal results    PAST MEDICAL HISTORY:  Past Medical History:   Diagnosis Date    Anxiety     Chronic insomnia     HTN (hypertension)     Hyperlipidemia     OP (osteoporosis)        SURGICAL HISTORY:  Past Surgical History:   Procedure Laterality Date    OOPHORECTOMY      TUBAL LIGATION         MEDS:  Medcard reviewed and updated    ALLERGIES: Allergy Card reviewed and updated    SOCIAL HISTORY:   The patient is a nonsmoker.    PE:   APPEARANCE: Well nourished, well developed, in no acute distress.    EYES: Sclerae anicteric. PERRL. EOMI.      EARS: TM's intact. No retraction or perforation.    NOSE: Mucosa pink. Airway clear.  MOUTH & THROAT: No tonsillar enlargement. No pharyngeal erythema or exudate. No stridor.  CHEST: Lungs clear to auscultation with unlabored respirations.  CARDIOVASCULAR: Normal S1, S2. No murmurs. No carotid bruits. No pedal edema.  ABDOMEN: Bowel sounds normal. Not distended. Soft. No tenderness or masses. No organomegaly.  MUSCULOSKELETAL:  Abnormal gait.  NEURO: Cranial nerves are intact.  PSYCHIATRIC: The patient is oriented to person, place, and time and has a pleasant affect.        ASSESSMENT/PLAN:  Tashia was seen today for follow-up.    Diagnoses and all orders for this visit:    Essential hypertension  -     Blood pressure is controlled, continue current medications    Hyperlipidemia, unspecified hyperlipidemia type  -     Controlled,  Pt states she having colonoscopy done. Pt was seeing if she coming in here to get COVID test done? continue Crestor    Osteoporosis, unspecified osteoporosis type, unspecified pathological fracture presence  -       Patient declines therapy  -       Recommend calcium and vitamin D supplementation    Abnormal gait  -     Ambulatory Referral to Neurology    Other orders  -     zolpidem (AMBIEN) 5 MG Tab; Take 1 tablet (5 mg total) by mouth nightly as needed.  -     sertraline (ZOLOFT) 100 MG tablet; Take 1 tablet (100 mg total) by mouth every morning.  -     rosuvastatin (CRESTOR) 10 MG tablet; Take 1 tablet (10 mg total) by mouth every evening.  -     ramipril (ALTACE) 5 MG capsule; Take 1 capsule (5 mg total) by mouth 2 (two) times daily.  -     nitroGLYCERIN (NITROSTAT) 0.4 MG SL tablet; place 1 tablet under the tongue if needed every 5 minutes for chest pain for 3 doses IF NO RELIEF AFTER 3RD DOSE CALL 911.  -     metoprolol succinate (TOPROL-XL) 25 MG 24 hr tablet; Take 1 tablet (25 mg total) by mouth 2 (two) times daily.  -     diazePAM (VALIUM) 5 MG tablet; Take 1 tablet (5 mg total) by mouth once daily.  -     amlodipine (NORVASC) 2.5 MG tablet; Take 1 tablet (2.5 mg total) by mouth once daily.  -     amiloride-hydrochlorothiazide 5-50mg (MODURETIC 5-50) 5-50 mg Tab; take 1/2 tablet by mouth once daily

## 2022-11-04 DIAGNOSIS — R73.03 PREDIABETES: ICD-10-CM

## 2022-11-04 DIAGNOSIS — I10 PRIMARY HYPERTENSION: ICD-10-CM

## 2022-11-04 DIAGNOSIS — E55.9 VITAMIN D DEFICIENCY: ICD-10-CM

## 2022-11-04 DIAGNOSIS — R60.0 LOCALIZED EDEMA: ICD-10-CM

## 2022-11-04 LAB
ANION GAP SERPL CALCULATED.3IONS-SCNC: 13 MMOL/L (ref 3–16)
BUN BLDV-MCNC: 20 MG/DL (ref 7–20)
CALCIUM SERPL-MCNC: 9.6 MG/DL (ref 8.3–10.6)
CHLORIDE BLD-SCNC: 103 MMOL/L (ref 99–110)
CO2: 25 MMOL/L (ref 21–32)
CREAT SERPL-MCNC: 0.7 MG/DL (ref 0.6–1.2)
GFR SERPL CREATININE-BSD FRML MDRD: >60 ML/MIN/{1.73_M2}
GLUCOSE BLD-MCNC: 99 MG/DL (ref 70–99)
POTASSIUM SERPL-SCNC: 4.1 MMOL/L (ref 3.5–5.1)
SODIUM BLD-SCNC: 141 MMOL/L (ref 136–145)
VITAMIN D 25-HYDROXY: 41.9 NG/ML

## 2022-11-11 ENCOUNTER — OFFICE VISIT (OUTPATIENT)
Dept: INTERNAL MEDICINE CLINIC | Age: 67
End: 2022-11-11
Payer: COMMERCIAL

## 2022-11-11 VITALS
WEIGHT: 241.8 LBS | DIASTOLIC BLOOD PRESSURE: 72 MMHG | HEART RATE: 89 BPM | BODY MASS INDEX: 44.23 KG/M2 | OXYGEN SATURATION: 98 % | SYSTOLIC BLOOD PRESSURE: 120 MMHG

## 2022-11-11 DIAGNOSIS — I10 PRIMARY HYPERTENSION: Primary | ICD-10-CM

## 2022-11-11 DIAGNOSIS — R73.03 PREDIABETES: ICD-10-CM

## 2022-11-11 DIAGNOSIS — E78.5 DYSLIPIDEMIA: ICD-10-CM

## 2022-11-11 DIAGNOSIS — E55.9 VITAMIN D DEFICIENCY: ICD-10-CM

## 2022-11-11 DIAGNOSIS — G47.33 OSA (OBSTRUCTIVE SLEEP APNEA): ICD-10-CM

## 2022-11-11 DIAGNOSIS — E66.01 MORBID OBESITY (HCC): ICD-10-CM

## 2022-11-11 DIAGNOSIS — M79.651 ACUTE THIGH PAIN, RIGHT: ICD-10-CM

## 2022-11-11 DIAGNOSIS — I26.99 ACUTE MASSIVE PULMONARY EMBOLISM (HCC): ICD-10-CM

## 2022-11-11 LAB
CHP ED QC CHECK: NORMAL
GLUCOSE BLD-MCNC: 101 MG/DL
HBA1C MFR BLD: 5.2 %

## 2022-11-11 PROCEDURE — 83036 HEMOGLOBIN GLYCOSYLATED A1C: CPT | Performed by: INTERNAL MEDICINE

## 2022-11-11 PROCEDURE — 3074F SYST BP LT 130 MM HG: CPT | Performed by: INTERNAL MEDICINE

## 2022-11-11 PROCEDURE — 1123F ACP DISCUSS/DSCN MKR DOCD: CPT | Performed by: INTERNAL MEDICINE

## 2022-11-11 PROCEDURE — 3078F DIAST BP <80 MM HG: CPT | Performed by: INTERNAL MEDICINE

## 2022-11-11 PROCEDURE — 99214 OFFICE O/P EST MOD 30 MIN: CPT | Performed by: INTERNAL MEDICINE

## 2022-11-11 PROCEDURE — 82962 GLUCOSE BLOOD TEST: CPT | Performed by: INTERNAL MEDICINE

## 2022-11-11 RX ORDER — LOSARTAN POTASSIUM AND HYDROCHLOROTHIAZIDE 12.5; 5 MG/1; MG/1
1 TABLET ORAL DAILY
Qty: 90 TABLET | Refills: 0 | Status: SHIPPED | OUTPATIENT
Start: 2022-11-11

## 2022-11-11 NOTE — PROGRESS NOTES
SUBJECTIVE:  Jenn Jade is a 75 y.o. female HERE FOR   Chief Complaint   Patient presents with    Follow-up    Hypertension      PT HERE FOR EVAL        HTN - TAKING MED. ? DIET / EXERCISE COMPLIANCE. HEADACHE No, DIZZINESS No  PREDIABETES -  DIET / EXERCISE REVIEWED. LAB D/W PT  DYSLIPIDEMIA -  DIET / EXERCISE REVIEWED. LAB D/W PT. PULM EMBOLISM - ELIQUIS D/SARAI PER HEMATOLOGY. NO SOB . NO CP. FOLLOWING WITH HEMATOLOGY  RT THIGH PAIN -  INTERMITTENT, ? XTER, PAIN SCALE 5/10. NO SWELLING, DENIES TRAUMA  VIT D DEF - TAKING MED . LAB D/W PT.  TERRANCE - ON CPAP - TOLERATING  MORBID OBESITY - DIET / EXERCISE REVIEWED. WT NOTED  DVT - HENRI . S/P FILTER PLACEMENT. Loco Levasy D/SARAI       DENIES CP, No SOB, No PALPITATIONS, No COUGH, NO F/C  No ABD PAIN, No N/V, No DIARRHEA, No CONSTIPATION, No MELENA, No HEMATOCHEZIA. No DYSURIA, No FREQ, No URGENCY, No HEMATURIA      PMH: REVIEWED AND UPDATED TODAY    PSH: REVIEWED AND UPDATED TODAY    SOCIAL HX: REVIEWED AND UPDATED TODAY    FAMILY HX: REVIEWED AND UPDATED TODAY    ALLERGY:  Patient has no known allergies. MEDS: REVIEWED  Prior to Visit Medications    Medication Sig Taking? Authorizing Provider   losartan-hydroCHLOROthiazide (HYZAAR) 50-12.5 MG per tablet Take 1 tablet by mouth in the morning. Yes Tulio Arteaga MD   Cholecalciferol (VITAMIN D3) 25 MCG (1000 UT) CAPS Take 1 Can by mouth daily Yes Tulio Arteaga MD   Compression Stockings MISC by Does not apply route 3 pair. Put on in am and off at bed time.  20-30 mm Yes Aravind Schulte MD   furosemide (LASIX) 20 MG tablet Take 1 tablet by mouth daily as needed (SWELLING)  Tulio Arteaga MD   apixaban (ELIQUIS) 5 MG TABS tablet Take 5 mg by mouth 2 times daily  Historical Provider, MD   Multiple Vitamins-Minerals (BARIATRIC FUSION PO) Take 4 tablets by mouth daily  Historical Provider, MD        ROS: COMPREHENSIVE ROS AS IN HX, REST -VE  History obtained from chart review and the patient       OBJECTIVE: NURSING NOTE AND VITALS REVIEWED  /80 (Site: Left Upper Arm, Position: Sitting, Cuff Size: Large Adult)   Pulse 89   Wt 241 lb 12.8 oz (109.7 kg)   SpO2 98%   BMI 44.23 kg/m²     NO ACUTE DISTRESS    REPEAT BP: 120/72 (LT), NO ORTHOSTASIS    Body mass index is 44.23 kg/m². HEENT: NO PALLOR, ANICTERIC, PERRLA, EOMI, NO CONJUNCTIVAL ERYTHEMA,                 NO SINUS TENDERNESS  NECK:  SUPPLE, TRACHEA MIDLINE, NT, NO JVD, NO CB, NO LA, NO TM, NO STIFFNESS  CHEST: RESPY EFFORT NL, GOOD AE, NO W/R/C  HEART: S1S2+ REG, NO M/G/R  ABD: OBESE, SOFT, NT, NO HSM, BS+  EXT: + EDEMA, NT, PULSES +. LINDA'S -VE  NEURO: ALERT AND ORIENTED X 3, NO MENINGEAL SIGNS, NO TREMORS, AMBULATING WITH A LIMP, NO FOCAL DEFICITS  PSYCH: FAIRLY GOOD AFFECT  BACK: NT, NO ROM, NO CVA TENDERNESS  THIGH: NO LOCALIZED TENDERNESS      PREVIOUS LABS REVIEWED AND D/W PT    ACCUCHECK: 101    POC HBA1C: 5.2    ASSESSMENT / PLAN:     Diagnosis Orders   1. Primary hypertension  COUNSELLED. STABLE. CONTINUE MED. LOW NA+ / DASH DIET/ EXERCISE.   MONITOR. GOAL </= 130/80  MAKE CHANGES AS NEEDED. 2. Prediabetes  COUNSELLED. STABLE. ADVISED ON DIET / EXERCISE  ADVISED RISK FACTOR MODIFICATION. MONITOR  MAKE CHANGES AS NEEDED. 3. Dyslipidemia  COUNSELLED. ADVISED LOW FAT / CHOL DIET/ EXERCISE.  MONITOR. GOALS D/W PT.  MAKE CHANGES AS NEEDED. 4. Acute massive pulmonary embolism ([facility] [address]  COUNSELLED. MONITOR OF ELIQUIS  PT COUNSELLED ON SYMPTOMS AND ADVISED ON ACTIVITY. MAKE CHANGES AS NEEDED. 5. Acute thigh pain, right  COUNSELLED. ANALGESICS PRN. HOME EXERCISES  MAKE CHANGES AS NEEDED. 6. Vitamin D deficiency  COUNSELLED. MONITOR ON VIT D SUPPLEMENT. MAKE CHANGES AS NEEDED. 7. TERRANCE (obstructive sleep apnea)  COUNSELLED. CONTINUE C PAP - MONITOR. F/U SLEEP MED  MAKE CHANGES AS NEEDED. 8. Morbid obesity ([facility] [address]  COUNSELLED. DIET/ EXERCISE ADVISED. LIFESTYLE MODIFICATION.  WT LOSS ADVISED  MONITOR AND MAKE CHANGES AS NEEDED.                  PT DECLINED INFLUENZA VACCINE         MEDICATION SIDE EFFECTS D/W PATIENT    PT STABLE AT TIME OF D/C.    RETURN TO CLINIC WITHIN 4 WEEKS / PRN  NEEDS WELLNESS VISIT

## 2022-11-14 ENCOUNTER — ANESTHESIA EVENT (OUTPATIENT)
Dept: ENDOSCOPY | Age: 67
End: 2022-11-14
Payer: COMMERCIAL

## 2022-11-14 NOTE — PROGRESS NOTES
ENDOSCOPY PREOP INSTRUCTIONS      You are scheduled for a procedure at [facility] on 11-15 @ 1215. You will need to arrival by: 1045 (at least an hour & a half prior to planned start time)  Report to the MAIN entrance on [address] and register at the surgery center on the left-hand side of the lobby  You will need your insurance card and photo id    For your procedure:     PLEASE FOLLOW ALL INSTRUCTIONS & PREPS GIVEN TO YOU BY YOUR DOCTOR'S OFFICE. If you have not received these instructions yet, please call the office immediately. Make sure to read them as soon as received. Bring an accurate list of any medications, including the dose/ frequency, with you on the day of the procedure. Make sure to include over the counter medications. If you are taking blood thinners, Aspirin or diabetic medication, make sure to call your doctor as soon as possible for instructions prior to your procedure. Please dress comfortably and do not wear any lotion, powders or jewelry  Arrange for someone to be with you and sign you out & drive you home after your procedure. THIS PERSON MUST WAIT AT HOSPITAL THE ENTIRE TIME. We allow 2 adults visitors with you in the hospital & everyone is required to wear a mask.     WOMEN ONLY OF CHILDBEARING AGE: Please make sure to be able to give a urine sample on arrival      If you have further questions, you may contact your Endoscopist's office or Pre Admission Testing staff at [phone]

## 2022-11-15 ENCOUNTER — ANESTHESIA (OUTPATIENT)
Dept: ENDOSCOPY | Age: 67
End: 2022-11-15
Payer: COMMERCIAL

## 2022-11-15 ENCOUNTER — HOSPITAL ENCOUNTER (OUTPATIENT)
Age: 67
Setting detail: OUTPATIENT SURGERY
Discharge: HOME | End: 2022-11-15
Attending: INTERNAL MEDICINE | Admitting: INTERNAL MEDICINE
Payer: COMMERCIAL

## 2022-11-15 VITALS
TEMPERATURE: 96.7 F | BODY MASS INDEX: 43.24 KG/M2 | WEIGHT: 235 LBS | RESPIRATION RATE: 16 BRPM | HEIGHT: 62 IN | SYSTOLIC BLOOD PRESSURE: 112 MMHG | DIASTOLIC BLOOD PRESSURE: 69 MMHG | OXYGEN SATURATION: 100 % | HEART RATE: 77 BPM

## 2022-11-15 PROCEDURE — 7100000010 HC PHASE II RECOVERY - FIRST 15 MIN: Performed by: INTERNAL MEDICINE

## 2022-11-15 PROCEDURE — 3700000001 HC ADD 15 MINUTES (ANESTHESIA): Performed by: INTERNAL MEDICINE

## 2022-11-15 PROCEDURE — 2580000003 HC RX 258: Performed by: ANESTHESIOLOGY

## 2022-11-15 PROCEDURE — 6360000002 HC RX W HCPCS: Performed by: ANESTHESIOLOGY

## 2022-11-15 PROCEDURE — 7100000011 HC PHASE II RECOVERY - ADDTL 15 MIN: Performed by: INTERNAL MEDICINE

## 2022-11-15 PROCEDURE — 3700000000 HC ANESTHESIA ATTENDED CARE: Performed by: INTERNAL MEDICINE

## 2022-11-15 PROCEDURE — 2709999900 HC NON-CHARGEABLE SUPPLY: Performed by: INTERNAL MEDICINE

## 2022-11-15 PROCEDURE — 2500000003 HC RX 250 WO HCPCS: Performed by: INTERNAL MEDICINE

## 2022-11-15 PROCEDURE — 3609027000 HC COLONOSCOPY: Performed by: INTERNAL MEDICINE

## 2022-11-15 RX ORDER — SODIUM CHLORIDE 0.9 % (FLUSH) 0.9 %
5-40 SYRINGE (ML) INJECTION EVERY 12 HOURS SCHEDULED
Status: DISCONTINUED | OUTPATIENT
Start: 2022-11-15 | End: 2022-11-15 | Stop reason: HOSPADM

## 2022-11-15 RX ORDER — PROPOFOL 10 MG/ML
INJECTION, EMULSION INTRAVENOUS PRN
Status: DISCONTINUED | OUTPATIENT
Start: 2022-11-15 | End: 2022-11-15 | Stop reason: SDUPTHER

## 2022-11-15 RX ORDER — SODIUM CHLORIDE 0.9 % (FLUSH) 0.9 %
5-40 SYRINGE (ML) INJECTION PRN
Status: DISCONTINUED | OUTPATIENT
Start: 2022-11-15 | End: 2022-11-15 | Stop reason: HOSPADM

## 2022-11-15 RX ORDER — SODIUM CHLORIDE, SODIUM LACTATE, POTASSIUM CHLORIDE, CALCIUM CHLORIDE 600; 310; 30; 20 MG/100ML; MG/100ML; MG/100ML; MG/100ML
INJECTION, SOLUTION INTRAVENOUS CONTINUOUS
Status: DISCONTINUED | OUTPATIENT
Start: 2022-11-15 | End: 2022-11-15 | Stop reason: HOSPADM

## 2022-11-15 RX ORDER — SODIUM CHLORIDE 9 MG/ML
INJECTION, SOLUTION INTRAVENOUS PRN
Status: DISCONTINUED | OUTPATIENT
Start: 2022-11-15 | End: 2022-11-15 | Stop reason: HOSPADM

## 2022-11-15 RX ADMIN — PROPOFOL 50 MG: 10 INJECTION, EMULSION INTRAVENOUS at 12:34

## 2022-11-15 RX ADMIN — PROPOFOL 100 MG: 10 INJECTION, EMULSION INTRAVENOUS at 12:30

## 2022-11-15 RX ADMIN — PROPOFOL 50 MG: 10 INJECTION, EMULSION INTRAVENOUS at 12:38

## 2022-11-15 RX ADMIN — PROPOFOL 50 MG: 10 INJECTION, EMULSION INTRAVENOUS at 12:41

## 2022-11-15 RX ADMIN — PROPOFOL 50 MG: 10 INJECTION, EMULSION INTRAVENOUS at 12:47

## 2022-11-15 RX ADMIN — SODIUM CHLORIDE, POTASSIUM CHLORIDE, SODIUM LACTATE AND CALCIUM CHLORIDE: 600; 310; 30; 20 INJECTION, SOLUTION INTRAVENOUS at 11:39

## 2022-11-15 RX ADMIN — PROPOFOL 50 MG: 10 INJECTION, EMULSION INTRAVENOUS at 12:44

## 2022-11-15 ASSESSMENT — PAIN - FUNCTIONAL ASSESSMENT: PAIN_FUNCTIONAL_ASSESSMENT: 0-10

## 2022-11-15 NOTE — H&P
History and Physical / Pre-Sedation Assessment    Patient:  Nick Puckett   :   1955     Intended Procedure:  colonoscopy      HPI: colon cancer screening. H/o polyps    Past Medical History:   has a past medical history of Arthritis, Back pain, Fatty liver, Hx of blood clots, Hypertension, Obesity, and Sleep apnea. Past Surgical History:   has a past surgical history that includes Tonsillectomy; Upper gastrointestinal endoscopy (N/A, 2018); Upper gastrointestinal endoscopy (N/A, 2018); Colonoscopy (2018); Sleeve Gastrectomy (N/A, 2019); and IR GUIDED IVC FILTER PLACEMENT (10/04/2021). Medications:  Prior to Admission medications    Medication Sig Start Date End Date Taking? Authorizing Provider   losartan-hydroCHLOROthiazide [facility]) 50-12.5 MG per tablet Take 1 tablet by mouth daily 22   Damien Weaver MD   Cholecalciferol (VITAMIN D3) 25 MCG (1000 UT) CAPS Take 1 Can by mouth daily 22   Damien Weaver MD   furosemide (LASIX) 20 MG tablet Take 1 tablet by mouth daily as needed (SWELLING)  Patient not taking: Reported on 11/15/2022 8/11/22   Damien Weaver MD   Compression Stockings MISC by Does not apply route 3 pair. Put on in am and off at bed time. 20-30 mm 18   C Nichole Delacruz MD       Family History:  family history includes Anxiety Disorder in her mother; Arthritis in her mother; Cancer in her brother; Heart Disease in her brother; High Blood Pressure in her brother and mother. Social History:   reports that she has never smoked. She has never used smokeless tobacco. She reports that she does not drink alcohol and does not use drugs. Allergies:  Patient has no known allergies. ROS:  twelve point system review was unremarkable except for above noted history. Nurses notes reviewed and agreed.   Medications reviewed    Physical Exam:  Vital Signs: /62   Pulse 76   Temp 97.7 °F (36.5 °C) (Temporal)   Resp 17   Ht 5' 2\" (1.575 m)   Wt 235 lb (106.6 kg)   SpO2 100%   BMI 42.98 kg/m²    Skin: normal  HEENT: normal  Neck: supple. No adenopathy. No thyromegaly. No JVD. Pulmonary:Normal  Cardiac:Normal  Abdomen:Normal  MS: normal  Neuro: normal  Ext: no edema. Pulses normal    Pre-Procedure Assessment / Plan:  ASA 2 - Patient with mild systemic disease with no functional limitations  Mallampati Airway Assessment:  Mallampati Class II - (soft palate, fauces & uvula are visible)  Level of Sedation Plan: Moderate sedation  Post Procedure plan: Return to same level of care    I assessed the patient and find that the patient is in satisfactory condition to proceed with the planned procedure and sedation plan. I have explained the risk, benefits, and alternatives to the procedure. The patient understands and agrees to proceed.   Yes    Saige Pan MD  12:25 PM 11/15/2022

## 2022-11-15 NOTE — ANESTHESIA PRE PROCEDURE
Department of Anesthesiology  Preprocedure Note       Name:  Sabino Ramirez   Age:  75 y.o.  :  1955                                          MRN:  [id number]         Date:  11/15/2022      Surgeon: Mark Euceda):  Alber Hoang MD    Procedure: Procedure(s):  COLONOSCOPY    Medications prior to admission:   Prior to Admission medications    Medication Sig Start Date End Date Taking? Authorizing Provider   losartan-hydroCHLOROthiazide [facility]) 50-12.5 MG per tablet Take 1 tablet by mouth daily 22   Radha Quintana MD   Cholecalciferol (VITAMIN D3) 25 MCG (1000 UT) CAPS Take 1 Can by mouth daily 22   Radha Quintana MD   furosemide (LASIX) 20 MG tablet Take 1 tablet by mouth daily as needed (SWELLING)  Patient not taking: Reported on 11/15/2022 8/11/22   Radha Quintana MD   Compression Stockings MISC by Does not apply route 3 pair. Put on in am and off at bed time.  20-30 mm 18   C Ama Schneider MD       Current medications:    Current Facility-Administered Medications   Medication Dose Route Frequency Provider Last Rate Last Admin    lactated ringers infusion   IntraVENous Continuous Onalee Pronto,  mL/hr at 11/15/22 1139 New Bag at 11/15/22 1139    sodium chloride flush 0.9 % injection 5-40 mL  5-40 mL IntraVENous 2 times per day Onalee Pronto, DO        sodium chloride flush 0.9 % injection 5-40 mL  5-40 mL IntraVENous PRN Onalee Pronto, DO        0.9 % sodium chloride infusion   IntraVENous PRN Onalee Pronto, DO           Allergies:  No Known Allergies    Problem List:    Patient Active Problem List   Diagnosis Code    Chronic pain of left knee M25.562, G89.29    Left ankle swelling M25.472    Class 2 obesity with alveolar hypoventilation without serious comorbidity with body mass index (BMI) of 37.0 to 37.9 in adult (HCC) E66.2, Z68.37    Fatigue R53.83    Fatty liver disease, nonalcoholic X47.9    Essential hypertension I10    Hiatal hernia with GERD K44.9, K21.9    Idiopathic esophageal varices without bleeding (HCC) I85.00    Gastric polyp K31.7    Obstructive sleep apnea (adult) (pediatric) G47.33    Chest pain of uncertain etiology S01.1    Hypoxia R09.02    Abnormal EKG R94.31    Chest pain R07.9    Acute pulmonary embolism (HCC) I26.99    Acute deep vein thrombosis (DVT) of femoral vein (HCC) I82.419    Chronic systolic (congestive) heart failure I50.22       Past Medical History:        Diagnosis Date    Arthritis     Back pain     Fatty liver     Hx of blood clots 2021    Hypertension     Obesity     Sleep apnea     wears CPAP       Past Surgical History:        Procedure Laterality Date    COLONOSCOPY  08/17/2018    COLONOSCOPY POLYPECTOMY SNARE/COLD BIOPSY TRANSVERSE POLYP performed by Ana Maria Sanchez MD at [facility] IR IVC FILTER PLACEMENT W IMAGING  10/04/2021    IR IVC FILTER PLACEMENT W IMAGING 10/4/2021 [address]    SLEEVE GASTRECTOMY N/A 02/12/2019    ROBOTIC ASSISTED LAPAROSCOPIC SLEEVE GASTRECTOMY, LAPAROSCOPIC HIATAL HERNIA REPAIR performed by Julian Motley DO at [address] [facility]] N/A 08/17/2018    EGD BIOPSY GASTRIC ANTRUM BX R/O HP BX/COLD SNARE GASATRIC POLYP performed by Julian Motley DO at [address] [place] [address] N/A 08/17/2018    EGD POLYP SNARE performed by Julian Motley DO at [address] History:    Social History     Tobacco Use    Smoking status: Never    Smokeless tobacco: Never   Substance Use Topics    Alcohol use: No     Alcohol/week: 0.0 standard drinks                                Counseling given: Not Answered      Vital Signs (Current):   Vitals:    11/15/22 1052   BP: 127/62   Pulse: 76   Resp: 17   Temp: 97.7 °F (36.5 °C)   TempSrc: Temporal   SpO2: 100%   Weight: 235 lb (106.6 kg)   Height: 5' 2\" (1.575 m)                                              BP Readings from Last 3 Encounters:   11/15/22 127/62   11/11/22 120/72   08/11/22 118/80       NPO Status: Time of last liquid consumption: 2100                        Time of last solid consumption: 2000                        Date of last liquid consumption: 11/14/22                        Date of last solid food consumption: 11/13/22    BMI:   Wt Readings from Last 3 Encounters:   11/15/22 235 lb (106.6 kg)   11/11/22 241 lb 12.8 oz (109.7 kg)   08/11/22 238 lb (108 kg)     Body mass index is 42.98 kg/m². CBC:   Lab Results   Component Value Date/Time    WBC 5.1 02/28/2022 02:19 PM    RBC 4.08 02/28/2022 02:19 PM    HGB 12.1 02/28/2022 02:19 PM    HCT 37.7 02/28/2022 02:19 PM    MCV 92.4 02/28/2022 02:19 PM    RDW 15.6 02/28/2022 02:19 PM     02/28/2022 02:19 PM       CMP:   Lab Results   Component Value Date/Time     11/04/2022 12:17 PM    K 4.1 11/04/2022 12:17 PM    K 3.5 09/30/2021 05:33 PM     11/04/2022 12:17 PM    CO2 25 11/04/2022 12:17 PM    BUN 20 11/04/2022 12:17 PM    CREATININE 0.7 11/04/2022 12:17 PM    GFRAA >60 02/28/2022 02:19 PM    AGRATIO 1.4 02/28/2022 02:19 PM    LABGLOM >60 11/04/2022 12:17 PM    GLUCOSE 101 11/11/2022 11:28 AM    PROT 6.9 02/28/2022 02:19 PM    CALCIUM 9.6 11/04/2022 12:17 PM    BILITOT 0.8 02/28/2022 02:19 PM    ALKPHOS 82 02/28/2022 02:19 PM    AST 30 02/28/2022 02:19 PM    ALT 17 02/28/2022 02:19 PM       POC Tests: No results for input(s): POCGLU, POCNA, POCK, POCCL, POCBUN, POCHEMO, POCHCT in the last 72 hours.     Coags:   Lab Results   Component Value Date/Time    PROTIME 12.4 10/04/2021 12:26 PM    INR 1.09 10/04/2021 12:26 PM    APTT 74.6 10/01/2021 11:05 AM       HCG (If Applicable): No results found for: PREGTESTUR, PREGSERUM, HCG, HCGQUANT     ABGs: No results found for: PHART, PO2ART, ATM9CKP, QML2IDW, BEART, U7KYNMFC     Type & Screen (If Applicable):  No results found for: LABABO, LABRH    Drug/Infectious Status (If Applicable):  No results found for: HIV, HEPCAB    COVID-19 Screening (If Applicable):   Lab Results   Component Value Date/Time    COVID19 Not Detected 09/30/2021 07:43 PM           Anesthesia Evaluation  Patient summary reviewed and Nursing notes reviewed no history of anesthetic complications:   Airway: Mallampati: III  TM distance: >3 FB     Mouth opening: > = 3 FB   Dental:      Comment: No chipped or loose teeth    Pulmonary:   (+) sleep apnea:                             Cardiovascular:    (+) hypertension:, CHF:,         Rhythm: regular  Rate: normal                    Neuro/Psych:               GI/Hepatic/Renal:   (+) GERD:, morbid obesity          Endo/Other:                     Abdominal:             Vascular: Other Findings:           Anesthesia Plan      MAC     ASA 3       Induction: intravenous. Anesthetic plan and risks discussed with patient. Plan discussed with CRNA.                     Ye Bridges DO   11/15/2022

## 2022-11-15 NOTE — DISCHARGE INSTRUCTIONS
ENDOSCOPY DISCHARGE INSTRUCTIONS:    Call the physician that did your procedure for any questions or concerns:           DR. Cruz [facility]:  [phone]               ACTIVITY:    There are potential side effects from the medications used for sedation and anesthesia during your procedure. These include:  Dizziness or light-headedness, confusion or memory loss, delayed reaction times, loss of coordination, nausea and vomiting. Because of your increased risk for injury, we ask that you observe the following precautions: For the next 24 hours,  DO NOT operate an automobile, bicycle, motorcycle, , power tools or large equipment of any kind. Do not drink alcohol, sign any legal documents or make any legal decisions for 24 hours. Do not bend your head over lower than your heart. DO sit on the side of bed/couch awhile before getting up. Plan on bedrest or quiet relaxation today. You may resume normal activities in 24 hours. DIET:    Your first meal today should be light, avoiding spicy and fatty foods. If you tolerate this first meal, then you may advance to your regular diet unless otherwise advised by your physician. NORMAL SYMPTOMS:  -Mild sore throat if youve had an EGD   -Gaseous discomfort if you've had an EGD or Colonoscopy. NOTIFY YOUR PHYSICIAN IF THESE SYMPTOMS OCCUR:  1. Fever (greater than 100)  5. Increased abdominal bloating  2. Severe pain    6. Excessive bleeding  3. Nausea and vomiting  7. Chest pain                                                                    4. Chills    8. Shortness of breath      ADDITIONAL INSTRUCTIONS:    Biopsy results: To be discussed at your follow-up visit. Educational Information:    Follow up: Schedule an appointment with Dr. René Castillo for two weeks from now if you have not already done so. Please review these discharge instructions this evening or tomorrow for  information you may have forgotten.             We want to thank you for choosing the [facility] as your health care provider. We always strive to provide you with excellent care while you are here. You may receive a survey in the mail regarding your care. We would appreciate you taking a few minutes of your time to complete this survey.  Again, thank you for choosing the [facility]

## 2022-11-15 NOTE — PROGRESS NOTES
Ambulatory Surgery/Procedure Discharge Note    Vitals:    11/15/22 1256   BP: (!) 99/57   Pulse: 80   Resp: 15   Temp: (!) 96.7 °F (35.9 °C)   SpO2: 99%   Pt meets discharge criteria per Ilene score. In: 300 [I.V.:300]  Out: -     Restroom use offered before discharge. Yes    Pain assessment:  none  Pain Level: 0    Pt and S.O./family states \"ready to go home\". Pt alert and oriented x4. IV removed. Denies N/V or pain. Discharge instructions given to pt and sister in law with pt permission. Pt and sister in law verbalized understanding of all instructions. Left with all belongings and discharge instructions. Patient discharged to home/self care.  Patient discharged via wheel chair by transporter to waiting family/S.O.       11/15/2022 1:05 PM

## 2022-11-15 NOTE — ANESTHESIA POSTPROCEDURE EVALUATION
Department of Anesthesiology  Postprocedure Note    Patient: Arielle Tena  MRN: [id number]  YOB: 1955  Date of evaluation: 11/15/2022      Procedure Summary     Date: 11/15/22 Room / Location: [address] [place] [address] Basia Davis  / [facility]    Anesthesia Start: 1861 Anesthesia Stop: 1252    Procedure: COLONOSCOPY Diagnosis:       Colon cancer screening      (Colon cancer screening [Z12.11])    Surgeons: Lazarus Torres MD Responsible Provider: Lachelle Brown DO    Anesthesia Type: MAC ASA Status: 3          Anesthesia Type: No value filed.     Ilene Phase I: Ilene Score: 10    Ilene Phase II: Ilene Score: 10      Anesthesia Post Evaluation    Patient location during evaluation: PACU  Patient participation: complete - patient participated  Level of consciousness: awake and alert  Airway patency: patent  Nausea & Vomiting: no nausea and no vomiting  Cardiovascular status: blood pressure returned to baseline  Respiratory status: acceptable  Hydration status: euvolemic

## 2022-11-16 NOTE — OP NOTE
[address]               [address], [address]                                OPERATIVE REPORT    PATIENT NAME: Shameka Waterman                    :        1955  MED REC NO:   [id number]                          ROOM:  ACCOUNT NO:   [de-identified]                           ADMIT DATE: 11/15/2022  PROVIDER:     Shabnam Davis MD    DATE OF PROCEDURE:  11/15/2022    SURGEON:  Shabnam Davis MD    INDICATIONS FOR PROCEDURE:  Colorectal cancer screening, remote history  of polyp. OPERATIVE PROCEDURE:   With the patient in the left lateral position and  after IV Diprivan, the Olympus video colonoscope was inserted into the  rectum and carefully advanced to the cecum. Careful inspection did not  show any sign of carcinoma, polyp, inflammatory bowel, or  diverticulosis. The scope was then removed without complications. IMPRESSION:  Normal colonoscopy to cecum. ESTIMATED BLOOD LOSS:  None. Thank you Dr. Cyn Navarro for your kind referral of the patient.         Horace Ferrer MD    D: 11/15/2022 13:18:48       T: 11/15/2022 23:01:51     [email or web address]  Job#: [id number]     Doc#: [id number]    CC:  Mamadou Antonio MD

## 2022-12-06 ENCOUNTER — TELEMEDICINE (OUTPATIENT)
Dept: PULMONOLOGY | Age: 67
End: 2022-12-06
Payer: COMMERCIAL

## 2022-12-06 DIAGNOSIS — E66.2 CLASS 2 OBESITY WITH ALVEOLAR HYPOVENTILATION WITHOUT SERIOUS COMORBIDITY WITH BODY MASS INDEX (BMI) OF 37.0 TO 37.9 IN ADULT (HCC): ICD-10-CM

## 2022-12-06 DIAGNOSIS — I50.22 CHRONIC SYSTOLIC (CONGESTIVE) HEART FAILURE (HCC): ICD-10-CM

## 2022-12-06 DIAGNOSIS — I10 ESSENTIAL HYPERTENSION: ICD-10-CM

## 2022-12-06 DIAGNOSIS — G47.33 OBSTRUCTIVE SLEEP APNEA (ADULT) (PEDIATRIC): Primary | ICD-10-CM

## 2022-12-06 PROCEDURE — 1123F ACP DISCUSS/DSCN MKR DOCD: CPT | Performed by: NURSE PRACTITIONER

## 2022-12-06 PROCEDURE — 99214 OFFICE O/P EST MOD 30 MIN: CPT | Performed by: NURSE PRACTITIONER

## 2022-12-06 SDOH — HEALTH STABILITY: PHYSICAL HEALTH: ON AVERAGE, HOW MANY DAYS PER WEEK DO YOU ENGAGE IN MODERATE TO STRENUOUS EXERCISE (LIKE A BRISK WALK)?: 3 DAYS

## 2022-12-06 ASSESSMENT — LIFESTYLE VARIABLES
HOW OFTEN DO YOU HAVE A DRINK CONTAINING ALCOHOL: NEVER
HOW OFTEN DO YOU HAVE SIX OR MORE DRINKS ON ONE OCCASION: 1
HOW OFTEN DO YOU HAVE A DRINK CONTAINING ALCOHOL: 1
HOW MANY STANDARD DRINKS CONTAINING ALCOHOL DO YOU HAVE ON A TYPICAL DAY: PATIENT DOES NOT DRINK
HOW MANY STANDARD DRINKS CONTAINING ALCOHOL DO YOU HAVE ON A TYPICAL DAY: 0

## 2022-12-06 ASSESSMENT — PATIENT HEALTH QUESTIONNAIRE - PHQ9
SUM OF ALL RESPONSES TO PHQ QUESTIONS 1-9: 0
1. LITTLE INTEREST OR PLEASURE IN DOING THINGS: 0
SUM OF ALL RESPONSES TO PHQ9 QUESTIONS 1 & 2: 0
2. FEELING DOWN, DEPRESSED OR HOPELESS: 0
SUM OF ALL RESPONSES TO PHQ QUESTIONS 1-9: 0

## 2022-12-06 ASSESSMENT — SLEEP AND FATIGUE QUESTIONNAIRES
HOW LIKELY ARE YOU TO NOD OFF OR FALL ASLEEP WHILE SITTING AND READING: 2
HOW LIKELY ARE YOU TO NOD OFF OR FALL ASLEEP WHILE SITTING AND TALKING TO SOMEONE: 0
HOW LIKELY ARE YOU TO NOD OFF OR FALL ASLEEP WHILE SITTING QUIETLY AFTER LUNCH WITHOUT ALCOHOL: 1
HOW LIKELY ARE YOU TO NOD OFF OR FALL ASLEEP WHILE LYING DOWN TO REST IN THE AFTERNOON WHEN CIRCUMSTANCES PERMIT: 1
HOW LIKELY ARE YOU TO NOD OFF OR FALL ASLEEP WHILE SITTING INACTIVE IN A PUBLIC PLACE: 0
HOW LIKELY ARE YOU TO NOD OFF OR FALL ASLEEP IN A CAR, WHILE STOPPED FOR A FEW MINUTES IN TRAFFIC: 0
HOW LIKELY ARE YOU TO NOD OFF OR FALL ASLEEP WHILE WATCHING TV: 2
HOW LIKELY ARE YOU TO NOD OFF OR FALL ASLEEP WHEN YOU ARE A PASSENGER IN A CAR FOR AN HOUR WITHOUT A BREAK: 0
ESS TOTAL SCORE: 6

## 2022-12-06 NOTE — LETTER
[facility]  [id number] [facility]  [address] [address]  Phone: [phone]  Fax: [phone]    December 6, 2022       Patient: Sebastian Villarreal   MR Number: [id number]   YOB: 1955   Date of Visit: 12/6/2022       Leila Knox was seen for a follow up visit today. Here is my assessment and plan as well as an attached copy of her visit today:    Chronic systolic (congestive) heart failure   Chronic- Stable. Discussed the importance of treating obstructive sleep apnea as part of the management of this disorder. Cont any meds per PCP and other physicians. Essential hypertension  Chronic- Stable. Discussed the importance of treating obstructive sleep apnea as part of the management of this disorder. Cont any meds per PCP and other physicians. Class 2 obesity with alveolar hypoventilation without serious comorbidity with body mass index (BMI) of 37.0 to 37.9 in adult [facility])   Chronic-not stable:  Discussed importance of treating obstructive sleep apnea and getting sufficient sleep to assist with weight control. Encouraged her to work on weight loss through diet and exercise. Recommended DASH or Mediterranean diets. Obstructive sleep apnea (adult) (pediatric)   Chronic-Stable: Reviewed and analyzed results of physiologic download from patient's machine and reviewed with patient. Supplies and parts as needed for her machine. These are medically necessary. Limit caffeine use after 3pm. Based on the analyzed data will continue with current settings. Stable on her machine at current settings, getting benefit from the use, and having minimal side effects. Will see her back in 1 year. Encouraged to contact the office any questions or concerns. If you have questions or concerns, please do not hesitate to call me. I look forward to following [employer] along with you.     Sincerely,    MERNA Magallanes    CC providers:  Bia Davidson MD  [address]

## 2022-12-06 NOTE — PROGRESS NOTES
[facility] [address], [address] [facility] [phone]   [facility] Nayeli Quintero. [address] [place] [address]. Cate Franco [address] [phone]     Video Visit- Follow up    Tae May, was evaluated through a synchronous (real-time) audio-video  encounter. The patient (or guardian if applicable) is aware that this is a billable  service, which includes applicable co-pays. This Virtual Visit was conducted with  patient's (and/or legal guardian's) consent. The visit was conducted pursuant to  the emergency declaration under the [address] [place] [address] [facility] authority and the [employer] and  [employer] General Act. Patient identification was verified,  and a caregiver was present when appropriate. The patient was located in a  state where the provider was licensed to provide care. Assessment/Plan:      1. Obstructive sleep apnea (adult) (pediatric)  Assessment & Plan:   Chronic-Stable: Reviewed and analyzed results of physiologic download from patient's machine and reviewed with patient. Supplies and parts as needed for her machine. These are medically necessary. Limit caffeine use after 3pm. Based on the analyzed data will continue with current settings. Stable on her machine at current settings, getting benefit from the use, and having minimal side effects. Will see her back in 1 year. Encouraged to contact the office any questions or concerns. 2. Chronic systolic (congestive) heart failure  Assessment & Plan:   Chronic- Stable. Discussed the importance of treating obstructive sleep apnea as part of the management of this disorder. Cont any meds per PCP and other physicians. 3. Essential hypertension  Assessment & Plan:  Chronic- Stable. Discussed the importance of treating obstructive sleep apnea as part of the management of this disorder. Cont any meds per PCP and other physicians.     4. Class 2 obesity with alveolar hypoventilation without serious comorbidity with body mass index (BMI) of 37.0 to 37.9 in adult [facility])  Assessment & Plan:   Chronic-not stable:  Discussed importance of treating obstructive sleep apnea and getting sufficient sleep to assist with weight control. Encouraged her to work on weight loss through diet and exercise. Recommended DASH or Mediterranean diets. Reviewed, analyzed, and documented physiologic data from patient's PAP machine. This information was analyzed to assess complexity and medical decision making in regards to further testing and management. The primary encounter diagnosis was Obstructive sleep apnea (adult) (pediatric). Diagnoses of Chronic systolic (congestive) heart failure, Essential hypertension, and Class 2 obesity with alveolar hypoventilation without serious comorbidity with body mass index (BMI) of 37.0 to 37.9 in adult [facility]) were also pertinent to this visit. The chronic medical conditions listed are directly related to the primary diagnosis listed above. The management of the primary diagnosis affects the secondary diagnosis and vice versa. Subjective:     Patient ID: Helen Kilgore is a 75 y.o. female. Chief Complaint   Patient presents with    Sleep Apnea     Subjective   HPI:    Machine Modem/Download Info:  Compliance (hours/night): 8.5 hrs/night  % of nights >= 4 hrs: 98.9 %  Download AHI (/hour): 0.7 /HR  Average CPAP Pressure : 10.6 cmH2O      APAP - Settings  Pressure Min: 7 cmH2O  Pressure Max: 17 cmH2O                 Comfort Settings  Humidity Level (0-8): 5  Flex/EPR (0-3): 3 PAP Mask  Mask Type: Nasal mask     Helen Kilgore reports she is doing well with her machine. She has received her replacement machine for the  recall. The pressure on her machine is comfortable and she is waking rested. she denies headaches, congestion, nosebleeds, dryness, aerophagia, or drowsiness while driving.   Her mask is comfortable and is fitting well. [address]    [place] Sleepiness Score: 6    Current Outpatient Medications   Medication Instructions    Cholecalciferol (VITAMIN D3) 25 MCG (1000 UT) CAPS 1 Can, Oral, DAILY    Compression Stockings MISC Does not apply, 3 pair. Put on in am and off at bed time.  20-30 mm    furosemide (LASIX) 20 mg, Oral, DAILY PRN    losartan-hydroCHLOROthiazide (HYZAAR) 50-12.5 MG per tablet 1 tablet, Oral, DAILY        Electronically signed by MERNA Basurto on 12/6/2022 at 11:30 AM

## 2022-12-06 NOTE — PROGRESS NOTES
Diagnosis: [x] TERRANCE (G47.33) [] CSA (G47.31) [] Apnea (G47.30)   Length of Need: [x] 15 Months [] 99 Months [] Other:   Machine (COLLIN!): [] Respironics Dream Station      Auto [] ResMed AirSense     Auto [] Other:     []  CPAP () [] Bilevel ()   Mode: [] Auto [] Spontaneous    Mode: [] Auto [] Spontaneous            Comfort Settings:      Humidifier: [] Heated ()        [x] Water chamber replacement ()/ 1 per 6 months        Mask:   [x] Nasal () /1 per 3 months [] Full Face () /1 per 3 months   [x] Patient choice -Size and fit mask [] Patient Choice - Size and fit mask   [] Dispense: [] Dispense:   [x] Headgear () / 1 per 3 months [] Headgear () / 1 per 3 months   [x] Replacement Nasal Cushion ()/2 per month [] Interface Replacement ()/1 per month   [] Replacement Nasal Pillows ()/2 per month         Tubing: [x] Heated ()/1 per 3 months    [] Standard ()/1 per 3 months [] Other:           Filters: [x] Non-disposable ()/1 per 6 months     [x] Ultra-Fine, Disposable ()/2 per month        Miscellaneous: [] Chin Strap ()/ 1 per 6 months [] O2 bleed-in:        LPM   [] Oxymetry on CPAP/Bilevel []  Other:         Start Order Date: 12/06/22    MEDICAL JUSTIFICATION:  I, the undersigned, certify that the above prescribed supplies are medically necessary for this patients wellbeing. In my opinion, the supplies are both reasonable and necessary in reference to accepted standards of medicalpractice in treatment of this patients condition. Lore Eubanks NP    NPI: [id number]       Order Signed Date: 12/06/22  [address] [facility]  Pulmonary, Sleep, and Critical Care    Pulmonary, Sleep, and Critical Care  [address] [place] [address].  [facility], [address] [facility] , [address] [facility]  Phone: [phone]    Fax: [address] [facility] Taylor Das  1955  [address] [address]  [phone] (home)   [phone] (mobile)      Insurance Info (confirm with patient if correct):  Payer/Plan Subscr  Sex Relation Sub.  Ins. ID Effective Group Num

## 2022-12-13 ENCOUNTER — OFFICE VISIT (OUTPATIENT)
Dept: INTERNAL MEDICINE CLINIC | Age: 67
End: 2022-12-13
Payer: COMMERCIAL

## 2022-12-13 VITALS
SYSTOLIC BLOOD PRESSURE: 118 MMHG | OXYGEN SATURATION: 95 % | BODY MASS INDEX: 43.79 KG/M2 | DIASTOLIC BLOOD PRESSURE: 70 MMHG | HEIGHT: 62 IN | HEART RATE: 78 BPM | WEIGHT: 238 LBS

## 2022-12-13 DIAGNOSIS — E78.5 DYSLIPIDEMIA: ICD-10-CM

## 2022-12-13 DIAGNOSIS — Z00.00 MEDICARE ANNUAL WELLNESS VISIT, SUBSEQUENT: Primary | ICD-10-CM

## 2022-12-13 DIAGNOSIS — R60.0 LOCALIZED EDEMA: ICD-10-CM

## 2022-12-13 DIAGNOSIS — M79.651 ACUTE THIGH PAIN, RIGHT: ICD-10-CM

## 2022-12-13 DIAGNOSIS — E66.01 MORBID OBESITY (HCC): ICD-10-CM

## 2022-12-13 DIAGNOSIS — E55.9 VITAMIN D DEFICIENCY: ICD-10-CM

## 2022-12-13 DIAGNOSIS — R73.03 PREDIABETES: ICD-10-CM

## 2022-12-13 DIAGNOSIS — H61.22 IMPACTED CERUMEN OF LEFT EAR: ICD-10-CM

## 2022-12-13 DIAGNOSIS — I10 PRIMARY HYPERTENSION: ICD-10-CM

## 2022-12-13 PROCEDURE — G0439 PPPS, SUBSEQ VISIT: HCPCS | Performed by: INTERNAL MEDICINE

## 2022-12-13 PROCEDURE — 1123F ACP DISCUSS/DSCN MKR DOCD: CPT | Performed by: INTERNAL MEDICINE

## 2022-12-13 PROCEDURE — 69210 REMOVE IMPACTED EAR WAX UNI: CPT | Performed by: INTERNAL MEDICINE

## 2022-12-13 PROCEDURE — 3074F SYST BP LT 130 MM HG: CPT | Performed by: INTERNAL MEDICINE

## 2022-12-13 PROCEDURE — 3078F DIAST BP <80 MM HG: CPT | Performed by: INTERNAL MEDICINE

## 2022-12-13 RX ORDER — LOSARTAN POTASSIUM AND HYDROCHLOROTHIAZIDE 12.5; 5 MG/1; MG/1
1 TABLET ORAL DAILY
Qty: 90 TABLET | Refills: 0 | Status: SHIPPED | OUTPATIENT
Start: 2022-12-13

## 2022-12-13 RX ORDER — FUROSEMIDE 20 MG/1
20 TABLET ORAL DAILY PRN
Qty: 30 TABLET | Refills: 0 | Status: SHIPPED | OUTPATIENT
Start: 2022-12-13

## 2022-12-13 SDOH — ECONOMIC STABILITY: FOOD INSECURITY: WITHIN THE PAST 12 MONTHS, THE FOOD YOU BOUGHT JUST DIDN'T LAST AND YOU DIDN'T HAVE MONEY TO GET MORE.: NEVER TRUE

## 2022-12-13 SDOH — ECONOMIC STABILITY: FOOD INSECURITY: WITHIN THE PAST 12 MONTHS, YOU WORRIED THAT YOUR FOOD WOULD RUN OUT BEFORE YOU GOT MONEY TO BUY MORE.: NEVER TRUE

## 2022-12-13 ASSESSMENT — SOCIAL DETERMINANTS OF HEALTH (SDOH): HOW HARD IS IT FOR YOU TO PAY FOR THE VERY BASICS LIKE FOOD, HOUSING, MEDICAL CARE, AND HEATING?: NOT HARD AT ALL

## 2022-12-13 NOTE — PROGRESS NOTES
Medicare Annual Wellness Visit    Jenn Jade is here for Follow-up and Medicare AWV    Recommendations for Preventive Services Due: see orders and patient instructions/AVS.  Recommended screening schedule for the next 5-10 years is provided to the patient in written form: see Patient Instructions/AVS.     Return in 4 months (on 4/13/2023) for Medicare Annual Wellness Visit in 1 year. Subjective     LAST PHYSICAL > 1 YR    The following acute and/or chronic problems were also addressed today:    HTN - TAKING MED. ? DIET / EXERCISE COMPLIANCE. HEADACHE No, DIZZINESS No  PREDIABETES -  DIET / EXERCISE REVIEWED. LAB D/W PT  DYSLIPIDEMIA -  DIET / EXERCISE REVIEWED. LAB D/W PT.  RT THIGH PAIN -  INTERMITTENT, ? XTER, IMPROVED. PAIN FREE AT THIS TIME. NO SWELLING, DENIES TRAUMA  VIT D DEF - TAKING MED . LAB D/W PT.  MORBID OBESITY - DIET / EXERCISE REVIEWED. WT NOTED        DENIES CP, No SOB, No PALPITATIONS, No COUGH, NO F/C  No ABD PAIN, No N/V, No DIARRHEA, No CONSTIPATION, No MELENA, No HEMATOCHEZIA. No DYSURIA, No FREQ, No URGENCY, No HEMATURIA      ROS: COMPREHENSIVE ROS AS IN HX, REST -VE  History obtained from chart review and the patient    Patient's complete Health Risk Assessment and screening values have been reviewed and are found in Flowsheets. The following problems were reviewed today and where indicated follow up appointments were made and/or referrals ordered.     Positive Risk Factor Screenings with Interventions:                 Weight and Activity:  Physical Activity: Unknown    Days of Exercise per Week: 3 days    Minutes of Exercise per Session: Not on file     On average, how many days per week do you engage in moderate to strenuous exercise (like a brisk walk)?: 3 days  Have you lost any weight without trying in the past 3 months?: No  Body mass index: (!) 43.53    Obesity Interventions:  ADVISED ON DIET / EXERCISE / WT LOSS  ADVISED O ADEQUATE EXERCISES  See AVS for additional education material  See A/P for plan and any pertinent orders        Dentist Screen:  Have you seen the dentist within the past year?: (!) No    Intervention:  ADVISED TO FOLLOW UP WITH DENTIST  See AVS for additional education material  See A/P for any pertinent orders                        Objective   Vitals:    12/13/22 1158   BP: 120/64   Site: Right Upper Arm   Position: Sitting   Cuff Size: Medium Adult   Pulse: 78   SpO2: 95%   Weight: 238 lb (108 kg)   Height: 5' 2\" (1.575 m)            No Known Allergies  Prior to Visit Medications    Medication Sig Taking? Authorizing Provider   losartan-hydroCHLOROthiazide (HYZAAR) 50-12.5 MG per tablet Take 1 tablet by mouth daily Yes Kelvin Alicia MD   Cholecalciferol (VITAMIN D3) 25 MCG (1000 UT) CAPS Take 1 Can by mouth daily Yes Kelvin Alicia MD   Compression Stockings MISC by Does not apply route 3 pair. Put on in am and off at bed time.  20-30 mm Yes Marck Barnes MD   furosemide (LASIX) 20 MG tablet Take 1 tablet by mouth daily as needed (SWELLING)  Patient not taking: No sig reported  Kelvin Alicia MD       CareTeam (Including outside providers/suppliers regularly involved in providing care):   Patient Care Team:  Kelvin Alicia MD as PCP - General (Internal Medicine)  Kelvin Alicia MD as PCP - [facility] Empaneled Provider  Gaurav Jamil MD as Consulting Physician (Sleep Medicine Family Practice)  MERNA Montero - CNP as Nurse Practitioner (Nurse Practitioner)  Sigifredo South MD as Consulting Physician (Hematology and Oncology)     Reviewed and updated this visit:  Tobacco  Allergies  Meds  Problems  Med Hx  Surg Hx  Soc Hx  Fam Hx            OBJECTIVE:   NURSING NOTE AND VITALS REVIEWED  /64 (Site: Right Upper Arm, Position: Sitting, Cuff Size: Medium Adult)   Pulse 78   Ht 5' 2\" (1.575 m)   Wt 238 lb (108 kg)   SpO2 95%   BMI 43.53 kg/m²     NO ACUTE DISTRESS    REPEAT BP: 118/70 (LT), NO ORTHOSTASIS     Body mass index is 43.53 kg/m². HEENT: NO PALLOR, ANICTERIC, PERRLA, EOMI, NO CONJUNCTIVAL ERYTHEMA,                 NO SINUS TENDERNESS, LT CERUMEN IMPACTION, NO TM ERYTHEMA - RT  NECK:  SUPPLE, TRACHEA MIDLINE, NT, NO JVD, NO CB, NO LA, NO TM, NO STIFFNESS  CHEST: RESPY EFFORT NL, GOOD AE, NO W/R/C  HEART: S1S2+ REG, NO M/G/R  ABD: OBESE, SOFT, NT, NO HSM, BS+  EXT: + EDEMA, NT, PULSES +. LINDA'S -VE  NEURO: ALERT AND ORIENTED X 3, NO MENINGEAL SIGNS, NO TREMORS, AMBULATING WITH A SLIGHT LIMP OTHERWISE, NO FOCAL DEFICITS  PSYCH: FAIRLY GOOD AFFECT. RECALL 3/3  BACK: NT, NO ROM, NO CVA TENDERNESS  THIGH: NT    PREVIOUS LABS REVIEWED AND D/W PT    ASSESSMENT / PLAN:     Diagnosis Orders   1. Medicare annual wellness visit, subsequent  COUNSELLED. HEALTH / SAFETY EDUCATION REVIEWED AND ADVISED  HEALTH MAINTENANCE UPDATED  IMMUNIZATION REVIEWED - READDRESS  ADVISED ON OVERALL HEALTH AND WELLNESS, DENTAL EXAM, ADEQUATE EXERCISE, DIET / WT LOSS  MAKE CHANGES AS NEEDED. 2. Primary hypertension  COUNSELLED. STABLE. CONTINUE MED. LOW NA+ / DASH DIET/ EXERCISE. MONITOR. GOAL </= 130/80  MONITOR FOR HYPOTENSION  MAKE CHANGES AS NEEDED. 3. Prediabetes  COUNSELLED. ADVISED ON DIET / EXERCISE  ADVISED RISK FACTOR MODIFICATION. MONITOR  MAKE CHANGES AS NEEDED. 4. Dyslipidemia  COUNSELLED. ADVISED LOW FAT / CHOL DIET/ EXERCISE.  MONITOR. GOALS D/W PT.  MAKE CHANGES AS NEEDED. 5. Acute thigh pain, right  COUNSELLED. EXERCISES. ANALGESICS PRN. MONITOR. MAKE CHANGES AS NEEDED. 6. Vitamin D deficiency  COUNSELLED. MONITOR ON VIT D SUPPLEMENT. MAKE CHANGES AS NEEDED. 7. Morbid obesity ([facility] [address]  COUNSELLED. DIET/ EXERCISE ADVISED. LIFESTYLE MODIFICATION. WT LOSS ADVISED. MONITOR AND MAKE CHANGES AS NEEDED. 8. Impacted cerumen of left ear  COUNSELLED. PA REMOVAL IMPACTED CERUMEN INSTRUMENTATION UNILAT - DONE PT TOLERATED. Ceruminosis is noted. Wax is removed manual debridement with ear currette. Instructions for home care to prevent wax buildup are given. NO RESIDUAL CERUMEN ON REEVAL- LT. NO TM ERYTHEMA - ON REEVAL. MAINTAIN HYGIENE. MONITOR. Instructions for home care to prevent wax buildup given  MAKE CHANGES AS NEEDED. 9. Localized edema  COUNSELLED. ADVISED LOW NA+ DIET. ELEVATE LEGS. COMPRESSION STOCKINGS. ADVISED ON LASIX PRN  MONITOR. MAKE CHANGES AS NEEDED.                        MEDICATION SIDE EFFECTS D/W PATIENT      RETURN TO CLINIC WITHIN 4 MONTHS / PRN

## 2022-12-13 NOTE — PATIENT INSTRUCTIONS
[facility] Laboratory Locations - No appointment necessary. @ indicates the location is open Saturdays in addition to Monday through Friday. Call your preferred location for test preparation, business hours and other information you need. [employer] accepts [employer]. [facility]    @ [place] [facility] S[facility]. [address] Georgette Burrell [facility] [address]   Ph: [phone] [facility] Svcs. [address]   Ph: [phone]  @ [facility] Svcs. [address] Jimmie Goldmann, [facility]   Ph: [phone]    [facility] Svcs. [address]   Ph: [phone] @ [place] [facility] S[facility]. [address]  Ph: [phone] @ [facility] Svcs. 835 [facility]. Francisco Burrell [address]   Ph: [phone]     Selvin Kumar Svcs. [place] Evgeny Burrell 19  Ph: [phone]    [place]   @ [place] [facility] Svcs. [address] [facility] Forbes.[facility] [address]   Ph: [phone] [place] [facility] [facility] [address] [facility] [address]  Ph: [address] [place] [phone], [phone]   [facility] 30:  [address] Lab Svcs. 54 [facility]   Ph: [phone] [facility]. Lab Svcs. [facility] [address]   Ph: [phone] [facility]  Dental care for older adults: Overview  Dental care for older people is much the same as for younger adults. But older adults do have concerns that younger adults do not. Older adults may have problems with gum disease and decay on the roots of their teeth. They may need missing teeth replaced or broken fillings fixed. Or they may have dentures that need to be cared for. Some older adults may have trouble holding a toothbrush. You can help remind the person you are caring for to brush and floss their teeth or to clean their dentures. In some cases, you may need to do the brushing and other dental care tasks. People who have trouble using their hands or who have dementia may need this extra help. How can you help with dental care? Normal dental care  To keep the teeth and gums healthy:  Brush the teeth with fluoride toothpaste twice a day--in the morning and at night--and floss at least once a day. Plaque can quickly build up on the teeth of older adults. Watch for the signs of gum disease. These signs include gums that bleed after brushing or after eating hard foods, such as apples. See a dentist regularly. Many experts recommend checkups every 6 months. Keep the dentist up to date on any new medications the person is taking. Encourage a balanced diet that includes whole grains, vegetables, and fruits, and that is low in saturated fat and sodium. Encourage the person you're caring for not to use tobacco products. They can affect dental and general health. Many older adults have a fixed income and feel that they can't afford dental care. But most [facility] and [facility] have programs in which dentists help older adults by lowering fees. Contact your area's public health offices or  for information about dental care in your area. Using a toothbrush  Older adults with arthritis sometimes have trouble brushing their teeth because they can't easily hold the toothbrush. Their hands and fingers may be stiff, painful, or weak. If this is the case, you can: Offer an electric toothbrush. Enlarge the handle of a non-electric toothbrush by wrapping a sponge, an elastic bandage, or adhesive tape around it. Push the toothbrush handle through a ball made of rubber or soft foam.  Make the handle longer and thicker by taping Popsicle sticks or tongue depressors to it. You may also be able to buy special toothbrushes, toothpaste dispensers, and floss holders.   Your doctor may recommend a soft-bristle toothbrush if the person you care for bleeds easily. Bleeding can happen because of a health problem or from certain medicines. A toothpaste for sensitive teeth may help if the person you care for has sensitive teeth. How do you brush and floss someone's teeth? If the person you are caring for has a hard time cleaning their teeth on their own, you may need to brush and floss their teeth for them. It may be easiest to have the person sit and face away from you, and to sit or stand behind them. That way you can steady their head against your arm as you reach around to floss and brush their teeth. Choose a place that has good lighting and is comfortable for both of you. Before you begin, gather your supplies. You will need gloves, floss, a toothbrush, and a container to hold water if you are not near a sink. Wash and dry your hands well and put on gloves. Start by flossing:  Gently work a piece of floss between each of the teeth toward the gums. A plastic flossing tool may make this easier, and they are available at most [facility]. Curve the floss around each tooth into a U-shape and gently slide it under the gum line. Move the floss firmly up and down several times to scrape off the plaque. After you've finished flossing, throw away the used floss and begin brushing:  Wet the brush and apply toothpaste. Place the brush at a 45-degree angle where the teeth meet the gums. Press firmly, and move the brush in small circles over the surface of the teeth. Be careful not to brush too hard. Vigorous brushing can make the gums pull away from the teeth and can scratch the tooth enamel. Brush all surfaces of the teeth, on the tongue side and on the cheek side. Pay special attention to the front teeth and all surfaces of the back teeth. Brush chewing surfaces with short back-and-forth strokes. After you've finished, help the person rinse the remaining toothpaste from their mouth. Where can you learn more?   Go to http://www.woods.com/ and enter F944 to learn more about \"Learning About Dental Care for Older Adults. \"  Current as of: June 16, 2022               Content Version: 13.5  © 2006-2022 [employer]. Care instructions adapted under license by [facility] ([facility]). If you have questions about a medical condition or this instruction, always ask your healthcare professional. [place] [address] any warranty or liability for your use of this information. Advance Directives: Care Instructions  Overview  An advance directive is a legal way to state your wishes at the end of your life. It tells your family and your doctor what to do if you can't say what you want. There are two main types of advance directives. You can change them any time your wishes change. Living will. This form tells your family and your doctor your wishes about life support and other treatment. The form is also called a declaration. Medical power of . This form lets you name a person to make treatment decisions for you when you can't speak for yourself. This person is called a health care agent (health care proxy, health care surrogate). The form is also called a durable power of  for health care. If you do not have an advance directive, decisions about your medical care may be made by a family member, or by a doctor or a  who doesn't know you. It may help to think of an advance directive as a gift to the people who care for you. If you have one, they won't have to make tough decisions by themselves. For more information, including forms for your state, see the [address] website (www.caringinfo.org/planning/advance-directives/). Follow-up care is a key part of your treatment and safety. Be sure to make and go to all appointments, and call your doctor if you are having problems. It's also a good idea to know your test results and keep a list of the medicines you take.   What should you include in an advance directive? Many states have a unique advance directive form. (It may ask you to address specific issues.) Or you might use a universal form that's approved by many states. If your form doesn't tell you what to address, it may be hard to know what to include in your advance directive. Use the questions below to help you get started. Who do you want to make decisions about your medical care if you are not able to? What life-support measures do you want if you have a serious illness that gets worse over time or can't be cured? What are you most afraid of that might happen? (Maybe you're afraid of having pain, losing your independence, or being kept alive by machines.)  Where would you prefer to die? (Your home? A hospital? A nursing home?)  Do you want to donate your organs when you die? Do you want certain Jainism practices performed before you die? When should you call for help? Be sure to contact your doctor if you have any questions. Where can you learn more? Go to http://www.strauss.com/ and enter R264 to learn more about \"Advance Directives: Care Instructions. \"  Current as of: June 16, 2022               Content Version: 13.5  © 0806-1315 [employer]. Care instructions adapted under license by [facility] ([facility]). If you have questions about a medical condition or this instruction, always ask your healthcare professional. [place] [address] any warranty or liability for your use of this information. Personalized Preventive Plan for Lila Blancas - 12/13/2022  Medicare offers a range of preventive health benefits. Some of the tests and screenings are paid in full while other may be subject to a deductible, co-insurance, and/or copay. Some of these benefits include a comprehensive review of your medical history including lifestyle, illnesses that may run in your family, and various assessments and screenings as appropriate.     After reviewing your medical record and screening and assessments performed today your provider may have ordered immunizations, labs, imaging, and/or referrals for you. A list of these orders (if applicable) as well as your Preventive Care list are included within your After Visit Summary for your review. Other Preventive Recommendations:    A preventive eye exam performed by an eye specialist is recommended every 1-2 years to screen for glaucoma; cataracts, macular degeneration, and other eye disorders. A preventive dental visit is recommended every 6 months. Try to get at least 150 minutes of exercise per week or 10,000 steps per day on a pedometer . Order or download the FREE \"Exercise & Physical Activity: Your Everyday Guide\" from [employer]. Call 9-271.624.4740 or search [employer] online. You need 3257-7958 mg of calcium and 9771-1874 IU of vitamin D per day. It is possible to meet your calcium requirement with diet alone, but a vitamin D supplement is usually necessary to meet this goal.  When exposed to the sun, use a sunscreen that protects against both UVA and UVB radiation with an SPF of 30 or greater. Reapply every 2 to 3 hours or after sweating, drying off with a towel, or swimming. Always wear a seat belt when traveling in a car. Always wear a helmet when riding a bicycle or motorcycle. TAKE MED AS ADVISED    DIET/ EXERCISE.     FOLLOW UP WITHIN 4 MONTHS / AS NEEDED

## 2023-04-03 DIAGNOSIS — I10 PRIMARY HYPERTENSION: ICD-10-CM

## 2023-04-03 RX ORDER — LOSARTAN POTASSIUM AND HYDROCHLOROTHIAZIDE 12.5; 5 MG/1; MG/1
1 TABLET ORAL DAILY
Qty: 90 TABLET | Refills: 0 | Status: CANCELLED | OUTPATIENT
Start: 2023-04-03

## 2023-07-05 DIAGNOSIS — I10 PRIMARY HYPERTENSION: ICD-10-CM

## 2023-07-06 RX ORDER — LOSARTAN POTASSIUM AND HYDROCHLOROTHIAZIDE 12.5; 5 MG/1; MG/1
1 TABLET ORAL DAILY
Qty: 90 TABLET | Refills: 0 | Status: SHIPPED | OUTPATIENT
Start: 2023-07-06

## 2023-08-15 ENCOUNTER — OFFICE VISIT (OUTPATIENT)
Dept: INTERNAL MEDICINE CLINIC | Age: 68
End: 2023-08-15

## 2023-08-15 VITALS
DIASTOLIC BLOOD PRESSURE: 76 MMHG | OXYGEN SATURATION: 97 % | HEART RATE: 87 BPM | SYSTOLIC BLOOD PRESSURE: 120 MMHG | HEIGHT: 62 IN | BODY MASS INDEX: 46.56 KG/M2 | WEIGHT: 253 LBS

## 2023-08-15 DIAGNOSIS — I10 PRIMARY HYPERTENSION: ICD-10-CM

## 2023-08-15 DIAGNOSIS — R60.0 LOCALIZED EDEMA: ICD-10-CM

## 2023-08-15 DIAGNOSIS — E55.9 VITAMIN D DEFICIENCY: ICD-10-CM

## 2023-08-15 DIAGNOSIS — R73.03 PREDIABETES: Primary | ICD-10-CM

## 2023-08-15 DIAGNOSIS — E78.5 DYSLIPIDEMIA: ICD-10-CM

## 2023-08-15 RX ORDER — LOSARTAN POTASSIUM AND HYDROCHLOROTHIAZIDE 12.5; 5 MG/1; MG/1
1 TABLET ORAL DAILY
Qty: 90 TABLET | Refills: 0 | Status: SHIPPED | OUTPATIENT
Start: 2023-08-15

## 2023-08-15 RX ORDER — TORSEMIDE 20 MG/1
20 TABLET ORAL DAILY PRN
Qty: 30 TABLET | Refills: 1 | Status: SHIPPED | OUTPATIENT
Start: 2023-08-15

## 2023-08-15 NOTE — PATIENT INSTRUCTIONS
TAKE MED AS ADVISED    DIET/ EXERCISE. FOLLOW UP WITHIN 3 MONTHS / AS NEEDED    FOLLOW UP FOR FASTING [address] [facility] Laboratory Locations - No appointment necessary. ? indicates the location is open Saturdays in addition to Monday through Friday. Call your preferred location for test preparation, business hours and other information you need. [employer] accepts [employer]. CENTRAL  EAST  [place]    ? [place] [id number] [facility], [address]    Ph: 2000 [facility], [address] [facility] [address]    Ph: [phone]   ? [address]    [place], [address] [place] [address]    Ph: 17[address] [facility] [address] [place], [address] [facility]    Ph: [phone] ? [place]   [address]   Ph: [phone]  ? 707 [facility], 211 [facility]    Ph: [place] [address] [facility], 1235 [facility]   Ph: [phone]    NORTH    ? [place], [place] [address]    Ph: [phone]  [facility]   12[address] [facility], [facility] [facility] [address]   Ph: Soren Herrera [place], [address] [facility]: [address] [facility] [address] [place]    Ph: 713 [facility].  [address] [place] [address]    Ph: [phone]

## 2023-08-15 NOTE — PROGRESS NOTES
NO SINUS TENDERNESS  NECK:  SUPPLE, TRACHEA MIDLINE, NT, NO JVD, NO CB, NO LA, NO TM, NO STIFFNESS  CHEST: RESPY EFFORT NL, GOOD AE, NO W/R/C  HEART: S1S2+ REG, NO M/G/R  ABD: OBESE, SOFT, NT, NO HSM, BS+  EXT: ++ EDEMA, NT, PULSES +. LINDA'S -VE, NO ERYTHEMA, NO WARMTH  NEURO: ALERT AND ORIENTED X 3, NO MENINGEAL SIGNS, NO TREMORS, AMBULATING WITH CANE -  + LIMP OTHERWISE, NO NEW FOCAL DEFICITS  PSYCH: FAIRLY GOOD AFFECT  BACK: NT, NO ROM, NO CVA TENDERNESS     PREVIOUS LABS REVIEWED AND D/W PT    ASSESSMENT / PLAN:     Diagnosis Orders   1. Prediabetes  COUNSELLED. ADVISED ON DIET / EXERCISE  ADVISED RISK FACTOR MODIFICATION. F/U LABS TO [facility]. MONITOR  MAKE CHANGES AS NEEDED. 2. Primary hypertension  COUNSELLED. CONTROLLED. CONTINUE MED. LOW NA+ / DASH DIET/ EXERCISE. MONITOR. GOAL </= 130/80  F/U LABS  MAKE CHANGES AS NEEDED. 3. Dyslipidemia  COUNSELLED. ADVISED LOW FAT / CHOL DIET/ EXERCISE.  MONITOR. F/U LABS  GOALS D/W PT.  MAKE CHANGES AS NEEDED. 4. Vitamin D deficiency  COUNSELLED. MONITOR ON VIT D SUPPLEMENT. MAKE CHANGES AS NEEDED. 5. Localized edema  COUNSELLED. PERSISTENT  CHANGE TO DEMADEX 20 MG DAILY / PRN  ADVISED LOW NA+ DIET. ELEVATE LEGS. COMPRESSION STOCKINGS. MONITOR. MAKE CHANGES AS NEEDED. 6. Body mass index (BMI) 45.0-49.9, adult ([address] [facility])  COUNSELLED. DIET/ EXERCISE ADVISED. LIFESTYLE MODIFICATION. WT LOSS ADVISED. MONITOR AND MAKE CHANGES AS NEEDED.                        MEDICATION SIDE EFFECTS D/W PATIENT      RETURN TO CLINIC WITHIN  3 MONTHS / PRN    FOLLOW UP FOR FASTING LABS

## 2023-10-06 DIAGNOSIS — I10 PRIMARY HYPERTENSION: ICD-10-CM

## 2023-10-06 NOTE — TELEPHONE ENCOUNTER
Medication:   Requested Prescriptions     Pending Prescriptions Disp Refills    losartan-hydroCHLOROthiazide (HYZAAR) 50-12.5 MG per tablet [Pharmacy Med Name: LOSARTAN/HCTZ 50/12.5MG TABLETS] 90 tablet 0     Sig: TAKE 1 TABLET BY MOUTH DAILY        Last Filled:      Patient Phone Number: [phone] (home)     Last appt: Visit date not found   Next appt: Visit date not found    Last OARRS:        No data to display

## 2023-10-09 ENCOUNTER — TELEPHONE (OUTPATIENT)
Dept: INTERNAL MEDICINE CLINIC | Age: 68
End: 2023-10-09

## 2023-10-09 DIAGNOSIS — I10 PRIMARY HYPERTENSION: ICD-10-CM

## 2023-10-09 NOTE — TELEPHONE ENCOUNTER
Medication:   Requested Prescriptions     Pending Prescriptions Disp Refills    losartan-hydroCHLOROthiazide (HYZAAR) 50-12.5 MG per tablet 90 tablet 0     Sig: Take 1 tablet by mouth daily        Last Filled:      Patient Phone Number: [phone] (home)     Last appt: 8/15/2023   Next appt: 12/8/2023    Last OARRS:        No data to display

## 2023-10-09 NOTE — TELEPHONE ENCOUNTER
----- Message from Corwin Pierce sent at 10/9/2023  9:20 AM EDT -----  Subject: Refill Request    QUESTIONS  Name of Medication? losartan-hydroCHLOROthiazide (HYZAAR) 50-12.5 MG per   tablet  Patient-reported dosage and instructions? 50-12.5mg  How many days do you have left? 0  [facility]? [id number]  Pharmacy phone number (if available)? [phone]  ---------------------------------------------------------------------------  --------------  Foster MIRANDA  What is the best way for the office to contact you? OK to leave message on   voicemail  Preferred Call Back Phone Number? [phone]  ---------------------------------------------------------------------------  --------------  SCRIPT ANSWERS  Relationship to Patient?  Self

## 2023-10-11 RX ORDER — LOSARTAN POTASSIUM AND HYDROCHLOROTHIAZIDE 12.5; 5 MG/1; MG/1
1 TABLET ORAL DAILY
Qty: 90 TABLET | Refills: 0 | OUTPATIENT
Start: 2023-10-11

## 2023-10-11 RX ORDER — LOSARTAN POTASSIUM AND HYDROCHLOROTHIAZIDE 12.5; 5 MG/1; MG/1
1 TABLET ORAL DAILY
Qty: 90 TABLET | Refills: 0 | Status: SHIPPED | OUTPATIENT
Start: 2023-10-11

## 2023-12-05 ENCOUNTER — TELEMEDICINE (OUTPATIENT)
Dept: PULMONOLOGY | Age: 68
End: 2023-12-05
Payer: COMMERCIAL

## 2023-12-05 DIAGNOSIS — I50.22 CHRONIC SYSTOLIC (CONGESTIVE) HEART FAILURE (HCC): ICD-10-CM

## 2023-12-05 DIAGNOSIS — G47.33 OBSTRUCTIVE SLEEP APNEA (ADULT) (PEDIATRIC): Primary | ICD-10-CM

## 2023-12-05 DIAGNOSIS — I10 ESSENTIAL HYPERTENSION: ICD-10-CM

## 2023-12-05 PROCEDURE — 99214 OFFICE O/P EST MOD 30 MIN: CPT | Performed by: NURSE PRACTITIONER

## 2023-12-05 PROCEDURE — 1123F ACP DISCUSS/DSCN MKR DOCD: CPT | Performed by: NURSE PRACTITIONER

## 2023-12-05 ASSESSMENT — SLEEP AND FATIGUE QUESTIONNAIRES
HOW LIKELY ARE YOU TO NOD OFF OR FALL ASLEEP WHILE SITTING QUIETLY AFTER LUNCH WITHOUT ALCOHOL: 1
HOW LIKELY ARE YOU TO NOD OFF OR FALL ASLEEP WHILE WATCHING TV: 1
HOW LIKELY ARE YOU TO NOD OFF OR FALL ASLEEP WHILE SITTING AND READING: 1
HOW LIKELY ARE YOU TO NOD OFF OR FALL ASLEEP WHEN YOU ARE A PASSENGER IN A CAR FOR AN HOUR WITHOUT A BREAK: 0
HOW LIKELY ARE YOU TO NOD OFF OR FALL ASLEEP WHILE LYING DOWN TO REST IN THE AFTERNOON WHEN CIRCUMSTANCES PERMIT: 2
ESS TOTAL SCORE: 5
HOW LIKELY ARE YOU TO NOD OFF OR FALL ASLEEP WHILE SITTING INACTIVE IN A PUBLIC PLACE: 0
HOW LIKELY ARE YOU TO NOD OFF OR FALL ASLEEP WHILE SITTING AND TALKING TO SOMEONE: 0
HOW LIKELY ARE YOU TO NOD OFF OR FALL ASLEEP IN A CAR, WHILE STOPPED FOR A FEW MINUTES IN TRAFFIC: 0

## 2023-12-05 NOTE — ASSESSMENT & PLAN NOTE
Chronic-Stable: Reviewed and analyzed results of physiologic download from patient's machine and reviewed with patient. Supplies and parts as needed for her machine. These are medically necessary. Limit caffeine use after 3pm. Based on the analyzed data will continue with current settings. .Stable on her machine at current settings, getting benefit from the use, and having minimal side effects. Will see her back in 1 year. Encouraged her to contact the office with any questions or concerns.

## 2023-12-05 NOTE — PROGRESS NOTES
Shahnaz Jang CNP  Angi Turcios[place] [facility] [address] [address], 872 [facility] [phone]   [address] [place] [address], [address] [phone]     Video Visit- Follow up      Assessment/Plan:      1. Obstructive sleep apnea (adult) (pediatric)  Assessment & Plan:   Chronic-Stable: Reviewed and analyzed results of physiologic download from patient's machine and reviewed with patient. Supplies and parts as needed for her machine. These are medically necessary. Limit caffeine use after 3pm. Based on the analyzed data will continue with current settings. .Stable on her machine at current settings, getting benefit from the use, and having minimal side effects. Will see her back in 1 year. Encouraged her to contact the office with any questions or concerns. 2. Chronic systolic (congestive) heart failure  Assessment & Plan:   Chronic- Stable. Discussed the importance of treating obstructive sleep apnea as part of the management of this disorder. Cont any meds per PCP and other physicians. 3. Essential hypertension  Assessment & Plan:   Chronic- Stable. Discussed the importance of treating obstructive sleep apnea as part of the management of this disorder. Cont any meds per PCP and other physicians. 4. Body mass index (BMI) 45.0-49.9, adult [facility])  Assessment & Plan:   Chronic-not stable:  Discussed importance of treating obstructive sleep apnea and getting sufficient sleep to assist with weight control. Discussed weight gain and/or weight loss may require adjustments to machine settings. Encouraged her to work on weight loss through diet and exercise. Reviewed, analyzed, and documented physiologic data from patient's PAP machine. This information was analyzed to assess complexity and medical decision making in regards to further testing and management.     The primary encounter diagnosis was Obstructive sleep apnea (adult)

## 2023-12-08 ENCOUNTER — OFFICE VISIT (OUTPATIENT)
Dept: INTERNAL MEDICINE CLINIC | Age: 68
End: 2023-12-08

## 2023-12-08 VITALS
HEART RATE: 97 BPM | SYSTOLIC BLOOD PRESSURE: 120 MMHG | WEIGHT: 256 LBS | TEMPERATURE: 98.4 F | BODY MASS INDEX: 46.82 KG/M2 | OXYGEN SATURATION: 98 % | DIASTOLIC BLOOD PRESSURE: 78 MMHG

## 2023-12-08 DIAGNOSIS — R60.0 LOCALIZED EDEMA: ICD-10-CM

## 2023-12-08 DIAGNOSIS — E66.01 MORBID OBESITY (HCC): ICD-10-CM

## 2023-12-08 DIAGNOSIS — R73.03 PREDIABETES: ICD-10-CM

## 2023-12-08 DIAGNOSIS — I10 PRIMARY HYPERTENSION: Primary | ICD-10-CM

## 2023-12-08 DIAGNOSIS — E55.9 VITAMIN D DEFICIENCY: ICD-10-CM

## 2023-12-08 DIAGNOSIS — I26.99 ACUTE MASSIVE PULMONARY EMBOLISM (HCC): ICD-10-CM

## 2023-12-08 DIAGNOSIS — E78.5 DYSLIPIDEMIA: ICD-10-CM

## 2023-12-08 DIAGNOSIS — Z23 NEED FOR PNEUMOCOCCAL VACCINATION: ICD-10-CM

## 2023-12-08 RX ORDER — TORSEMIDE 20 MG/1
20 TABLET ORAL DAILY PRN
Qty: 30 TABLET | Refills: 1 | Status: SHIPPED | OUTPATIENT
Start: 2023-12-08

## 2023-12-08 RX ORDER — LOSARTAN POTASSIUM AND HYDROCHLOROTHIAZIDE 12.5; 5 MG/1; MG/1
1 TABLET ORAL DAILY
Qty: 90 TABLET | Refills: 0 | Status: SHIPPED | OUTPATIENT
Start: 2023-12-08

## 2024-01-10 DIAGNOSIS — I10 PRIMARY HYPERTENSION: ICD-10-CM

## 2024-01-10 RX ORDER — LOSARTAN POTASSIUM AND HYDROCHLOROTHIAZIDE 12.5; 5 MG/1; MG/1
1 TABLET ORAL DAILY
Qty: 90 TABLET | Refills: 1 | Status: SHIPPED | OUTPATIENT
Start: 2024-01-10

## 2024-01-10 NOTE — TELEPHONE ENCOUNTER
----- Message from Jennifer Foss sent at 1/10/2024  8:24 AM EST -----  Subject: Refill Request    QUESTIONS  Name of Medication? losartan-hydroCHLOROthiazide (HYZAAR) 50-12.5 MG per   tablet  Patient-reported dosage and instructions? once daily  How many days do you have left? 3  Preferred Pharmacy? [id number]  Pharmacy phone number (if available)? [phone]  Additional Information for Provider? pt has f/u on 1/24  ---------------------------------------------------------------------------  --------------  CALL BACK INFO  What is the best way for the office to contact you? Do not leave any   message, patient will call back for answer  Preferred Call Back Phone Number? [phone]  ---------------------------------------------------------------------------  --------------  SCRIPT ANSWERS  Relationship to Patient? Self

## 2024-01-10 NOTE — TELEPHONE ENCOUNTER
Medication:   Requested Prescriptions     Pending Prescriptions Disp Refills    losartan-hydroCHLOROthiazide (HYZAAR) 50-12.5 MG per tablet 90 tablet 0     Sig: Take 1 tablet by mouth daily        Last Filled:      Patient Phone Number: [phone] (home)     Last appt: 12/8/2023   Next appt: 1/24/2024    Last OARRS:        No data to display

## 2024-01-21 SDOH — HEALTH STABILITY: PHYSICAL HEALTH
ON AVERAGE, HOW MANY DAYS PER WEEK DO YOU ENGAGE IN MODERATE TO STRENUOUS EXERCISE (LIKE A BRISK WALK)?: PATIENT DECLINED

## 2024-01-21 ASSESSMENT — PATIENT HEALTH QUESTIONNAIRE - PHQ9
1. LITTLE INTEREST OR PLEASURE IN DOING THINGS: 0
SUM OF ALL RESPONSES TO PHQ QUESTIONS 1-9: 0
SUM OF ALL RESPONSES TO PHQ9 QUESTIONS 1 & 2: 0
2. FEELING DOWN, DEPRESSED OR HOPELESS: 0

## 2024-01-21 ASSESSMENT — LIFESTYLE VARIABLES
HOW OFTEN DO YOU HAVE A DRINK CONTAINING ALCOHOL: NEVER
HOW MANY STANDARD DRINKS CONTAINING ALCOHOL DO YOU HAVE ON A TYPICAL DAY: PATIENT DOES NOT DRINK

## 2024-02-27 ASSESSMENT — LIFESTYLE VARIABLES
HOW MANY STANDARD DRINKS CONTAINING ALCOHOL DO YOU HAVE ON A TYPICAL DAY: 0
HOW OFTEN DO YOU HAVE SIX OR MORE DRINKS ON ONE OCCASION: 1
HOW MANY STANDARD DRINKS CONTAINING ALCOHOL DO YOU HAVE ON A TYPICAL DAY: PATIENT DOES NOT DRINK
HOW OFTEN DO YOU HAVE A DRINK CONTAINING ALCOHOL: NEVER
HOW OFTEN DO YOU HAVE A DRINK CONTAINING ALCOHOL: 1

## 2024-03-01 ENCOUNTER — OFFICE VISIT (OUTPATIENT)
Dept: INTERNAL MEDICINE CLINIC | Age: 69
End: 2024-03-01

## 2024-03-01 VITALS
SYSTOLIC BLOOD PRESSURE: 120 MMHG | WEIGHT: 264 LBS | HEART RATE: 83 BPM | OXYGEN SATURATION: 96 % | DIASTOLIC BLOOD PRESSURE: 78 MMHG | BODY MASS INDEX: 48.29 KG/M2

## 2024-03-01 DIAGNOSIS — E55.9 VITAMIN D DEFICIENCY: ICD-10-CM

## 2024-03-01 DIAGNOSIS — E78.5 DYSLIPIDEMIA: ICD-10-CM

## 2024-03-01 DIAGNOSIS — I10 PRIMARY HYPERTENSION: ICD-10-CM

## 2024-03-01 DIAGNOSIS — E66.01 MORBID OBESITY (HCC): ICD-10-CM

## 2024-03-01 DIAGNOSIS — J30.89 OTHER ALLERGIC RHINITIS: ICD-10-CM

## 2024-03-01 DIAGNOSIS — Z00.00 MEDICARE ANNUAL WELLNESS VISIT, SUBSEQUENT: Primary | ICD-10-CM

## 2024-03-01 DIAGNOSIS — R73.03 PREDIABETES: ICD-10-CM

## 2024-03-01 DIAGNOSIS — R60.0 LOCALIZED EDEMA: ICD-10-CM

## 2024-03-01 DIAGNOSIS — I26.99 ACUTE MASSIVE PULMONARY EMBOLISM (HCC): ICD-10-CM

## 2024-03-01 PROBLEM — I50.22 CHRONIC SYSTOLIC (CONGESTIVE) HEART FAILURE (HCC): Status: RESOLVED | Noted: 2022-06-06 | Resolved: 2024-03-01

## 2024-03-01 RX ORDER — LORATADINE 10 MG/1
10 TABLET ORAL DAILY PRN
Qty: 90 TABLET | Refills: 0 | Status: SHIPPED | OUTPATIENT
Start: 2024-03-01

## 2024-03-01 RX ORDER — TORSEMIDE 20 MG/1
20 TABLET ORAL DAILY PRN
Qty: 90 TABLET | Refills: 0 | Status: SHIPPED | OUTPATIENT
Start: 2024-03-01

## 2024-03-01 RX ORDER — LOSARTAN POTASSIUM AND HYDROCHLOROTHIAZIDE 12.5; 5 MG/1; MG/1
1 TABLET ORAL DAILY
Qty: 90 TABLET | Refills: 1 | Status: SHIPPED | OUTPATIENT
Start: 2024-03-01

## 2024-03-01 NOTE — PATIENT INSTRUCTIONS

## 2024-03-29 ENCOUNTER — APPOINTMENT (OUTPATIENT)
Dept: CT IMAGING | Age: 69
End: 2024-03-29
Payer: COMMERCIAL

## 2024-03-29 ENCOUNTER — APPOINTMENT (OUTPATIENT)
Dept: GENERAL RADIOLOGY | Age: 69
End: 2024-03-29
Payer: COMMERCIAL

## 2024-03-29 ENCOUNTER — HOSPITAL ENCOUNTER (EMERGENCY)
Age: 69
Discharge: HOME | End: 2024-03-29
Attending: STUDENT IN AN ORGANIZED HEALTH CARE EDUCATION/TRAINING PROGRAM
Payer: COMMERCIAL

## 2024-03-29 VITALS
BODY MASS INDEX: 51.34 KG/M2 | DIASTOLIC BLOOD PRESSURE: 84 MMHG | RESPIRATION RATE: 18 BRPM | SYSTOLIC BLOOD PRESSURE: 144 MMHG | OXYGEN SATURATION: 97 % | WEIGHT: 280.7 LBS | TEMPERATURE: 97.6 F | HEART RATE: 88 BPM

## 2024-03-29 DIAGNOSIS — S39.012A STRAIN OF LUMBAR REGION, INITIAL ENCOUNTER: ICD-10-CM

## 2024-03-29 DIAGNOSIS — M54.2 NECK PAIN: Primary | ICD-10-CM

## 2024-03-29 LAB
GLUCOSE BLD-MCNC: 128 MG/DL (ref 70–99)
PERFORMED ON: ABNORMAL

## 2024-03-29 PROCEDURE — 96372 THER/PROPH/DIAG INJ SC/IM: CPT

## 2024-03-29 PROCEDURE — 99284 EMERGENCY DEPT VISIT MOD MDM: CPT

## 2024-03-29 PROCEDURE — 72131 CT LUMBAR SPINE W/O DYE: CPT

## 2024-03-29 PROCEDURE — 72128 CT CHEST SPINE W/O DYE: CPT

## 2024-03-29 PROCEDURE — 70450 CT HEAD/BRAIN W/O DYE: CPT

## 2024-03-29 PROCEDURE — 72125 CT NECK SPINE W/O DYE: CPT

## 2024-03-29 PROCEDURE — 73562 X-RAY EXAM OF KNEE 3: CPT

## 2024-03-29 PROCEDURE — 73552 X-RAY EXAM OF FEMUR 2/>: CPT

## 2024-03-29 PROCEDURE — 6370000000 HC RX 637 (ALT 250 FOR IP)

## 2024-03-29 PROCEDURE — 72170 X-RAY EXAM OF PELVIS: CPT

## 2024-03-29 PROCEDURE — 6360000002 HC RX W HCPCS

## 2024-03-29 RX ORDER — ACETAMINOPHEN 500 MG
1000 TABLET ORAL EVERY 8 HOURS
Status: DISCONTINUED | OUTPATIENT
Start: 2024-03-29 | End: 2024-03-29 | Stop reason: HOSPADM

## 2024-03-29 RX ORDER — NAPROXEN 250 MG/1
500 TABLET ORAL ONCE
Status: COMPLETED | OUTPATIENT
Start: 2024-03-29 | End: 2024-03-29

## 2024-03-29 RX ORDER — NAPROXEN 250 MG/1
500 TABLET ORAL 2 TIMES DAILY WITH MEALS
Status: DISCONTINUED | OUTPATIENT
Start: 2024-03-29 | End: 2024-03-29

## 2024-03-29 RX ORDER — NAPROXEN SODIUM 550 MG/1
550 TABLET ORAL 2 TIMES DAILY WITH MEALS
Qty: 14 TABLET | Refills: 0 | Status: SHIPPED | OUTPATIENT
Start: 2024-03-29 | End: 2024-04-05

## 2024-03-29 RX ORDER — METHOCARBAMOL 750 MG/1
750 TABLET, FILM COATED ORAL 4 TIMES DAILY
Qty: 28 TABLET | Refills: 0 | Status: SHIPPED | OUTPATIENT
Start: 2024-03-29 | End: 2024-04-05

## 2024-03-29 RX ORDER — ACETAMINOPHEN 500 MG
1000 TABLET ORAL EVERY 8 HOURS
Qty: 42 TABLET | Refills: 0 | Status: SHIPPED | OUTPATIENT
Start: 2024-03-29 | End: 2024-04-05

## 2024-03-29 RX ORDER — HYDROMORPHONE HYDROCHLORIDE 1 MG/ML
0.5 INJECTION, SOLUTION INTRAMUSCULAR; INTRAVENOUS; SUBCUTANEOUS ONCE
Status: COMPLETED | OUTPATIENT
Start: 2024-03-29 | End: 2024-03-29

## 2024-03-29 RX ORDER — METHOCARBAMOL 500 MG/1
750 TABLET, FILM COATED ORAL 3 TIMES DAILY
Status: DISCONTINUED | OUTPATIENT
Start: 2024-03-29 | End: 2024-03-29 | Stop reason: HOSPADM

## 2024-03-29 RX ADMIN — HYDROMORPHONE HYDROCHLORIDE 0.5 MG: 1 INJECTION, SOLUTION INTRAMUSCULAR; INTRAVENOUS; SUBCUTANEOUS at 12:42

## 2024-03-29 RX ADMIN — METHOCARBAMOL 750 MG: 500 TABLET ORAL at 13:40

## 2024-03-29 RX ADMIN — ACETAMINOPHEN 1000 MG: 500 TABLET ORAL at 13:41

## 2024-03-29 RX ADMIN — NAPROXEN 500 MG: 250 TABLET ORAL at 13:41

## 2024-03-29 ASSESSMENT — PAIN - FUNCTIONAL ASSESSMENT: PAIN_FUNCTIONAL_ASSESSMENT: 0-10

## 2024-03-29 ASSESSMENT — PAIN DESCRIPTION - LOCATION: LOCATION: BACK;NECK

## 2024-03-29 ASSESSMENT — PAIN SCALES - GENERAL: PAINLEVEL_OUTOF10: 9
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
## 2024-05-29 ENCOUNTER — TELEPHONE (OUTPATIENT)
Dept: INTERNAL MEDICINE CLINIC | Age: 69
End: 2024-05-29

## 2024-05-29 DIAGNOSIS — Z12.31 ENCOUNTER FOR SCREENING MAMMOGRAM FOR MALIGNANT NEOPLASM OF BREAST: Primary | ICD-10-CM

## 2024-05-29 NOTE — TELEPHONE ENCOUNTER
Pt called in regards of referral       Pt has appointment 5/31 for mammogram and does not have referral that is active .  Please reach out if any question or concern .But please place a referral in for her mammogram  so she may attend her appointment

## 2024-05-31 ENCOUNTER — HOSPITAL ENCOUNTER (OUTPATIENT)
Dept: MAMMOGRAPHY | Age: 69
Discharge: HOME | End: 2024-05-31
Payer: COMMERCIAL

## 2024-05-31 DIAGNOSIS — Z12.31 VISIT FOR SCREENING MAMMOGRAM: ICD-10-CM

## 2024-05-31 PROCEDURE — 77067 SCR MAMMO BI INCL CAD: CPT

## 2024-06-05 DIAGNOSIS — J30.89 OTHER ALLERGIC RHINITIS: ICD-10-CM

## 2024-06-05 RX ORDER — LORATADINE 10 MG/1
10 TABLET ORAL DAILY PRN
Qty: 90 TABLET | Refills: 0 | Status: SHIPPED | OUTPATIENT
Start: 2024-06-05

## 2024-06-05 NOTE — TELEPHONE ENCOUNTER
Please Advise        Medication:   Requested Prescriptions     Pending Prescriptions Disp Refills    loratadine (CLARITIN) 10 MG tablet 90 tablet 0     Sig: Take 1 tablet by mouth daily as needed (PRN)        Last Filled:  3/1/2024    Patient Phone Number: [phone] (home)     Last appt: 3/1/2024   Next appt: 7/19/2024    Last OARRS:        No data to display

## 2024-06-09 DIAGNOSIS — R60.0 LOCALIZED EDEMA: ICD-10-CM

## 2024-06-10 RX ORDER — TORSEMIDE 20 MG/1
TABLET ORAL
Qty: 90 TABLET | Refills: 0 | Status: SHIPPED | OUTPATIENT
Start: 2024-06-10

## 2024-06-10 NOTE — TELEPHONE ENCOUNTER
Medication:   Requested Prescriptions     Pending Prescriptions Disp Refills    torsemide (DEMADEX) 20 MG tablet [Pharmacy Med Name: TORSEMIDE 20MG TABLETS] 90 tablet 0     Sig: TAKE 1 TABLET BY MOUTH DAILY AS NEEDED FOR SWELLING        Last Filled:      Patient Phone Number: [phone] (home)     Last appt: 3/1/2024   Next appt: 7/19/2024    Last OARRS:        No data to display

## 2024-07-16 SDOH — ECONOMIC STABILITY: TRANSPORTATION INSECURITY
IN THE PAST 12 MONTHS, HAS LACK OF TRANSPORTATION KEPT YOU FROM MEETINGS, WORK, OR FROM GETTING THINGS NEEDED FOR DAILY LIVING?: NO

## 2024-07-16 SDOH — ECONOMIC STABILITY: FOOD INSECURITY: WITHIN THE PAST 12 MONTHS, THE FOOD YOU BOUGHT JUST DIDN'T LAST AND YOU DIDN'T HAVE MONEY TO GET MORE.: NEVER TRUE

## 2024-07-16 SDOH — ECONOMIC STABILITY: HOUSING INSECURITY
IN THE LAST 12 MONTHS, WAS THERE A TIME WHEN YOU DID NOT HAVE A STEADY PLACE TO SLEEP OR SLEPT IN A SHELTER (INCLUDING NOW)?: NO

## 2024-07-16 SDOH — ECONOMIC STABILITY: FOOD INSECURITY: WITHIN THE PAST 12 MONTHS, YOU WORRIED THAT YOUR FOOD WOULD RUN OUT BEFORE YOU GOT MONEY TO BUY MORE.: NEVER TRUE

## 2024-07-16 SDOH — ECONOMIC STABILITY: INCOME INSECURITY: HOW HARD IS IT FOR YOU TO PAY FOR THE VERY BASICS LIKE FOOD, HOUSING, MEDICAL CARE, AND HEATING?: NOT HARD AT ALL

## 2024-07-19 ENCOUNTER — OFFICE VISIT (OUTPATIENT)
Dept: INTERNAL MEDICINE CLINIC | Age: 69
End: 2024-07-19

## 2024-07-19 VITALS
WEIGHT: 286.2 LBS | OXYGEN SATURATION: 99 % | TEMPERATURE: 97.5 F | SYSTOLIC BLOOD PRESSURE: 130 MMHG | BODY MASS INDEX: 52.35 KG/M2 | DIASTOLIC BLOOD PRESSURE: 80 MMHG | HEART RATE: 82 BPM

## 2024-07-19 DIAGNOSIS — R60.0 LOCALIZED EDEMA: ICD-10-CM

## 2024-07-19 DIAGNOSIS — E55.9 VITAMIN D DEFICIENCY: ICD-10-CM

## 2024-07-19 DIAGNOSIS — R73.03 PREDIABETES: ICD-10-CM

## 2024-07-19 DIAGNOSIS — J30.89 OTHER ALLERGIC RHINITIS: ICD-10-CM

## 2024-07-19 DIAGNOSIS — E66.01 MORBID OBESITY (HCC): ICD-10-CM

## 2024-07-19 DIAGNOSIS — I10 PRIMARY HYPERTENSION: ICD-10-CM

## 2024-07-19 DIAGNOSIS — R06.02 SOBOE (SHORTNESS OF BREATH ON EXERTION): Primary | ICD-10-CM

## 2024-07-19 DIAGNOSIS — E78.5 DYSLIPIDEMIA: ICD-10-CM

## 2024-07-19 DIAGNOSIS — Z02.9 ENCOUNTERS FOR ADMINISTRATIVE PURPOSE: ICD-10-CM

## 2024-07-19 DIAGNOSIS — I26.99 ACUTE MASSIVE PULMONARY EMBOLISM (HCC): ICD-10-CM

## 2024-07-19 RX ORDER — LOSARTAN POTASSIUM AND HYDROCHLOROTHIAZIDE 12.5; 5 MG/1; MG/1
1 TABLET ORAL DAILY
Qty: 90 TABLET | Refills: 1 | Status: SHIPPED | OUTPATIENT
Start: 2024-07-19

## 2024-07-19 RX ORDER — FUROSEMIDE 20 MG/1
20 TABLET ORAL DAILY PRN
Qty: 30 TABLET | Refills: 1 | Status: SHIPPED | OUTPATIENT
Start: 2024-07-19

## 2024-07-19 NOTE — PATIENT INSTRUCTIONS
TAKE MED AS ADVISED    DIET/ EXERCISE.    FOLLOW UP WITHIN 3 MONTHS / AS NEEDED    FOLLOW UP FOR LABS, X RAY    [facility] Locations - No appointment necessary.  ? indicates the location is open Saturdays in addition to Monday through Friday.   Call your preferred location for test preparation, business hours and other information you need.   [facility] accepts all insurances.  CENTRAL  EAST  [place]    ? [place]   [address]   [address]   [place] [address]    Ph: [phone]  [facility]   [address] [place] [address]    Ph: [phone]   ? [place]   [address]    [place] [address]    Ph: [phone]     [facility]   [address] [place] [address]    Ph: [phone] ? [place]   [address] [place] [address]   Ph: [phone]  ? [place] [facility]   [address] [place] [address]    Ph: [phone]      [place]   [address] [place] [address]   Ph: [phone]    NORTH    ? [facility]   [address] [place] [address]    Ph: [phone]  [facility]   [address] [place] [address]   Ph: [phone]  [place]   [address] [place], [place] [address]vd.   [facility], [address]    PH: [phone]    [facility]   [address] [place] [address]    Ph: [phone]  [place]  [address] [place] [address]  Ph: [phone]  [facility]   [address] [place] [address]    Ph: [phone]

## 2024-07-19 NOTE — PROGRESS NOTES
SUBJECTIVE:  Andreia Colon is a 69 y.o. female HERE FOR   Chief Complaint   Patient presents with    Follow-up    Shortness of Breath     PT HAVING A HARD TIME CATCHING HER BREATH. ONLY HAPPENS WHEN SHE HAS A LONG DISTANCE TO WALK       PT HERE FOR EVAL    C/O SOBOE - STATES PAST 3 WEEKS. IMPROVED AT REST. DENIES CP. + LEG SWELLING - PERSISTENT. DENIES ORTHOPNEA, NO PND    HTN - TAKING MED. BP NOTED. ? DIET / EXERCISE COMPLIANCE. HEADACHE No, DIZZINESS No  DYSLIPIDEMIA -  DIET / EXERCISE REVIEWED. IMPROVED - LAB D/W PT.  PREDIABETES -  DIET / EXERCISE REVIEWED. LAB D/W PT  VIT D DEF - TAKING MED . LAB D/W PT.  EDEMA - HENRI LE. PERSISTENT. TAKING MED -  NOT HELPING MUCH. NO PAIN . NO ORTHOPNEA, NO PND  MORBID OBESITY - DIET / EXERCISE REVIEWED. WT NOTED. CONCERNED OF INABILITY TO LOSE WEIGHT  PULM EMBOLISM - DENIES CP. SOB AS ABOVE  ALLERGIC RHINITIS -  NO RHINORRHEA ,  OCC NASAL CONGESTION, NO POSTNASAL DRAINAGE, NO SINUS PRESSURE, NO HA, + OCC SNEEZING, + WATERY ITCHY EYES  PT REQUESTING HANDICAP PLACARD       DENIES CP, + SOB - AS ABOVE, No PALPITATIONS, No COUGH, NO F/C  No ABD PAIN, No N/V, No DIARRHEA, No CONSTIPATION, No MELENA, No HEMATOCHEZIA.  No DYSURIA, No FREQ, No URGENCY, No HEMATURIA    PMH: REVIEWED AND UPDATED TODAY    PSH: REVIEWED AND UPDATED TODAY    SOCIAL HX: REVIEWED AND UPDATED TODAY    FAMILY HX: REVIEWED AND UPDATED TODAY    ALLERGY:  Patient has no known allergies.    MEDS: REVIEWED  Prior to Visit Medications    Medication Sig Taking? Authorizing Provider   torsemide (DEMADEX) 20 MG tablet TAKE 1 TABLET BY MOUTH DAILY AS NEEDED FOR SWELLING Yes Lilibeth Gusman MD   loratadine (CLARITIN) 10 MG tablet Take 1 tablet by mouth daily as needed (PRN) Yes Lilibeth Gusman MD   vitamin D (VITAMIN D3) 25 MCG (1000 UT) CAPS Take 1 capsule by mouth daily Yes Lilibeth Gusman MD   losartan-hydroCHLOROthiazide (HYZAAR) 50-12.5 MG per tablet Take 1 tablet by mouth daily Yes Lilibeth Gusman MD

## 2024-08-05 DIAGNOSIS — R06.02 SOBOE (SHORTNESS OF BREATH ON EXERTION): ICD-10-CM

## 2024-08-05 DIAGNOSIS — E55.9 VITAMIN D DEFICIENCY: ICD-10-CM

## 2024-08-05 DIAGNOSIS — R73.03 PREDIABETES: ICD-10-CM

## 2024-08-05 DIAGNOSIS — E78.5 DYSLIPIDEMIA: ICD-10-CM

## 2024-08-05 DIAGNOSIS — I10 PRIMARY HYPERTENSION: ICD-10-CM

## 2024-08-05 DIAGNOSIS — R60.0 LOCALIZED EDEMA: ICD-10-CM

## 2024-08-06 LAB
25(OH)D3 SERPL-MCNC: 80.4 NG/ML
ALBUMIN SERPL-MCNC: 3.8 G/DL (ref 3.4–5)
ALBUMIN/GLOB SERPL: 1.3 {RATIO} (ref 1.1–2.2)
ALP SERPL-CCNC: 78 U/L (ref 40–129)
ALT SERPL-CCNC: 25 U/L (ref 10–40)
ANION GAP SERPL CALCULATED.3IONS-SCNC: 15 MMOL/L (ref 3–16)
AST SERPL-CCNC: 34 U/L (ref 15–37)
BASOPHILS # BLD: 0 K/UL (ref 0–0.2)
BASOPHILS NFR BLD: 0.5 %
BILIRUB SERPL-MCNC: 0.4 MG/DL (ref 0–1)
BUN SERPL-MCNC: 15 MG/DL (ref 7–20)
CALCIUM SERPL-MCNC: 9.3 MG/DL (ref 8.3–10.6)
CHLORIDE SERPL-SCNC: 104 MMOL/L (ref 99–110)
CO2 SERPL-SCNC: 22 MMOL/L (ref 21–32)
CREAT SERPL-MCNC: 0.8 MG/DL (ref 0.6–1.2)
CREAT UR-MCNC: 78.2 MG/DL (ref 28–259)
DEPRECATED RDW RBC AUTO: 13.5 % (ref 12.4–15.4)
EOSINOPHIL # BLD: 0.1 K/UL (ref 0–0.6)
EOSINOPHIL NFR BLD: 2.1 %
EST. AVERAGE GLUCOSE BLD GHB EST-MCNC: 105.4 MG/DL
GFR SERPLBLD CREATININE-BSD FMLA CKD-EPI: 79 ML/MIN/{1.73_M2}
GLUCOSE SERPL-MCNC: 104 MG/DL (ref 70–99)
HBA1C MFR BLD: 5.3 %
HCT VFR BLD AUTO: 38.3 % (ref 36–48)
HGB BLD-MCNC: 13 G/DL (ref 12–16)
LYMPHOCYTES # BLD: 1.2 K/UL (ref 1–5.1)
LYMPHOCYTES NFR BLD: 21.2 %
MCH RBC QN AUTO: 32.6 PG (ref 26–34)
MCHC RBC AUTO-ENTMCNC: 34.1 G/DL (ref 31–36)
MCV RBC AUTO: 95.6 FL (ref 80–100)
MICROALBUMIN UR DL<=1MG/L-MCNC: <1.2 MG/DL
MICROALBUMIN/CREAT UR: NORMAL MG/G (ref 0–30)
MONOCYTES # BLD: 0.7 K/UL (ref 0–1.3)
MONOCYTES NFR BLD: 12 %
NEUTROPHILS # BLD: 3.6 K/UL (ref 1.7–7.7)
NEUTROPHILS NFR BLD: 64.2 %
PLATELET # BLD AUTO: 189 K/UL (ref 135–450)
PMV BLD AUTO: 8.3 FL (ref 5–10.5)
POTASSIUM SERPL-SCNC: 4.2 MMOL/L (ref 3.5–5.1)
PROT SERPL-MCNC: 6.7 G/DL (ref 6.4–8.2)
RBC # BLD AUTO: 4 M/UL (ref 4–5.2)
SODIUM SERPL-SCNC: 141 MMOL/L (ref 136–145)
T4 FREE SERPL-MCNC: 1.1 NG/DL (ref 0.9–1.8)
TSH SERPL DL<=0.005 MIU/L-ACNC: 4.61 UIU/ML (ref 0.27–4.2)
WBC # BLD AUTO: 5.6 K/UL (ref 4–11)

## 2024-09-19 ENCOUNTER — TELEPHONE (OUTPATIENT)
Dept: INTERNAL MEDICINE CLINIC | Age: 69
End: 2024-09-19

## 2024-09-19 DIAGNOSIS — J30.89 OTHER ALLERGIC RHINITIS: ICD-10-CM

## 2024-09-19 RX ORDER — LORATADINE 10 MG/1
10 TABLET ORAL DAILY PRN
Qty: 90 TABLET | Refills: 0 | Status: SHIPPED | OUTPATIENT
Start: 2024-09-19

## 2024-10-03 ENCOUNTER — HOSPITAL ENCOUNTER (OUTPATIENT)
Dept: GENERAL RADIOLOGY | Age: 69
Discharge: HOME | End: 2024-10-03
Payer: COMMERCIAL

## 2024-10-03 DIAGNOSIS — R06.02 SOBOE (SHORTNESS OF BREATH ON EXERTION): ICD-10-CM

## 2024-10-03 DIAGNOSIS — R60.0 LOCALIZED EDEMA: ICD-10-CM

## 2024-10-03 PROCEDURE — 71046 X-RAY EXAM CHEST 2 VIEWS: CPT

## 2024-11-01 ENCOUNTER — FOLLOWUP TELEPHONE ENCOUNTER (OUTPATIENT)
Dept: INTERNAL MEDICINE CLINIC | Age: 69
End: 2024-11-01

## 2024-11-01 ENCOUNTER — OFFICE VISIT (OUTPATIENT)
Dept: INTERNAL MEDICINE CLINIC | Age: 69
End: 2024-11-01

## 2024-11-01 ENCOUNTER — TELEPHONE (OUTPATIENT)
Dept: INTERNAL MEDICINE CLINIC | Age: 69
End: 2024-11-01

## 2024-11-01 VITALS
TEMPERATURE: 97.8 F | BODY MASS INDEX: 52.82 KG/M2 | OXYGEN SATURATION: 99 % | SYSTOLIC BLOOD PRESSURE: 124 MMHG | HEART RATE: 83 BPM | DIASTOLIC BLOOD PRESSURE: 72 MMHG | WEIGHT: 288.8 LBS

## 2024-11-01 DIAGNOSIS — R06.02 SOBOE (SHORTNESS OF BREATH ON EXERTION): Primary | ICD-10-CM

## 2024-11-01 DIAGNOSIS — R09.02 HYPOXIA: ICD-10-CM

## 2024-11-01 DIAGNOSIS — G47.33 OBSTRUCTIVE SLEEP APNEA (ADULT) (PEDIATRIC): ICD-10-CM

## 2024-11-01 DIAGNOSIS — E03.8 SUBCLINICAL HYPOTHYROIDISM: ICD-10-CM

## 2024-11-01 PROBLEM — Z78.9 OTHER SPECIFIED HEALTH STATUS: Status: ACTIVE | Noted: 2024-11-01

## 2024-11-01 PROBLEM — K21.9 GASTROESOPHAGEAL REFLUX DISEASE: Status: ACTIVE | Noted: 2024-11-01

## 2024-11-01 PROBLEM — I85.00 ESOPHAGEAL VARICES (HCC): Status: ACTIVE | Noted: 2024-03-01

## 2024-11-01 NOTE — TELEPHONE ENCOUNTER
Called patient at the request of Dr. Razo to give her information regarding an imaging scan, and to give her the phone number to [facility] Scheduling. Patient understood the instructions.

## 2024-11-01 NOTE — PROGRESS NOTES
;Last colonoscopy per care gaps done November 2022, repeat due 2025; will consider gyn history at future visit)  History of sleeve gastrectomy 02/2019 at the time presurgery BMI 43.9, Today BMI 52.  Will get CTPA given coagulopathy risk  - CTA PULMONARY W CONTRAST; Future    2.  Hypoxia  Onset last year without inciting cause , denies other symptoms  Hypoxia on exertion noted on ambulatory oxygen test, 88%RA  after walking/pausing then 96%RA HR 90 at rest before walking  Recent chest x-ray on 10/2024 showing mild bibasilar pleural-parenchymal airspace disease may represent atelectasis, pneumonia, or edema.  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  Will get CTPA given coagulopathy risk  - CTA PULMONARY W CONTRAST; Future    3. BMI 50.0-59.9, adult  History of sleeve gastrectomy 02/2019 at the time presurgery BMI 43.9, Today BMI 52.  A1c 5.3 on 8/2024.  Normal lipid in 11/2023  Would like to discuss healthy options for weight loss  TSH elevated at 4.6, normal T4 1.1- Subclinical hypothyroidism  Will get repeat labs    4. Obstructive sleep apnea (adult) (pediatric)  Compliant with CPAP  Patient to keep upcoming appointment with specialist    5.  Subclinical hypothyroidism  BMI 52.8  Would like to discuss healthy options for weight loss  Per previous labs TSH elevated at 4.6, normal T4 1.1- Subclinical hypothyroidism  Will get repeat labs at next visit      Spent about 59 minutes discussing patient's symptoms, reviewing

## 2024-11-01 NOTE — PATIENT INSTRUCTIONS
Call pulmonology for PFT pulmonary function test  Cautious exercising   Deep breathing exercises using incentive spirometer  Follow up after visit with specialist

## 2024-11-01 NOTE — TELEPHONE ENCOUNTER
Called patient as a follow up. Dr. aRzo added TSH blood work to her labs, and imaging. Spoke with patient, she understood.

## 2024-11-14 ENCOUNTER — HOSPITAL ENCOUNTER (OUTPATIENT)
Dept: CT IMAGING | Age: 69
Discharge: HOME | End: 2024-11-14
Attending: STUDENT IN AN ORGANIZED HEALTH CARE EDUCATION/TRAINING PROGRAM
Payer: COMMERCIAL

## 2024-11-14 DIAGNOSIS — R09.02 HYPOXIA: ICD-10-CM

## 2024-11-14 DIAGNOSIS — E03.8 SUBCLINICAL HYPOTHYROIDISM: ICD-10-CM

## 2024-11-14 DIAGNOSIS — R06.02 SOBOE (SHORTNESS OF BREATH ON EXERTION): ICD-10-CM

## 2024-11-14 LAB
PERFORMED ON: NORMAL
POC CREATININE: 0.9 MG/DL (ref 0.6–1.2)
POC SAMPLE TYPE: NORMAL

## 2024-11-14 PROCEDURE — 6360000004 HC RX CONTRAST MEDICATION: Performed by: STUDENT IN AN ORGANIZED HEALTH CARE EDUCATION/TRAINING PROGRAM

## 2024-11-14 PROCEDURE — 71275 CT ANGIOGRAPHY CHEST: CPT

## 2024-11-14 PROCEDURE — 82565 ASSAY OF CREATININE: CPT

## 2024-11-14 RX ORDER — IOPAMIDOL 755 MG/ML
75 INJECTION, SOLUTION INTRAVASCULAR
Status: COMPLETED | OUTPATIENT
Start: 2024-11-14 | End: 2024-11-14

## 2024-11-14 RX ADMIN — IOPAMIDOL 75 ML: 755 INJECTION, SOLUTION INTRAVENOUS at 11:42

## 2024-11-15 ENCOUNTER — TELEPHONE (OUTPATIENT)
Dept: PULMONOLOGY | Age: 69
End: 2024-11-15

## 2024-11-15 LAB — TSH SERPL DL<=0.005 MIU/L-ACNC: 3.95 UIU/ML (ref 0.27–4.2)

## 2024-11-15 NOTE — TELEPHONE ENCOUNTER
Ref by Dr KAYE Razo for SOB, She had a CTA Pulmonary w contrast on 11/15/24, CXR on 10/03/24.  Would you like her to have PFTS before her appt with you on 12/20/24?

## 2024-12-10 ENCOUNTER — TELEMEDICINE (OUTPATIENT)
Dept: PULMONOLOGY | Age: 69
End: 2024-12-10
Payer: COMMERCIAL

## 2024-12-10 VITALS — HEIGHT: 62 IN | BODY MASS INDEX: 47.84 KG/M2 | WEIGHT: 260 LBS

## 2024-12-10 DIAGNOSIS — I10 HYPERTENSION, UNSPECIFIED TYPE: ICD-10-CM

## 2024-12-10 DIAGNOSIS — E66.813 CLASS 3 SEVERE OBESITY DUE TO EXCESS CALORIES WITH SERIOUS COMORBIDITY AND BODY MASS INDEX (BMI) OF 45.0 TO 49.9 IN ADULT: ICD-10-CM

## 2024-12-10 DIAGNOSIS — E66.01 CLASS 3 SEVERE OBESITY DUE TO EXCESS CALORIES WITH SERIOUS COMORBIDITY AND BODY MASS INDEX (BMI) OF 45.0 TO 49.9 IN ADULT: ICD-10-CM

## 2024-12-10 DIAGNOSIS — G47.33 OBSTRUCTIVE SLEEP APNEA (ADULT) (PEDIATRIC): Primary | ICD-10-CM

## 2024-12-10 PROCEDURE — 1123F ACP DISCUSS/DSCN MKR DOCD: CPT | Performed by: NURSE PRACTITIONER

## 2024-12-10 PROCEDURE — 1160F RVW MEDS BY RX/DR IN RCRD: CPT | Performed by: NURSE PRACTITIONER

## 2024-12-10 PROCEDURE — G2211 COMPLEX E/M VISIT ADD ON: HCPCS | Performed by: NURSE PRACTITIONER

## 2024-12-10 PROCEDURE — 99214 OFFICE O/P EST MOD 30 MIN: CPT | Performed by: NURSE PRACTITIONER

## 2024-12-10 PROCEDURE — 1159F MED LIST DOCD IN RCRD: CPT | Performed by: NURSE PRACTITIONER

## 2024-12-10 ASSESSMENT — SLEEP AND FATIGUE QUESTIONNAIRES
HOW LIKELY ARE YOU TO NOD OFF OR FALL ASLEEP WHILE WATCHING TV: WOULD NEVER DOZE
ESS TOTAL SCORE: 3
HOW LIKELY ARE YOU TO NOD OFF OR FALL ASLEEP WHILE SITTING INACTIVE IN A PUBLIC PLACE: WOULD NEVER DOZE
HOW LIKELY ARE YOU TO NOD OFF OR FALL ASLEEP WHILE SITTING AND TALKING TO SOMEONE: WOULD NEVER DOZE
HOW LIKELY ARE YOU TO NOD OFF OR FALL ASLEEP WHILE LYING DOWN TO REST IN THE AFTERNOON WHEN CIRCUMSTANCES PERMIT: SLIGHT CHANCE OF DOZING
HOW LIKELY ARE YOU TO NOD OFF OR FALL ASLEEP WHILE SITTING AND READING: MODERATE CHANCE OF DOZING
HOW LIKELY ARE YOU TO NOD OFF OR FALL ASLEEP WHEN YOU ARE A PASSENGER IN A CAR FOR AN HOUR WITHOUT A BREAK: WOULD NEVER DOZE
HOW LIKELY ARE YOU TO NOD OFF OR FALL ASLEEP IN A CAR, WHILE STOPPED FOR A FEW MINUTES IN TRAFFIC: WOULD NEVER DOZE
HOW LIKELY ARE YOU TO NOD OFF OR FALL ASLEEP WHILE SITTING QUIETLY AFTER LUNCH WITHOUT ALCOHOL: WOULD NEVER DOZE

## 2024-12-10 NOTE — ASSESSMENT & PLAN NOTE
Chronic-Stable: Reviewed and analyzed results of physiologic download from patient's machine and reviewed with patient.  Supplies and parts as needed for her machine.  These are medically necessary.  Limit caffeine use after 3pm. Based on the analyzed data will continue with current settings. Stable on her machine at current settings, getting benefit from the use, and having minimal side effects. Discussed could consider trial pressure increase, she declines at this time. Will see her back in 1 year. Encouraged her to contact the office with any questions or concerns.

## 2024-12-10 NOTE — PROGRESS NOTES
Diagnosis: [x] TERRANCE (G47.33) [] CSA (G47.31) [] Apnea (G47.30)   Length of Need: [x] 15 Months [] 99 Months [] Other:   Machine (COLLIN!): [] Respironics Dream Station      Auto [] ResMed AirSense     Auto [] Other:     []  CPAP () [] Bilevel ()   Mode: [] Auto [] Spontaneous    Mode: [] Auto [] Spontaneous            Comfort Settings:      Humidifier: [] Heated ()        [x] Water chamber replacement ()/ 1 per 6 months        Mask:   [x] Nasal () /1 per 3 months [] Full Face () /1 per 3 months   [x] Patient choice -Size and fit mask [] Patient Choice - Size and fit mask   [] Dispense: [] Dispense:   [x] Headgear () / 1 per 3 months [] Headgear () / 1 per 3 months   [x] Replacement Nasal Cushion ()/2 per month [] Interface Replacement ()/1 per month   [] Replacement Nasal Pillows ()/2 per month         Tubing: [x] Heated ()/1 per 3 months    [] Standard ()/1 per 3 months [] Other:           Filters: [x] Non-disposable ()/1 per 6 months     [x] Ultra-Fine, Disposable ()/2 per month        Miscellaneous: [] Chin Strap ()/ 1 per 6 months [] O2 bleed-in:        LPM   [] Oxymetry on CPAP/Bilevel []  Other:         Start Order Date: 12/10/24    MEDICAL JUSTIFICATION:  I, the undersigned, certify that the above prescribed supplies are medically necessary for this patient’s wellbeing.  In my opinion, the supplies are both reasonable and necessary in reference to accepted standards of medicalpractice in treatment of this patient’s condition.    GEETHA MILLS NP    NPI: [id number]       Order Signed Date: 12/10/24  [facility]  Pulmonary, Sleep, and Critical Care    Pulmonary, Sleep, and Critical Care  [facility] [facility] [address]                          [address] [place] [address] [address]  [place] [address]                                    [place] [address]  Phone: [phone]    Fax:

## 2024-12-10 NOTE — PROGRESS NOTES
of 45.0 to 49.9 in adult were also pertinent to this visit. The chronic medical conditions listed are directly related to the primary diagnosis listed above.  The management of the primary diagnosis affects the secondary diagnosis and vice versa.       Subjective:     Patient ID: Andreia Colon is a 69 y.o. female.    Chief Complaint   Patient presents with    Sleep Apnea     Subjective   HPI:    Machine Modem/Download Info:  Compliance (hours/night): 8.59 hrs/night  % of nights >= 4 hrs: 100 %  Download AHI (/hour): 0.4 /HR  Average CPAP Pressure : 9.3 cmH2O      APAP - Settings  Pressure Min: 7 cmH2O  Pressure Max: 17 cmH2O                   PAP Mask  Mask Type: Nasal mask     Andreia Colon presents today for follow-up for sleep apnea. she reports she is doing well with her machine.  The pressure on her machine is comfortable and she is waking rested for the most part. Occasionally, will wake and feel groggy. she denies headaches, congestion, nosebleeds, dryness, aerophagia, or drowsiness while driving. her mask is comfortable and is fitting well.    [facility]    [place] Sleepiness Score: 3    Current Outpatient Medications   Medication Instructions    Compression Stockings MISC Does not apply, 3 pair. Put on in am and off at bed time. 20-30 mm    furosemide (LASIX) 20 mg, Oral, DAILY PRN    loratadine (CLARITIN) 10 mg, Oral, DAILY PRN    losartan-hydroCHLOROthiazide (HYZAAR) 50-12.5 MG per tablet 1 tablet, Oral, DAILY    vitamin D 1,000 Units, Oral, DAILY      Andreia Colon, was evaluated through a synchronous (real-time) audio-video encounter. The patient (or guardian if applicable) is aware that this is a billable service, which includes applicable co-pays. This Virtual Visit was conducted with patient's (and/or legal guardian's) consent. Patient identification was verified, and a caregiver was present when appropriate.   The patient was located at Other: [place], [place]  Provider was located

## 2024-12-20 ENCOUNTER — OFFICE VISIT (OUTPATIENT)
Dept: PULMONOLOGY | Age: 69
End: 2024-12-20

## 2024-12-20 VITALS
WEIGHT: 291 LBS | DIASTOLIC BLOOD PRESSURE: 82 MMHG | HEIGHT: 62 IN | SYSTOLIC BLOOD PRESSURE: 126 MMHG | OXYGEN SATURATION: 94 % | HEART RATE: 113 BPM | RESPIRATION RATE: 18 BRPM | BODY MASS INDEX: 53.55 KG/M2

## 2024-12-20 DIAGNOSIS — I27.20 PULMONARY HYPERTENSION (HCC): ICD-10-CM

## 2024-12-20 DIAGNOSIS — I27.20 PULMONARY HYPERTENSION (HCC): Primary | ICD-10-CM

## 2024-12-20 DIAGNOSIS — E66.01 OBESITY, MORBID, BMI 50 OR HIGHER: ICD-10-CM

## 2024-12-20 DIAGNOSIS — R06.02 SHORTNESS OF BREATH: ICD-10-CM

## 2024-12-20 LAB
NT-PROBNP SERPL-MCNC: <36 PG/ML (ref 0–124)
TROPONIN, HIGH SENSITIVITY: 14 NG/L (ref 0–14)

## 2024-12-20 NOTE — ASSESSMENT & PLAN NOTE
Suspect multifactorial, pulmonary hypertension and obesity with deconditioning believed to be the main culprit.  -Continue pulm hypertension workup

## 2024-12-20 NOTE — PROGRESS NOTES
Future    Return to clinic 2 months w/ MD. Call or RTC sooner if symptoms persist or worsen acutely.      The note was completed using EMR and Dragon dictation system. Every effort was made to ensure accuracy; however, inadvertent computerized transcription errors may be present.    Vladimir Zamora \"[facility]\" Susan Clark MD  Pulmonary and Critical Care Medicine

## 2024-12-20 NOTE — ASSESSMENT & PLAN NOTE
Gaining weight despite hx of sleeve gastrectomy.   -Weight loss encouraged, dietary recommendations discussed.

## 2024-12-20 NOTE — ASSESSMENT & PLAN NOTE
Findings were just refilled for hypertension were noted on CTPA, patient reported history of heart failure at some point and is known to have TERRANCE on CPAP, question whether this is WHO 2-3.  -Echo ordered today  -BNP and troponins also ordered.

## 2024-12-30 DIAGNOSIS — J30.89 OTHER ALLERGIC RHINITIS: ICD-10-CM

## 2024-12-30 RX ORDER — LORATADINE 10 MG/1
10 TABLET ORAL DAILY PRN
Qty: 90 TABLET | Refills: 0 | Status: SHIPPED | OUTPATIENT
Start: 2024-12-30

## 2024-12-30 NOTE — TELEPHONE ENCOUNTER
Medication:   Requested Prescriptions     Pending Prescriptions Disp Refills    loratadine (CLARITIN) 10 MG tablet [Pharmacy Med Name: ALLERGY RELF (LORATADINE) 10MG TABS] 90 tablet 0     Sig: TAKE 1 TABLET BY MOUTH DAILY AS NEEDED        Last Filled:      Patient Phone Number: [phone] (home)     Last appt: 11/1/2024   Next appt: Visit date not found    Last OARRS:        No data to display

## 2025-01-17 ENCOUNTER — HOSPITAL ENCOUNTER (OUTPATIENT)
Dept: PULMONOLOGY | Age: 70
Discharge: HOME | End: 2025-01-17
Attending: INTERNAL MEDICINE
Payer: COMMERCIAL

## 2025-01-17 ENCOUNTER — HOSPITAL ENCOUNTER (OUTPATIENT)
Age: 70
Discharge: HOME | End: 2025-01-19
Attending: INTERNAL MEDICINE
Payer: COMMERCIAL

## 2025-01-17 VITALS — WEIGHT: 291 LBS | HEIGHT: 62 IN | BODY MASS INDEX: 53.55 KG/M2

## 2025-01-17 DIAGNOSIS — I27.20 PULMONARY HYPERTENSION (HCC): ICD-10-CM

## 2025-01-17 LAB
ECHO AO ASC DIAM: 3.5 CM
ECHO AO ASCENDING AORTA INDEX: 1.56 CM/M2
ECHO AO ROOT DIAM: 3.2 CM
ECHO AO ROOT INDEX: 1.43 CM/M2
ECHO AV AREA PEAK VELOCITY: 2.6 CM2
ECHO AV AREA VTI: 2.8 CM2
ECHO AV AREA/BSA PEAK VELOCITY: 1.2 CM2/M2
ECHO AV AREA/BSA VTI: 1.3 CM2/M2
ECHO AV MEAN GRADIENT: 7 MMHG
ECHO AV MEAN VELOCITY: 1.2 M/S
ECHO AV PEAK GRADIENT: 12 MMHG
ECHO AV PEAK VELOCITY: 1.8 M/S
ECHO AV VELOCITY RATIO: 0.72
ECHO AV VTI: 30.1 CM
ECHO BSA: 2.4 M2
ECHO EST RA PRESSURE: 3 MMHG
ECHO IVC EXP: 1.2 CM
ECHO LA AREA 2C: 22.6 CM2
ECHO LA AREA 4C: 19.5 CM2
ECHO LA MAJOR AXIS: 5.9 CM
ECHO LA MINOR AXIS: 6 CM
ECHO LA VOL BP: 60 ML (ref 22–52)
ECHO LA VOL MOD A2C: 70 ML (ref 22–52)
ECHO LA VOL MOD A4C: 52 ML (ref 22–52)
ECHO LA VOL/BSA BIPLANE: 27 ML/M2 (ref 16–34)
ECHO LA VOLUME INDEX MOD A2C: 31 ML/M2 (ref 16–34)
ECHO LA VOLUME INDEX MOD A4C: 23 ML/M2 (ref 16–34)
ECHO LV E' LATERAL VELOCITY: 11.3 CM/S
ECHO LV E' SEPTAL VELOCITY: 7.72 CM/S
ECHO LV EDV 3D: 151 ML
ECHO LV EDV INDEX 3D: 67 ML/M2
ECHO LV EJECTION FRACTION 3D: 53 %
ECHO LV ESV 3D: 70 ML
ECHO LV ESV INDEX 3D: 31 ML/M2
ECHO LV FRACTIONAL SHORTENING: 38 % (ref 28–44)
ECHO LV INTERNAL DIMENSION DIASTOLE INDEX: 2.14 CM/M2
ECHO LV INTERNAL DIMENSION DIASTOLIC: 4.8 CM (ref 3.9–5.3)
ECHO LV INTERNAL DIMENSION SYSTOLIC INDEX: 1.34 CM/M2
ECHO LV INTERNAL DIMENSION SYSTOLIC: 3 CM
ECHO LV IVSD: 0.8 CM (ref 0.6–0.9)
ECHO LV MASS 2D: 147.8 G (ref 67–162)
ECHO LV MASS 3D INDEX: 126.3 G/M2
ECHO LV MASS 3D: 283 G
ECHO LV MASS INDEX 2D: 66 G/M2 (ref 43–95)
ECHO LV POSTERIOR WALL DIASTOLIC: 1 CM (ref 0.6–0.9)
ECHO LV RELATIVE WALL THICKNESS RATIO: 0.42
ECHO LVOT AREA: 3.5 CM2
ECHO LVOT AV VTI INDEX: 0.81
ECHO LVOT DIAM: 2.1 CM
ECHO LVOT MEAN GRADIENT: 4 MMHG
ECHO LVOT PEAK GRADIENT: 7 MMHG
ECHO LVOT PEAK VELOCITY: 1.3 M/S
ECHO LVOT STROKE VOLUME INDEX: 37.7 ML/M2
ECHO LVOT SV: 84.5 ML
ECHO LVOT VTI: 24.4 CM
ECHO MV A VELOCITY: 1.04 M/S
ECHO MV E DECELERATION TIME (DT): 151 MS
ECHO MV E VELOCITY: 0.74 M/S
ECHO MV E/A RATIO: 0.71
ECHO MV E/E' LATERAL: 6.55
ECHO MV E/E' RATIO (AVERAGED): 8.07
ECHO MV E/E' SEPTAL: 9.59
ECHO PV ACCELERATION TIME (AT): 93 MS
ECHO PV MAX VELOCITY: 1.1 M/S
ECHO PV PEAK GRADIENT: 5 MMHG
ECHO RA AREA 4C: 18.5 CM2
ECHO RA END SYSTOLIC VOLUME APICAL 4 CHAMBER INDEX BSA: 23 ML/M2
ECHO RA VOLUME: 52 ML
ECHO RIGHT VENTRICULAR SYSTOLIC PRESSURE (RVSP): 31 MMHG
ECHO TV REGURGITANT MAX VELOCITY: 2.65 M/S
ECHO TV REGURGITANT PEAK GRADIENT: 28 MMHG

## 2025-01-17 PROCEDURE — 94726 PLETHYSMOGRAPHY LUNG VOLUMES: CPT

## 2025-01-17 PROCEDURE — 93306 TTE W/DOPPLER COMPLETE: CPT | Performed by: INTERNAL MEDICINE

## 2025-01-17 PROCEDURE — 93306 TTE W/DOPPLER COMPLETE: CPT

## 2025-01-17 PROCEDURE — 6360000002 HC RX W HCPCS: Performed by: INTERNAL MEDICINE

## 2025-01-17 PROCEDURE — 94729 DIFFUSING CAPACITY: CPT

## 2025-01-17 PROCEDURE — 94760 N-INVAS EAR/PLS OXIMETRY 1: CPT

## 2025-01-17 PROCEDURE — 94664 DEMO&/EVAL PT USE INHALER: CPT

## 2025-01-17 PROCEDURE — 76376 3D RENDER W/INTRP POSTPROCES: CPT | Performed by: INTERNAL MEDICINE

## 2025-01-17 PROCEDURE — 94060 EVALUATION OF WHEEZING: CPT

## 2025-01-17 RX ORDER — ALBUTEROL SULFATE 0.83 MG/ML
2.5 SOLUTION RESPIRATORY (INHALATION) ONCE
Status: COMPLETED | OUTPATIENT
Start: 2025-01-17 | End: 2025-01-17

## 2025-01-17 RX ADMIN — ALBUTEROL SULFATE 2.5 MG: 2.5 SOLUTION RESPIRATORY (INHALATION) at 11:17

## 2025-02-21 ENCOUNTER — OFFICE VISIT (OUTPATIENT)
Dept: PULMONOLOGY | Age: 70
End: 2025-02-21

## 2025-02-21 VITALS
BODY MASS INDEX: 53.92 KG/M2 | OXYGEN SATURATION: 95 % | SYSTOLIC BLOOD PRESSURE: 138 MMHG | WEIGHT: 293 LBS | HEIGHT: 62 IN | DIASTOLIC BLOOD PRESSURE: 70 MMHG | HEART RATE: 94 BPM

## 2025-02-21 DIAGNOSIS — R60.0 BILATERAL LEG EDEMA: ICD-10-CM

## 2025-02-21 DIAGNOSIS — I27.20 PULMONARY HYPERTENSION (HCC): Primary | ICD-10-CM

## 2025-02-21 DIAGNOSIS — R06.02 SHORTNESS OF BREATH: ICD-10-CM

## 2025-02-21 DIAGNOSIS — E66.01 OBESITY, MORBID, BMI 50 OR HIGHER: ICD-10-CM

## 2025-02-21 LAB
ANION GAP SERPL CALCULATED.3IONS-SCNC: 11 MMOL/L (ref 3–16)
BUN SERPL-MCNC: 18 MG/DL (ref 7–20)
CALCIUM SERPL-MCNC: 9.4 MG/DL (ref 8.3–10.6)
CHLORIDE SERPL-SCNC: 106 MMOL/L (ref 99–110)
CO2 SERPL-SCNC: 26 MMOL/L (ref 21–32)
CREAT SERPL-MCNC: 0.9 MG/DL (ref 0.6–1.2)
GFR SERPLBLD CREATININE-BSD FMLA CKD-EPI: 68 ML/MIN/{1.73_M2}
GLUCOSE SERPL-MCNC: 109 MG/DL (ref 70–99)
POTASSIUM SERPL-SCNC: 3.9 MMOL/L (ref 3.5–5.1)
SODIUM SERPL-SCNC: 143 MMOL/L (ref 136–145)

## 2025-02-21 NOTE — PROGRESS NOTES
[facility]/[place]   PULMONARY AND CRITICAL CARE MEDICINE    OUTPATIENT NOTE     SUBJECTIVE:   Referring Physician: Danni WATKINS    CHIEF COMPLAINT / HPI:    Andreia is a 70 y.o. female that initially presented to clinic for evaluation of SOB.   Has been SOB \"for a minute\". She states she has been dealing with this around 2020, this is around the first time she noticed it. ET limited to maybe 50 ft, she is able to clear 6 steps going up stairs before stopping.   Reported has gained about 30 lb since March.   Last Weight Metrics:      2/21/2025    11:04 AM 1/17/2025     9:53 AM 12/20/2024    10:13 AM 12/10/2024    11:29 AM 11/1/2024    12:40 PM 7/19/2024    11:18 AM 3/29/2024    11:15 AM   Weight Loss Metrics   Height 5' 2.008\" 5' 2\" 5' 2\" 5' 2\"      Weight - Scale 296 lbs 291 lbs 291 lbs 260 lbs 288 lbs 13 oz 286 lbs 3 oz 280 lbs 11 oz   BMI (Calculated) 54.2 kg/m2 53.3 kg/m2 53.3 kg/m2 47.7 kg/m2 0 kg/m2 0 kg/m2 0 kg/m2   She has TERRANCE and is on CPAP, she is compliant with it.   She underwent sleeve gastrectomy around 2020 as well, lost 80 lb but gained them back during COVID.   Relevant Social History  Tobacco: Former smoker, smoked for less than 1 year though. No second hand exposure reported.   Substances: None   Occupational: Used to [employer] and the [employer], last work was at [email or web address], mainly  jobs.   Pets: Dog x2  Family history of pulmonary problems: None that she is aware of.     INTERVAL HISTORY:  02/21 - Echo showed no PH. Saw sleep medicine in December. She is on diuretics and reported good UOP, states her RLE is getting more swollen.     Past Medical History:    Past Medical History:   Diagnosis Date    Anxiety     Arthritis     Back pain     Body mass index (BMI) 45.0-49.9, adult 04/13/2023    Fatty liver     Hx of blood clots 2021    Hypertension     Obesity     Sleep apnea     wears CPAP       Social History:    Social History     Tobacco Use   Smoking Status Never   Smokeless Tobacco

## 2025-02-21 NOTE — ASSESSMENT & PLAN NOTE
Reported worsening edema mainly on RLE.   -Increased Lasix 20 mg to BID for 7 days  -Ordered BMP today and after 7 day bump in diuretics  -Duplex US ordered

## 2025-02-21 NOTE — ASSESSMENT & PLAN NOTE
Findings were just refilled for hypertension were noted on CTPA, patient reported history of heart failure at some point and is known to have TERRANCE on CPAP, question whether this is WHO 2-3.  Most recent echo showed RVSP 31 mmHg, normal RV fx.   -Continue non-invasive monitoring for now.  -Encouraged to continue compliance with CPAP and weight loss strategies.

## 2025-02-21 NOTE — ASSESSMENT & PLAN NOTE
Suspect multifactorial and obesity with deconditioning believed to be the main culprit.  -Encouraged to increase physical activity

## 2025-03-07 ENCOUNTER — HOSPITAL ENCOUNTER (OUTPATIENT)
Dept: VASCULAR LAB | Age: 70
Discharge: HOME | End: 2025-03-09
Attending: INTERNAL MEDICINE
Payer: COMMERCIAL

## 2025-03-07 DIAGNOSIS — R60.0 BILATERAL LEG EDEMA: ICD-10-CM

## 2025-03-07 PROCEDURE — 93970 EXTREMITY STUDY: CPT

## 2025-03-10 PROCEDURE — 93970 EXTREMITY STUDY: CPT | Performed by: SURGERY

## 2025-03-30 DIAGNOSIS — J30.89 OTHER ALLERGIC RHINITIS: ICD-10-CM

## 2025-03-31 RX ORDER — LORATADINE 10 MG/1
10 TABLET ORAL DAILY PRN
Qty: 90 TABLET | Refills: 0 | Status: SHIPPED | OUTPATIENT
Start: 2025-03-31

## 2025-03-31 NOTE — TELEPHONE ENCOUNTER
Medication:   Requested Prescriptions     Pending Prescriptions Disp Refills    loratadine (CLARITIN) 10 MG tablet [Pharmacy Med Name: ALLERGY RELF (LORATADINE) 10MG TABS] 90 tablet 0     Sig: TAKE 1 TABLET BY MOUTH DAILY AS NEEDED        Last Filled:      Patient Phone Number: [phone] (home)     Last appt: 11/1/2024   Next appt: Visit date not found    Last OARRS:        No data to display

## 2025-04-02 DIAGNOSIS — I10 PRIMARY HYPERTENSION: ICD-10-CM

## 2025-04-02 RX ORDER — LOSARTAN POTASSIUM AND HYDROCHLOROTHIAZIDE 12.5; 5 MG/1; MG/1
1 TABLET ORAL DAILY
Qty: 60 TABLET | Refills: 0 | Status: SHIPPED | OUTPATIENT
Start: 2025-04-02

## 2025-05-02 ENCOUNTER — OFFICE VISIT (OUTPATIENT)
Dept: PULMONOLOGY | Age: 70
End: 2025-05-02

## 2025-05-02 VITALS
HEART RATE: 77 BPM | WEIGHT: 288 LBS | OXYGEN SATURATION: 95 % | SYSTOLIC BLOOD PRESSURE: 130 MMHG | DIASTOLIC BLOOD PRESSURE: 62 MMHG | HEIGHT: 62 IN | BODY MASS INDEX: 53 KG/M2 | RESPIRATION RATE: 18 BRPM

## 2025-05-02 DIAGNOSIS — R60.0 BILATERAL LEG EDEMA: ICD-10-CM

## 2025-05-02 DIAGNOSIS — R06.02 SHORTNESS OF BREATH: ICD-10-CM

## 2025-05-02 DIAGNOSIS — J31.0 CHRONIC RHINITIS: ICD-10-CM

## 2025-05-02 DIAGNOSIS — I27.20 PULMONARY HYPERTENSION (HCC): Primary | ICD-10-CM

## 2025-05-02 DIAGNOSIS — E66.01 OBESITY, MORBID, BMI 50 OR HIGHER (HCC): ICD-10-CM

## 2025-05-02 RX ORDER — FLUTICASONE PROPIONATE 50 MCG
1 SPRAY, SUSPENSION (ML) NASAL 2 TIMES DAILY
Qty: 16 G | Refills: 5 | Status: SHIPPED | OUTPATIENT
Start: 2025-05-02

## 2025-05-02 RX ORDER — AZELASTINE 1 MG/ML
1 SPRAY, METERED NASAL 2 TIMES DAILY
Qty: 60 ML | Refills: 5 | Status: SHIPPED | OUTPATIENT
Start: 2025-05-02

## 2025-05-02 NOTE — PROGRESS NOTES
[facility]/[place]   PULMONARY AND CRITICAL CARE MEDICINE    OUTPATIENT NOTE     SUBJECTIVE:   Referring Physician: Danni WATKINS    CHIEF COMPLAINT / HPI:    Andreia is a 70 y.o. female that initially presented to clinic for evaluation of SOB.   Has been SOB \"for a minute\". She states she has been dealing with this around 2020, this is around the first time she noticed it. ET limited to maybe 50 ft, she is able to clear 6 steps going up stairs before stopping.   Reported has gained about 30 lb since March.   Last Weight Metrics:      5/2/2025    10:43 AM 2/21/2025    11:04 AM 1/17/2025     9:53 AM 12/20/2024    10:13 AM 12/10/2024    11:29 AM 11/1/2024    12:40 PM 7/19/2024    11:18 AM   Weight Loss Metrics   Height 5' 2\" 5' 2.008\" 5' 2\" 5' 2\" 5' 2\"     Weight - Scale 288 lbs 296 lbs 291 lbs 291 lbs 260 lbs 288 lbs 13 oz 286 lbs 3 oz   BMI (Calculated) 52.8 kg/m2 54.2 kg/m2 53.3 kg/m2 53.3 kg/m2 47.7 kg/m2 0 kg/m2 0 kg/m2   She has TERRANCE and is on CPAP, she is compliant with it.   She underwent sleeve gastrectomy around 2020 as well, lost 80 lb but gained them back during COVID.   Relevant Social History  Tobacco: Former smoker, smoked for less than 1 year though. No second hand exposure reported.   Substances: None   Occupational: Used to [employer] and the [employer], last work was at [employer], mainly  jobs.   Pets: Dog x2  Family history of pulmonary problems: None that she is aware of.     INTERVAL HISTORY:  02/21 - Echo showed no PH. Saw sleep medicine in December. She is on diuretics and reported good UOP, states her RLE is getting more swollen.   05/02 - Weight stable, no DVT on LE US.     Past Medical History:    Past Medical History:   Diagnosis Date    Anxiety     Arthritis     Back pain     Body mass index (BMI) 45.0-49.9, adult (HCC) 04/13/2023    Fatty liver     Hx of blood clots 2021    Hypertension     Obesity     Sleep apnea     wears CPAP       Social History:    Social History     Tobacco

## 2025-05-02 NOTE — ASSESSMENT & PLAN NOTE
Findings were just refilled for hypertension were noted on CTPA, patient reported history of heart failure at some point and is known to have TERRANCE on CPAP, question whether this is WHO 2-3.  Most recent echo showed RVSP 31 mmHg, normal RV fx.   Compliant with CPA use now, following with Sleep.   -Continue non-invasive monitoring for now.  -Encouraged to continue compliance with CPAP and weight loss strategies.

## 2025-06-03 ENCOUNTER — RESULTS FOLLOW-UP (OUTPATIENT)
Dept: INTERNAL MEDICINE CLINIC | Age: 70
End: 2025-06-03

## 2025-06-03 ENCOUNTER — HOSPITAL ENCOUNTER (OUTPATIENT)
Dept: MAMMOGRAPHY | Age: 70
Discharge: HOME | End: 2025-06-03
Payer: COMMERCIAL

## 2025-06-03 VITALS — BODY MASS INDEX: 53 KG/M2 | HEIGHT: 62 IN | WEIGHT: 288 LBS

## 2025-06-03 DIAGNOSIS — Z12.31 VISIT FOR SCREENING MAMMOGRAM: ICD-10-CM

## 2025-06-03 PROCEDURE — 77063 BREAST TOMOSYNTHESIS BI: CPT

## 2025-06-06 SDOH — ECONOMIC STABILITY: INCOME INSECURITY: IN THE LAST 12 MONTHS, WAS THERE A TIME WHEN YOU WERE NOT ABLE TO PAY THE MORTGAGE OR RENT ON TIME?: NO

## 2025-06-06 SDOH — ECONOMIC STABILITY: FOOD INSECURITY: WITHIN THE PAST 12 MONTHS, THE FOOD YOU BOUGHT JUST DIDN'T LAST AND YOU DIDN'T HAVE MONEY TO GET MORE.: NEVER TRUE

## 2025-06-06 SDOH — ECONOMIC STABILITY: FOOD INSECURITY: WITHIN THE PAST 12 MONTHS, YOU WORRIED THAT YOUR FOOD WOULD RUN OUT BEFORE YOU GOT MONEY TO BUY MORE.: NEVER TRUE

## 2025-06-06 SDOH — ECONOMIC STABILITY: TRANSPORTATION INSECURITY
IN THE PAST 12 MONTHS, HAS THE LACK OF TRANSPORTATION KEPT YOU FROM MEDICAL APPOINTMENTS OR FROM GETTING MEDICATIONS?: NO

## 2025-06-06 ASSESSMENT — PATIENT HEALTH QUESTIONNAIRE - PHQ9
SUM OF ALL RESPONSES TO PHQ QUESTIONS 1-9: 1
2. FEELING DOWN, DEPRESSED OR HOPELESS: SEVERAL DAYS
1. LITTLE INTEREST OR PLEASURE IN DOING THINGS: NOT AT ALL

## 2025-06-06 ASSESSMENT — LIFESTYLE VARIABLES
HOW OFTEN DO YOU HAVE A DRINK CONTAINING ALCOHOL: 1
HOW MANY STANDARD DRINKS CONTAINING ALCOHOL DO YOU HAVE ON A TYPICAL DAY: PATIENT DOES NOT DRINK
HOW OFTEN DO YOU HAVE SIX OR MORE DRINKS ON ONE OCCASION: 1
HOW OFTEN DO YOU HAVE A DRINK CONTAINING ALCOHOL: NEVER
HOW MANY STANDARD DRINKS CONTAINING ALCOHOL DO YOU HAVE ON A TYPICAL DAY: 0

## 2025-06-09 ENCOUNTER — OFFICE VISIT (OUTPATIENT)
Dept: INTERNAL MEDICINE CLINIC | Age: 70
End: 2025-06-09

## 2025-06-09 VITALS
BODY MASS INDEX: 53.92 KG/M2 | WEIGHT: 293 LBS | DIASTOLIC BLOOD PRESSURE: 64 MMHG | OXYGEN SATURATION: 97 % | TEMPERATURE: 97.5 F | SYSTOLIC BLOOD PRESSURE: 124 MMHG | HEIGHT: 62 IN | HEART RATE: 84 BPM

## 2025-06-09 DIAGNOSIS — G47.33 OBSTRUCTIVE SLEEP APNEA (ADULT) (PEDIATRIC): ICD-10-CM

## 2025-06-09 DIAGNOSIS — E66.01 OBESITY, MORBID, BMI 50 OR HIGHER (HCC): ICD-10-CM

## 2025-06-09 DIAGNOSIS — Z13.1 SCREENING FOR DIABETES MELLITUS: ICD-10-CM

## 2025-06-09 DIAGNOSIS — R09.02 HYPOXIA: ICD-10-CM

## 2025-06-09 DIAGNOSIS — I10 PRIMARY HYPERTENSION: ICD-10-CM

## 2025-06-09 DIAGNOSIS — R06.02 SHORTNESS OF BREATH: ICD-10-CM

## 2025-06-09 DIAGNOSIS — Z00.00 MEDICARE ANNUAL WELLNESS VISIT, SUBSEQUENT: Primary | ICD-10-CM

## 2025-06-09 PROBLEM — R07.9 CHEST PAIN: Status: RESOLVED | Noted: 2021-09-30 | Resolved: 2025-06-09

## 2025-06-09 RX ORDER — LOSARTAN POTASSIUM AND HYDROCHLOROTHIAZIDE 12.5; 5 MG/1; MG/1
1 TABLET ORAL DAILY
Qty: 60 TABLET | Refills: 0 | Status: SHIPPED | OUTPATIENT
Start: 2025-06-09

## 2025-06-09 ASSESSMENT — PATIENT HEALTH QUESTIONNAIRE - PHQ9
7. TROUBLE CONCENTRATING ON THINGS, SUCH AS READING THE NEWSPAPER OR WATCHING TELEVISION: NOT AT ALL
6. FEELING BAD ABOUT YOURSELF - OR THAT YOU ARE A FAILURE OR HAVE LET YOURSELF OR YOUR FAMILY DOWN: NOT AT ALL
SUM OF ALL RESPONSES TO PHQ QUESTIONS 1-9: 0
4. FEELING TIRED OR HAVING LITTLE ENERGY: NOT AT ALL
3. TROUBLE FALLING OR STAYING ASLEEP: NOT AT ALL
10. IF YOU CHECKED OFF ANY PROBLEMS, HOW DIFFICULT HAVE THESE PROBLEMS MADE IT FOR YOU TO DO YOUR WORK, TAKE CARE OF THINGS AT HOME, OR GET ALONG WITH OTHER PEOPLE: NOT DIFFICULT AT ALL
5. POOR APPETITE OR OVEREATING: NOT AT ALL
1. LITTLE INTEREST OR PLEASURE IN DOING THINGS: NOT AT ALL
2. FEELING DOWN, DEPRESSED OR HOPELESS: NOT AT ALL
SUM OF ALL RESPONSES TO PHQ QUESTIONS 1-9: 0
8. MOVING OR SPEAKING SO SLOWLY THAT OTHER PEOPLE COULD HAVE NOTICED. OR THE OPPOSITE, BEING SO FIGETY OR RESTLESS THAT YOU HAVE BEEN MOVING AROUND A LOT MORE THAN USUAL: NOT AT ALL
9. THOUGHTS THAT YOU WOULD BE BETTER OFF DEAD, OR OF HURTING YOURSELF: NOT AT ALL

## 2025-06-09 NOTE — PATIENT INSTRUCTIONS
Please get your eye exam completed  Increase your physical activity daily    Fit bit (water resistant) to calculate your steps 1000 steps, then 2000 steps, then 5000 steps- Effort and consistency!!    at [facility] in [place] on [address] at 9am, TuesTh at 6pm (Ms HEMAL Redmond)     -use insurance to get workout and transportation covered  [employer] [facility] to scan groceries , determine if healthy or high additives, and choose healthier food alternatives given    Please check in with your  for dentist ASAP    Consider RSV vaccine    Labs can be done before next visit    Blue Zones are placed studied and shown to be the healthiest in the world. The tips above show some of their dietary practices. In general, they increase fiber to target 20-25g/day & decrease sugar, salt, and processed foods and meats.  In addition to diet, targeting 30 minutes of walking, jogging, biking, or swimming a day 5 days of the week and doing resistance training (weight lifting) 2x per week helps you stay healthy.   And of course, limiting alcohol intake and not smoking are critical to maintaining a healthy life.     Daily [employer] [place] - may be helpful in keeping track of these         Learning About Dental Care for Older Adults  Dental care for older adults: Overview  Dental care for older people is much the same as for younger adults. But older adults do have concerns that younger adults do not. Older adults may have problems with gum disease and decay on the roots of their teeth. They may need missing teeth replaced or broken fillings fixed. Or they may have dentures that need to be cared for. Some older adults may have trouble holding a toothbrush.  You can help remind the person you are caring for to brush and floss their teeth or to clean their dentures. In some cases, you may need to do the brushing and other dental care tasks. People who have trouble using their hands or who have dementia may need this

## 2025-06-09 NOTE — PROGRESS NOTES
daily Use in each nostril as directed Yes Vladimir Cespedes MD   fluticasone (FLONASE) 50 MCG/ACT nasal spray 1 spray by Each Nostril route in the morning and at bedtime Yes Vladimir Cespedes MD   loratadine (CLARITIN) 10 MG tablet TAKE 1 TABLET BY MOUTH DAILY AS NEEDED Yes Yael Razo MD   furosemide (LASIX) 20 MG tablet Take 1 tablet by mouth daily as needed (SWELLING) Yes Lilibeth Gusman MD   vitamin D (VITAMIN D3) 25 MCG (1000 UT) CAPS Take 1 capsule by mouth daily Yes Lilibeth Gusman MD   Compression Stockings MISC by Does not apply route 3 pair. Put on in am and off at bed time. 20-30 mm Yes Rao Gerber MD       CareTeam (Including outside providers/suppliers regularly involved in providing care):   Patient Care Team:  Yael Razo MD as PCP - General (Family Medicine)  Yael Razo MD as PCP - Empaneled Provider  Piotr Ya MD as Consulting Physician (Sleep Medicine Family [facility])  Kehrt, Tiffanie R, APRN - CNP as Nurse Practitioner (Nurse Practitioner)  Romulo Johnson MD as Consulting Physician (Hematology and Oncology)     Recommendations for Preventive Services Due: see orders and patient instructions/AVS.  Recommended screening schedule for the next 5-10 years is provided to the patient in written form: see Patient Instructions/AVS.     Reviewed and updated this visit:  Tobacco  Allergies  Meds  Problems

## 2025-06-29 DIAGNOSIS — J30.89 OTHER ALLERGIC RHINITIS: ICD-10-CM

## 2025-06-30 RX ORDER — LORATADINE 10 MG/1
10 TABLET ORAL DAILY PRN
Qty: 90 TABLET | Refills: 0 | Status: SHIPPED | OUTPATIENT
Start: 2025-06-30

## 2025-06-30 NOTE — TELEPHONE ENCOUNTER
Medication:   Requested Prescriptions     Pending Prescriptions Disp Refills    loratadine (CLARITIN) 10 MG tablet [Pharmacy Med Name: ALLERGY RELF (LORATADINE) 10MG TABS] 90 tablet 0     Sig: TAKE 1 TABLET BY MOUTH DAILY AS NEEDED     Last Filled:  3/31/2025    Last appt: 6/9/2025   Next appt: 7/11/2025    Last OARRS:        No data to display

## 2025-07-11 ENCOUNTER — OFFICE VISIT (OUTPATIENT)
Dept: INTERNAL MEDICINE CLINIC | Age: 70
End: 2025-07-11

## 2025-07-11 VITALS
WEIGHT: 293 LBS | OXYGEN SATURATION: 95 % | SYSTOLIC BLOOD PRESSURE: 130 MMHG | DIASTOLIC BLOOD PRESSURE: 70 MMHG | BODY MASS INDEX: 53.96 KG/M2 | HEART RATE: 88 BPM

## 2025-07-11 DIAGNOSIS — I10 PRIMARY HYPERTENSION: ICD-10-CM

## 2025-07-11 DIAGNOSIS — Z13.1 SCREENING FOR DIABETES MELLITUS: ICD-10-CM

## 2025-07-11 DIAGNOSIS — R60.0 BILATERAL LEG EDEMA: ICD-10-CM

## 2025-07-11 DIAGNOSIS — E66.01 OBESITY, MORBID, BMI 50 OR HIGHER (HCC): ICD-10-CM

## 2025-07-11 DIAGNOSIS — E66.01 OBESITY, MORBID, BMI 50 OR HIGHER (HCC): Primary | ICD-10-CM

## 2025-07-11 DIAGNOSIS — R06.02 SHORTNESS OF BREATH: ICD-10-CM

## 2025-07-11 RX ORDER — LOSARTAN POTASSIUM AND HYDROCHLOROTHIAZIDE 12.5; 5 MG/1; MG/1
1 TABLET ORAL DAILY
Qty: 90 TABLET | Refills: 0 | Status: SHIPPED | OUTPATIENT
Start: 2025-07-11

## 2025-07-11 SDOH — ECONOMIC STABILITY: FOOD INSECURITY: WITHIN THE PAST 12 MONTHS, THE FOOD YOU BOUGHT JUST DIDN'T LAST AND YOU DIDN'T HAVE MONEY TO GET MORE.: NEVER TRUE

## 2025-07-11 SDOH — ECONOMIC STABILITY: FOOD INSECURITY: WITHIN THE PAST 12 MONTHS, YOU WORRIED THAT YOUR FOOD WOULD RUN OUT BEFORE YOU GOT MONEY TO BUY MORE.: NEVER TRUE

## 2025-07-11 ASSESSMENT — ENCOUNTER SYMPTOMS
APNEA: 0
SHORTNESS OF BREATH: 1

## 2025-07-11 NOTE — PROGRESS NOTES
[facility]  2025    Andreia Colon (:  1955) is a 70 y.o. female, here for evaluation of the following medical concerns:    Chief Complaint   Patient presents with    1 Month Follow-Up     sob          HPI    Blood drawn this morning  Working on wt loss, slow but active. Walking twice daily and using resistance bands for arms and lower body   Diet      Working on no more chips or salt-free/ low salt intake  Salads and grilled chicken with cheese   Coking foods  Eating healthier even when eating out    Sob not worse, just so-so   Seeing pulm again in November  Still using cpap machine every night - notices difference in improvement   Staying asleep, no apnea, more energy in the morning         Taking hyzaar daily   120s-130s daily   O2 range never below 95 (95-98%  RA)    Cataract right eye  ROS  Review of Systems   Respiratory:  Positive for shortness of breath (so-so  not worse. largely with walking). Negative for apnea.    Cardiovascular:  Positive for leg swelling (maybe per patient). Negative for chest pain and palpitations.   Neurological:  Negative for dizziness and light-headedness.   Psychiatric/Behavioral:  Negative for sleep disturbance.        HISTORIES  Current Outpatient Medications on File Prior to Visit   Medication Sig Dispense Refill    loratadine (CLARITIN) 10 MG tablet TAKE 1 TABLET BY MOUTH DAILY AS NEEDED 90 tablet 0    azelastine (ASTELIN) 0.1 % nasal spray 1 spray by Nasal route 2 times daily Use in each nostril as directed 60 mL 5    fluticasone (FLONASE) 50 MCG/ACT nasal spray 1 spray by Each Nostril route in the morning and at bedtime 16 g 5    furosemide (LASIX) 20 MG tablet Take 1 tablet by mouth daily as needed (SWELLING) 30 tablet 1    vitamin D (VITAMIN D3) 25 MCG (1000 UT) CAPS Take 1 capsule by mouth daily 90 capsule 0    Compression Stockings MISC by Does not apply route 3 pair. Put on in am and off at bed time. 20-30 mm 3 each 0     No current

## 2025-07-11 NOTE — PATIENT INSTRUCTIONS
Continue walking and exercising  Increase activity by increasing endurance  Go see a dentist and eye specialist      Kimmy gonzalez to scan groceries , determine if healthy or high additives, and choose healthier food alternatives given  Continue nightly cpap  RSV vaccine for nearby pharmacy ([facility])

## 2025-07-12 LAB
ALBUMIN SERPL-MCNC: 3.8 G/DL (ref 3.4–5)
ALBUMIN/GLOB SERPL: 1.3 {RATIO} (ref 1.1–2.2)
ALP SERPL-CCNC: 61 U/L (ref 40–129)
ALT SERPL-CCNC: 28 U/L (ref 10–40)
ANION GAP SERPL CALCULATED.3IONS-SCNC: 12 MMOL/L (ref 3–16)
AST SERPL-CCNC: 39 U/L (ref 15–37)
BILIRUB SERPL-MCNC: 0.8 MG/DL (ref 0–1)
BUN SERPL-MCNC: 20 MG/DL (ref 7–20)
CALCIUM SERPL-MCNC: 9.3 MG/DL (ref 8.3–10.6)
CHLORIDE SERPL-SCNC: 104 MMOL/L (ref 99–110)
CHOLEST SERPL-MCNC: 147 MG/DL (ref 0–199)
CO2 SERPL-SCNC: 24 MMOL/L (ref 21–32)
CREAT SERPL-MCNC: 1 MG/DL (ref 0.6–1.2)
EST. AVERAGE GLUCOSE BLD GHB EST-MCNC: 102.5 MG/DL
GFR SERPLBLD CREATININE-BSD FMLA CKD-EPI: 60 ML/MIN/{1.73_M2}
GLUCOSE P FAST SERPL-MCNC: 93 MG/DL (ref 70–99)
HBA1C MFR BLD: 5.2 %
HDLC SERPL-MCNC: 44 MG/DL (ref 40–60)
LDLC SERPL CALC-MCNC: 87 MG/DL
POTASSIUM SERPL-SCNC: 4.2 MMOL/L (ref 3.5–5.1)
PROT SERPL-MCNC: 6.8 G/DL (ref 6.4–8.2)
SODIUM SERPL-SCNC: 140 MMOL/L (ref 136–145)
TRIGL SERPL-MCNC: 80 MG/DL (ref 0–150)
VLDLC SERPL CALC-MCNC: 16 MG/DL

## 2025-07-25 ENCOUNTER — TELEPHONE (OUTPATIENT)
Dept: INTERNAL MEDICINE CLINIC | Age: 70
End: 2025-07-25

## 2025-07-25 NOTE — TELEPHONE ENCOUNTER
Jennifer from [facility] called wanting to know pts A1c Bmi and any testing she had done during her visit on 6/9. Please call Jennifer back at  [phone]

## 2025-08-01 DIAGNOSIS — I10 PRIMARY HYPERTENSION: ICD-10-CM

## 2025-08-01 DIAGNOSIS — J30.89 OTHER ALLERGIC RHINITIS: ICD-10-CM

## 2025-08-01 RX ORDER — LORATADINE 10 MG/1
10 TABLET ORAL DAILY PRN
Qty: 90 TABLET | Refills: 0 | Status: SHIPPED | OUTPATIENT
Start: 2025-08-01

## 2025-08-01 RX ORDER — LOSARTAN POTASSIUM AND HYDROCHLOROTHIAZIDE 12.5; 5 MG/1; MG/1
1 TABLET ORAL DAILY
Qty: 90 TABLET | Refills: 0 | Status: SHIPPED | OUTPATIENT
Start: 2025-08-01

## 2025-08-01 NOTE — TELEPHONE ENCOUNTER
Medication:   Requested Prescriptions     Pending Prescriptions Disp Refills    losartan-hydroCHLOROthiazide (HYZAAR) 50-12.5 MG per tablet [Pharmacy Med Name: LOSARTAN/HCTZ 50/12.5MG TABLETS] 90 tablet 0     Sig: TAKE 1 TABLET BY MOUTH DAILY    loratadine (CLARITIN) 10 MG tablet [Pharmacy Med Name: ALLERGY RELF (LORATADINE) 10MG TABS] 90 tablet 0     Sig: TAKE 1 TABLET BY MOUTH DAILY AS NEEDED        Last Filled:      Patient Phone Number: [phone] (home)     Last appt: 7/11/2025   Next appt: 11/13/2025    Last OARRS:        No data to display

## (undated) DEVICE — TROCAR: Brand: KII FIOS FIRST ENTRY

## (undated) DEVICE — RELOAD STPL H4.1X2MM DIA60MM THCK TISS GRN 6 ROW PWR GST B

## (undated) DEVICE — PENCIL ES L3M ROCK SWCH S STL HEX LOK BLDE ELECTRD HOLSTER

## (undated) DEVICE — BINDER ABD H12IN FOR 62-74IN WAIST UNIV 4 PNL PREM DSGN E

## (undated) DEVICE — SUTURE VCRL SZ 4-0 L18IN ABSRB UD L19MM PS-2 3/8 CIR PRIM J496H

## (undated) DEVICE — TOWEL,OR,DSP,ST,BLUE,DLX,8/PK,10PK/CS: Brand: MEDLINE

## (undated) DEVICE — CHLORAPREP 26ML ORANGE

## (undated) DEVICE — INTENDED FOR TISSUE SEPARATION, AND OTHER PROCEDURES THAT REQUIRE A SHARP SURGICAL BLADE TO PUNCTURE OR CUT.: Brand: BARD-PARKER ® CARBON RIB-BACK BLADES

## (undated) DEVICE — KIT DRP 3 ARM ACC DISP ENDOWRIST DA VINCI SI

## (undated) DEVICE — SUTURE ETHBND EXCEL SZ 0 L30IN NONABSORBABLE GRN L26MM SH X834H

## (undated) DEVICE — 40583 XL ADVANCED TRENDELENBURG POSITIONING KIT: Brand: 40583 XL ADVANCED TRENDELENBURG POSITIONING KIT

## (undated) DEVICE — Device

## (undated) DEVICE — TRAY CATHETER 16FR F INCLUDE BARDX IC COMPLT CARE DRNGE BG

## (undated) DEVICE — PRE OP PACK: Brand: MEDLINE INDUSTRIES, INC.

## (undated) DEVICE — SOLUTION ANTIFOG VIS SYS CLEARIFY LAPSCP

## (undated) DEVICE — CANNULA SAMP CO2 AD GRN 7FT CO2 AND 7FT O2 TBNG UNIV CONN

## (undated) DEVICE — STANDARD HYPODERMIC NEEDLE,POLYPROPYLENE HUB: Brand: MONOJECT

## (undated) DEVICE — GOWN,SIRUS,POLYRNF,BRTHSLV,LG,30/CS: Brand: MEDLINE

## (undated) DEVICE — GOWN,SIRUS,POLYRNF,BRTHSLV,XL,30/CS: Brand: MEDLINE

## (undated) DEVICE — LARGE NEEDLE DRIVER: Brand: ENDOWRIST;DAVINCI SI

## (undated) DEVICE — FORCEPS BX L240CM DIA2.4MM L NDL RAD JAW 4 133340

## (undated) DEVICE — RELOAD STPL H1.8-3.8MM REG THCK TISS G 6 ROW GRIPPING SURF

## (undated) DEVICE — BOWL MED L 32OZ PLAS W/ MOLD GRAD EZ OPN PEEL PCH

## (undated) DEVICE — GLOVE ORANGE PI 7   MSG9070

## (undated) DEVICE — NEEDLE INSUF L150MM DIA2MM DISP FOR PNEUMOPERI ENDOPATH

## (undated) DEVICE — DRAPE,LAP,CHOLE,W/TROUGHS,STERILE: Brand: MEDLINE

## (undated) DEVICE — RELOAD STPL L60MM H1.5-3.6MM REG TISS BLU GRIPPING SURF B

## (undated) DEVICE — [HIGH FLOW INSUFFLATOR,  DO NOT USE IF PACKAGE IS DAMAGED,  KEEP DRY,  KEEP AWAY FROM SUNLIGHT,  PROTECT FROM HEAT AND RADIOACTIVE SOURCES.]: Brand: PNEUMOSURE

## (undated) DEVICE — SYRINGE MED 10ML POLYPR LUERSLIP TIP FLAT TOP W/O SFTY DISP

## (undated) DEVICE — CATHETER URETH 12FR BLLN 5CC LTX HYDRGEL 2 W BARDX LUB F

## (undated) DEVICE — TROCAR: Brand: KII OPTICAL ACCESS SYSTEM

## (undated) DEVICE — ELECTRODE PT RET AD L9FT HI MOIST COND ADH HYDRGEL CORDED

## (undated) DEVICE — VESSEL SEALER: Brand: ENDOWRIST;DAVINCI SI

## (undated) DEVICE — SPONGE,LAP,18"X18",DLX,XR,ST,5/PK,40/PK: Brand: MEDLINE

## (undated) DEVICE — SOLUTION INJ LR VISIV 1000ML BG

## (undated) DEVICE — OBTURATOR ROBOTIC DIA8MM LNG BLDELSS ENDOSCP DISP DA VINCI

## (undated) DEVICE — SUTURE VCRL SZ 2-0 L27IN ABSRB UD L26MM SH 1/2 CIR J417H

## (undated) DEVICE — CADIERE FORCEPS: Brand: ENDOWRIST;DAVINCI SI

## (undated) DEVICE — ANTI-FOG SOLUTION WITH FOAM PAD: Brand: DEVON

## (undated) DEVICE — GAUZE,SPONGE,4"X4",16PLY,XRAY,STRL,LF: Brand: MEDLINE

## (undated) DEVICE — SKIN AFFIX SURG ADHESIVE 72/CS 0.55ML: Brand: MEDLINE

## (undated) DEVICE — SYRINGE MED 10ML TRNSLUC BRL PLUNG BLK MRK POLYPR CTRL

## (undated) DEVICE — SUTURE VCRL SZ 0 L54IN ABSRB VLT W/O NDL POLYGLACTIN 910 J616H

## (undated) DEVICE — SPONGE,LAP,4"X18",XR,ST,5/PK,40PK/CS: Brand: MEDLINE INDUSTRIES, INC.

## (undated) DEVICE — SURGICAL SET UP - SURE SET: Brand: MEDLINE INDUSTRIES, INC.

## (undated) DEVICE — SYRINGE CATH TIP 50ML

## (undated) DEVICE — SYSTEM SMK EVAC LAP TBNG FILTER HSNG BENT STYL PNK SEE CLR

## (undated) DEVICE — LAPAROSCOPIC SCISSORS: Brand: EPIX LAPAROSCOPIC SCISSORS

## (undated) DEVICE — STAPLER SKIN L440MM 32MM LNG 12 FIRING B FRM PWR + GRIPPING

## (undated) DEVICE — 3M™ WARMING BLANKET, UPPER BODY, 10 PER CASE, 42268: Brand: BAIR HUGGER™

## (undated) DEVICE — GARMENT,MEDLINE,DVT,INT,CALF,LG, GEN2: Brand: MEDLINE

## (undated) DEVICE — SURE SET-DOUBLE BASIN-LF: Brand: MEDLINE INDUSTRIES, INC.

## (undated) DEVICE — GLOVE SURG SZ 75 L12IN FNGR THK87MIL DK GRN LTX FREE ISOLEX

## (undated) DEVICE — TURNOVER KIT RM INF CTRL TECH

## (undated) DEVICE — PUMP SUC IRR TBNG L10FT W/ HNDPC ASSEMB STRYKEFLOW 2

## (undated) DEVICE — VISIGI 3D®  CALIBRATION SYSTEM  SIZE 36FR STD W/ BULB: Brand: BOEHRINGER® VISIGI 3D™ SLEEVE GASTRECTOMY CALIBRATION SYSTEM, SIZE 36FR W/BULB